# Patient Record
Sex: FEMALE | Race: BLACK OR AFRICAN AMERICAN
[De-identification: names, ages, dates, MRNs, and addresses within clinical notes are randomized per-mention and may not be internally consistent; named-entity substitution may affect disease eponyms.]

---

## 2020-03-10 ENCOUNTER — HOSPITAL ENCOUNTER (OUTPATIENT)
Dept: HOSPITAL 76 - LAB.WCP | Age: 80
Discharge: HOME | End: 2020-03-10
Attending: FAMILY MEDICINE
Payer: MEDICARE

## 2020-03-10 DIAGNOSIS — I25.10: ICD-10-CM

## 2020-03-10 DIAGNOSIS — R94.6: ICD-10-CM

## 2020-03-10 DIAGNOSIS — G89.4: ICD-10-CM

## 2020-03-10 DIAGNOSIS — H53.8: Primary | ICD-10-CM

## 2020-03-10 DIAGNOSIS — I73.9: ICD-10-CM

## 2020-03-10 LAB
ALBUMIN DIAFP-MCNC: 3.3 G/DL (ref 3.2–5.5)
ALBUMIN/GLOB SERPL: 0.6 {RATIO} (ref 1–2.2)
ALP SERPL-CCNC: 92 IU/L (ref 42–121)
ALT SERPL W P-5'-P-CCNC: 11 IU/L (ref 10–60)
ANION GAP SERPL CALCULATED.4IONS-SCNC: 5 MMOL/L (ref 6–13)
AST SERPL W P-5'-P-CCNC: 17 IU/L (ref 10–42)
BASOPHILS NFR BLD AUTO: 0 10^3/UL (ref 0–0.1)
BASOPHILS NFR BLD AUTO: 0.3 %
BILIRUB BLD-MCNC: 0.4 MG/DL (ref 0.2–1)
BUN SERPL-MCNC: 14 MG/DL (ref 6–20)
CALCIUM UR-MCNC: 9.2 MG/DL (ref 8.5–10.3)
CHLORIDE SERPL-SCNC: 109 MMOL/L (ref 101–111)
CHOLEST SERPL-MCNC: 138 MG/DL
CO2 SERPL-SCNC: 25 MMOL/L (ref 21–32)
CREAT SERPLBLD-SCNC: 0.8 MG/DL (ref 0.4–1)
EOSINOPHIL # BLD AUTO: 0 10^3/UL (ref 0–0.7)
EOSINOPHIL NFR BLD AUTO: 0.7 %
ERYTHROCYTE [DISTWIDTH] IN BLOOD BY AUTOMATED COUNT: 18.7 % (ref 12–15)
GFRSERPLBLD MDRD-ARVRAT: 83 ML/MIN/{1.73_M2} (ref 89–?)
GLOBULIN SER-MCNC: 5.2 G/DL (ref 2.1–4.2)
GLUCOSE SERPL-MCNC: 60 MG/DL (ref 70–100)
HDLC SERPL-MCNC: 32 MG/DL
HDLC SERPL: 4.3 {RATIO} (ref ?–4.4)
HGB UR QL STRIP: 11.9 G/DL (ref 12–16)
LDLC SERPL CALC-MCNC: 76 MG/DL
LDLC/HDLC SERPL: 2.4 {RATIO} (ref ?–4.4)
LYMPHOCYTES # SPEC AUTO: 3.2 10^3/UL (ref 1.5–3.5)
LYMPHOCYTES NFR BLD AUTO: 54.8 %
MCH RBC QN AUTO: 28.3 PG (ref 27–31)
MCHC RBC AUTO-ENTMCNC: 29.8 G/DL (ref 32–36)
MCV RBC AUTO: 94.8 FL (ref 81–99)
MONOCYTES # BLD AUTO: 0.6 10^3/UL (ref 0–1)
MONOCYTES NFR BLD AUTO: 10.1 %
NEUTROPHILS # BLD AUTO: 2 10^3/UL (ref 1.5–6.6)
NEUTROPHILS # SNV AUTO: 5.8 X10^3/UL (ref 4.8–10.8)
NEUTROPHILS NFR BLD AUTO: 33.9 %
PDW BLD AUTO: 12.6 FL (ref 7.9–10.8)
PLATELET # BLD: 475 10^3/UL (ref 130–450)
PROT SPEC-MCNC: 8.5 G/DL (ref 6.7–8.2)
RBC MAR: 4.21 10^6/UL (ref 4.2–5.4)
SODIUM SERPLBLD-SCNC: 139 MMOL/L (ref 135–145)
VLDLC SERPL-SCNC: 30 MG/DL

## 2020-03-10 PROCEDURE — 84443 ASSAY THYROID STIM HORMONE: CPT

## 2020-03-10 PROCEDURE — 83721 ASSAY OF BLOOD LIPOPROTEIN: CPT

## 2020-03-10 PROCEDURE — 36415 COLL VENOUS BLD VENIPUNCTURE: CPT

## 2020-03-10 PROCEDURE — 80061 LIPID PANEL: CPT

## 2020-03-10 PROCEDURE — 80053 COMPREHEN METABOLIC PANEL: CPT

## 2020-03-10 PROCEDURE — 85025 COMPLETE CBC W/AUTO DIFF WBC: CPT

## 2020-03-20 ENCOUNTER — HOSPITAL ENCOUNTER (OUTPATIENT)
Dept: HOSPITAL 76 - LAB.R | Age: 80
Discharge: HOME | End: 2020-03-20
Attending: FAMILY MEDICINE
Payer: MEDICARE

## 2020-03-20 DIAGNOSIS — N39.0: Primary | ICD-10-CM

## 2020-03-20 PROCEDURE — 87086 URINE CULTURE/COLONY COUNT: CPT

## 2020-03-20 PROCEDURE — 87181 SC STD AGAR DILUTION PER AGT: CPT

## 2020-03-20 PROCEDURE — 81002 URINALYSIS NONAUTO W/O SCOPE: CPT

## 2020-03-23 ENCOUNTER — HOSPITAL ENCOUNTER (OUTPATIENT)
Dept: HOSPITAL 76 - PC | Age: 80
Discharge: HOME | End: 2020-03-23
Attending: NURSE PRACTITIONER
Payer: MEDICARE

## 2020-03-23 DIAGNOSIS — F32.9: ICD-10-CM

## 2020-03-23 DIAGNOSIS — Z79.82: ICD-10-CM

## 2020-03-23 DIAGNOSIS — Z51.5: Primary | ICD-10-CM

## 2020-03-23 DIAGNOSIS — Z87.440: ICD-10-CM

## 2020-03-23 DIAGNOSIS — Z66: ICD-10-CM

## 2020-03-23 DIAGNOSIS — N39.0: ICD-10-CM

## 2020-03-23 DIAGNOSIS — F01.50: ICD-10-CM

## 2020-03-23 DIAGNOSIS — Z89.511: ICD-10-CM

## 2020-03-23 DIAGNOSIS — Z79.899: ICD-10-CM

## 2020-03-23 DIAGNOSIS — G89.4: ICD-10-CM

## 2020-03-23 DIAGNOSIS — G54.6: ICD-10-CM

## 2020-03-23 DIAGNOSIS — Z89.512: ICD-10-CM

## 2020-03-23 DIAGNOSIS — K59.00: ICD-10-CM

## 2020-03-23 DIAGNOSIS — Z87.891: ICD-10-CM

## 2020-03-23 DIAGNOSIS — I73.9: ICD-10-CM

## 2020-03-23 PROCEDURE — 99345 HOME/RES VST NEW HIGH MDM 75: CPT

## 2020-03-23 NOTE — CONSULTATION NOTE
Palliative Care Consultation





- Referral


Referring Provider: Dr. Gonzalez Ingram


Time of Visit: [id number]


Referral setting: Home


Referral Reason: Chronic Pain Syndrome/PVD/Vascular Dementia





- Information Sources


Records reviewed: Previous records reviewed


History/Review of Systems obtained from: Patient, Family (spouse/cargikaren/JULIANN Tellez)


Exam limitations: Clinical condition (short term memory impairment due to 

dementia)





- History of Present Illness


Brief History of Present Illness: 


This is an 80-year-old -American female who is seen in evaluation today 

in her home with her /D JIA Tellez presents at the bedside.  The patient 

has a significant peripheral vascular disease history and is status post 

bilateral below the knee amputations as well as vascular dementia and major 

depressive disorder. 





The patient was previously seen by this palliative care service while she was 

residing at [facility].  She was admitted to [facility] in 

2016 due to increased difficulty with function and overall cognitive 

decline.  It was her 's goal to have the patient return home and 

approximately 2 years ago he achieved that goal.  The patient's  reports 

that he did not feel that the patient received the care that she required within

the facility and she lost a lot of weight while there. He visited her on a daily

basis.  He reports that he reported his concerns to upper management.  The 

patient went down to 96 pounds and her usual weight for most of her adult life 

has ranged between 120 to 125 pounds.  The patient's last recorded weight on 

3/10/2020 was 121pounds. 





Presently, the patient has been doing well overall per her and her 's 

report since her return home.  She has regained her weight that she lost during 

her stay at [facility].  She is cared for by her  and 2 private paid 

caregivers.  Her appetite is stable and she consumes approximately 75% of her 

meals.





A request for palliative care services was initiated due to the difficulty of 

having the patient leave the home for medical appointments as well as concern 

for the 's vulnerability given that he is presently undergoing 

chemotherapy treatment.  Both he and his wife expressed concern regarding the 

current potential implications that the corona virus could have for the.





The patient has a longstanding history of phantom limb pain.  She was previously

on long-term opioid therapy but her primary care provider was able to taper her 

off of these medications much to the pleasure of both the patient and her 

spouse.  She had been on MS Contin, OxyContin, and fentanyl.  The patient's 

 reports that typically about once a month she will have "excruciating 

pain" in her bilateral stumps where the below the knee amputations were 

performed and her spouse will administer Tylenol and baclofen with resolution of

the pain.  She is also maintained presently on Lyrica 50 mg twice daily and this

appears to be effective.  If needed she also has lidocaine patches to apply to 

both stumps.





The patient is presently being treated for a urinary tract infection that was 

diagnosed on 3/20/2020 and is completing a course of Cipro.  She is feeling much

improved and no longer having dysuria.  Her  does have concerns as she 

appears to have been having more frequent urinary tract infections.  He had to 

obtain a kit from his primary care office in order to collect a specimen for 

evaluation and treatment.





Medical/Surgical History





- Past Medical History


Cardiovascular: reports: High cholesterol, Coronary artery disease, Peripheral 

Vascular Disease


Respiratory: reports: Asthma


Neuro: Dementia (Vascular Dementia)


Endocrine/Autoimmune: reports: None


GI: reports: GERD, Hiatal hernia


GYN: reports: Fibroids


: reports: None


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


Psych: reports: Depression, Anxiety


Musculoskeletal: reports: Osteoporosis


Derm: reports: None


MRSA Hx?: No


Other Past Medical History: Osteomyelitis right foot ; essential 

thrombocythemia TA 2 mutation 2015





- Past Surgical History


General: reports: Appendectomy, Splenectomy (2010)


Ortho: reports: Amputation (Left below knee amputation Dr. Turner ; right

below knee amputation by Dr. Turner .)


/GYN: reports: Hysterectomy


Cardiovascular: reports: Angioplasty (LLE angioplasty and stenting ; [place]on 

angioplasty right above knee popliteal artery 2009), Other (Right femral to 

dorsalis pedis artery bypass with spliced reversed vein graft and harvest of 

right cephalic and basilic veins 2003.)





- Substance History


Use: Uses substance without health or social issues: NONE (Former smoker until 

she was admitted to [facility] in 2016.)





Social History





- Living Situation


Living arrangement: At home


Living Situation: With spouse/s.o.


Support System: 


The patient grew up in [place], [place]. She was a [employer]. The 

patient's spouse went to culinary school and managed different operations within

the [employer].  Prior to relocating to [facility] they were in [place].  The 

patient and her spouse have been  for 59 years.  They have 2 children, 1 

daughter and 1 son.  They are estranged from their daughter who lives locally.  

Their son, Zac resides in [place].  The patient's healthcare power of 

 is her  followed by her son Zac.  The patient was a very 

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  She continues to offer suggestions

to her  regarding preparation of meals with flavor.  Her  also 

reports that she makes the best gumbo.





The patient has 2 private caregivers through rest care that comes 5 days a week.

 The caregivers are there 4 to 5 hours/day.  The patient's spouse is extremely 

appreciative of the experience caregiver who comes 3 days during the week.  

During the other times on weekends the patient sole caregiver is her .





Family History





- Family History


Family History: Mother: , Father: 


Family History Comment/Other: 





Obtained from medical records: Father  age 46 from alochol related 

complications and had a history of CAD. other  from old age. Daughter has a 

history of bipolar disorder. 





Medications/Allergies





- Medications


Home Medications: 


                                Ambulatory Orders











 Medication  Instructions  Recorded  Confirmed


 


ALPRAZolam [Alprazolam] 0.25 mg PO Q4HR PRN MDD every4-6h 16





 PRN  


 


Baclofen 10 mg PO TID PRN 10/12/19 03/23/20


 


Divalproex Sodium 125 mg PO BID 10/12/19 03/23/20


 


Esomeprazole Magnesium 40 mg PO QDAC 10/12/19 03/23/20


 


Lovastatin 80 mg PO QPM 10/12/19 03/23/20


 


Mirtazapine 7.5 mg PO QPM 10/12/19 03/23/20


 


Potassium Chloride 20 meq PO DAILY 10/12/19 03/23/20


 


ALPRAZolam [Alprazolam] 0.25 mg PO BID 10/13/19 03/23/20


 


Aspirin [Aspirin EC] 81 mg PO DAILY 10/13/19 03/23/20


 


Dorzolamide HCl/Timolol Maleat 1 drops EACHEYE BID 10/13/19 03/23/20





[Dorzolamide-Timolol Eye Drops]   


 


Latanoprost/Pf [Latanoprost 0.005% 1 drops EACHEYE QPM 10/13/19 03/23/20





Eye Drop]   


 


Pregabalin 50 mg PO BID 10/13/19 03/23/20


 


Brimonidine 0.2% Ophth Drops 1 drops RIGHTEYE BID 20





[Alphagan P 0.2% Ophth Drops]   


 


Cholecalciferol (Vitamin D3) 1,000 unit PO DAILY 20





[Vitamin D3]   


 


Famotidine [Pepcid] 20 mg PO DAILY 20


 


Ferrous Sulfate 325 mg PO DAILY 20


 


Lidocaine [Aspercreme Lidocaine] 2 patch TP DAILY PRN MDD to b/l BKA 20


 


Senna [Senokot] 8.6 mg PO QPM MDD Hold loose stools 20


 


polyethylene glycoL 3350 [Miralax] 17 gm PO DAILY PRN 20














- Allergies


Allergies/Adverse Reactions: 


                                    Allergies











Allergy/AdvReac Type Severity Reaction Status Date / Time


 


gentamicin [Gentamicin] Allergy  Unknown Verified 19 12:48


 


lisinopril Allergy  Unknown Verified 19 12:48


 


paroxetine [From Paxil] Allergy  Unknown Verified 19 07:33














Review of Systems





- Constitutional


Constitutional: reports: Fatigue, Weight stable (weight 121lb 3/10/2020).  

denies: Fever, Chills





- Eyes


Eyes: reports: Vision loss (left eye blindness)





- Ears, Nose & Throat


Ears, Nose & Throat: denies: Hearing loss, Dry mouth





- Cardiovascular


Cardiovascular: denies: Palpitations, Chest pain, Edema





- Respiratory


Respiratory: denies: Cough, Wheezing





- Gastrointestinal


Gastrointestinal: reports: Constipation (intermittent with recent reports of 

discomfort when defecating), Reflux/heartburn (controlled), Good appetite.  

denies: Abdominal pain, Diarrhea, Vomiting





- Genitourinary


Genitourinary: reports: Incontinence (occasional incontience).  denies: Dysuria,

Urgency, Hematuria





- Musculoskeletal


Musculoskeletal: reports: Muscle weakness, Assistive devices (rollator with 

bilateral lower leg prosthesis)





- Integumentary


Integumentary: denies: Rash





- Neurological


Neurological: reports: General weakness, Memory problems





- Psychiatric


Psychiatric: reports: Depression, Anxiety





- Endocrine


Endocrine: denies: Diabetes type 2, Hypothyroidism





- Hematologic/Lymphatic


Hematologic/Lymphatic: reports: Recurrent infections (UTI 2020 and 

2019), Other (Essential thrombocytosis with JAK2 mutation)





- All Other Systems


All Other Systems: reports: Reviewed and negative





Physical Exam





- Vital Signs


Temperature: 36.5 C


Pulse Rate: 88


O2 Saturation: 94 (on RA at rest)


Blood Pressure: 112/63 (left wrist cuff)





- Physical Exam


General Appearance: positive: No acute distress, Alert, Other (resting in bed, 

well groomed with nightgown on)


Eyes Bilateral: positive: Normal inspection


ENT: positive: No signs of dehydration


Neck: positive: No JVD, Trachea midline


Cardiovascular: positive: Regular rate & rhythm, No murmur


Respiratory: positive: No respiratory distress, Breath sounds nml.  negative: 

Wheezes, Rales


Abdomen: positive: Non-tender, Soft, Nml bowel sounds, Other (Bladder 

nondistended).  negative: Distended


Skin: positive: No symptoms, Other (Skin to sacrum intact)


Extremities: positive: Other (Bilateral below the knee amputations with skin to 

stumps intact without evidence of breakdown)


Neurologic/Psychiatric: positive: Mood/affect nml, Disoriented to time, Other 

(Able to recall life events but evidence of short term memory impairment. She 

was calm and relaxed during the entire visit and would brighten on certain 

memories. On certain specific questions such as consumption of meals or c/o 

dysuria she would differ the answer to her  stating "I don't know.")





Palliative Care





- POLST


Patient has POLST: Yes


POLST Status: DNR, Selective Treatment


Pain: Pain unchanged (located to bilateral stumps 2/2 below the knee amputation 

due to phantom pain syndrome that comes and goes. Is managed when acute pain 

occurs with baclofen and tylenol. Is on lyrica routinely.)


Drowsiness/Sedation: Mild (1-3)


Nausea: None


Anorexia: None


Dyspnea: None


Depression: Mild (1-3) (history of depression, controlled)


Anxiety: Mild (1-3) (history of anxiety, controlled with alprazolam)


Feelings of wellbeing/Perceived Quality of Life: Fair


Sleep: Sleeps well


Constipation: Managed, Intermittent constipation (Spouse will administered 

miralax or senna as needed)


Performance Status: 


Patient is essentially homebound.  She has only gone out twice out of her home 

in recent months.  She is dependent on her  and private caregivers for 

her care.Patient has a Rollator, wheelchair, transport wheelchair and bedside 

commode available for her use.  Her  assist her with getting her 

bilateral lower leg prosthetics on.  She requires assistance from transferring 

from the bed to the bedside commode.  Her caregivers provide hygiene care.  

Overall she is dependent on her ADLs.  No recent history of falls.





PPS 40%





- Palliative Care


Discussion: 


The patient herself has remained quite stable since her discharge from [facility] and return home with her spouse is her primary caregiver.  She was 

quite pleased to be able to leave [facility] and return home as that was

both her and her spouse's wish.  Her  considers her to be a strong 

fighter.  And despite her stability she does remain quite fragile.  Her  

gains his strength from the patient.  The patient herself when asked what her 

underlying fear is answered losing her  in light of his present diagnosis

of cancer and chemotherapy treatments.  When exploring this fear further, her 

 reports that if something were to happen to him the there is a plan in 

place that the couples son, Zac, who resides in [place] would assume care of

the patient.  When discussing this topic of loss both became subdued.  The 

spouse reports that he does not want the patient to suffer or "be a vegetable." 

Explored the POLST that was completed in  with no changes to the previously 

selected selections.  Patient to remain DN AR with limited interventions, 

antibiotic therapy with comfort as goal and no artifical nutrition by tube. 





Impression and Recommendations





- Palliative Care


Impression: 


This is an 80-year-old -American female who though is presently stable 

remains quite fragile with a past medical history significant for peripheral 

vascular disease status post bilateral below the knee amputations, vascular 

dementia, chronic pain and major depressive disorder.  She presently is 

experiencing some symptoms of constipation and her  is administering as 

needed laxatives.  Palliative care to continue to provide support with symptom 

management, anticipatory guidance, and exploration of goals of care.


Recommendations/Counseling Done: 


1. Chronic pain. Multifactorial in origin with a history of long-term opioid 

therapy That is no longer in use.  History of phantom pain due to bilateral 

below the knee amputation secondary to peripheral vascular disease.  Symptoms of

chronic pain and phantom pain are controlled with Lyrica as prescribed.  In an 

acute episode symptoms are controlled with Tylenol and as needed baclofen.





2. constipation.  Intermittent and multifactorial secondary to decreased 

mobility.  Recommended initiation of senna 8.6 mg to begin as started in the 

evening for stimulation and to hold if loose stools.  May continue to use 

MiraLAX 17 g daily as needed.





3.  Urinary tract infection.  No longer with complaints of dysuria and remains 

afebrile.  Complete Cipro antibiotic course as prescribed by PCP.  Due to 

history of recent urinary tract infections provided patient's spouse with 

sterile specimen cup and cleansing wipe with instructions to notify provider if 

develop signs and symptoms of urinary tract infections and may collect urine 

specimen within the home for evaluation at the lab.  Spouse verbalized 

understanding to contact ARNP prior to collection of sample.





4. major depressive disorder.  Presently controlled.  Continue Remeron 7.5 mg as

needed in the evening.  Weight remains stable.  If needed may consider up 

titration if patient displays breakthrough depressive symptoms.





5. Vascular dementia with a history of peripheral vascular disease.  Chronic.  

Progressive.  Fall precautions.  


Continue secondary preventative measures with statin and ASA therapy as 

prescribed.  Continue supportive care.  Continue Depakote as ordered due to 

history of agitation and may consider trial dose reduction in the future.





6. Advance care planning.  POLST from  reviewed with patient and spouse.  At

this time, spouse and patient declined any changes.  To continue DNA R status 

with limited interventions.  Spouse wishes for patient to remain comfortable.  

ARNP will continue to build rapport and explore goals of care moving forward. 


Time Spent: 





Total time spent 85 minutes with greater than 50% of this spent in counseling 

and coordination of care with patient and spouse/JULIANN Tellez; examination of 

patient; review of palliative philosophy;  review of pain and symptom management

and anticipatory guidance.





Disclaimer: The chart note was formulated using voice recognition technology and

unfortunately sound alike errors may occur.

## 2020-05-11 ENCOUNTER — HOSPITAL ENCOUNTER (OUTPATIENT)
Dept: HOSPITAL 76 - EMS | Age: 80
Discharge: ANOTHER HOSPITAL | End: 2020-05-11
Attending: SURGERY
Payer: MEDICARE

## 2020-05-11 ENCOUNTER — HOSPITAL ENCOUNTER (INPATIENT)
Dept: HOSPITAL 76 - ED | Age: 80
LOS: 3 days | Discharge: HOSPICE | DRG: 884 | End: 2020-05-14
Attending: NURSE PRACTITIONER | Admitting: INTERNAL MEDICINE
Payer: MEDICARE

## 2020-05-11 DIAGNOSIS — I73.9: ICD-10-CM

## 2020-05-11 DIAGNOSIS — R41.82: Primary | ICD-10-CM

## 2020-05-11 DIAGNOSIS — R45.1: ICD-10-CM

## 2020-05-11 DIAGNOSIS — F17.210: ICD-10-CM

## 2020-05-11 DIAGNOSIS — Z53.20: ICD-10-CM

## 2020-05-11 DIAGNOSIS — F32.9: ICD-10-CM

## 2020-05-11 DIAGNOSIS — Z79.82: ICD-10-CM

## 2020-05-11 DIAGNOSIS — F41.9: ICD-10-CM

## 2020-05-11 DIAGNOSIS — Z51.5: ICD-10-CM

## 2020-05-11 DIAGNOSIS — F01.50: ICD-10-CM

## 2020-05-11 DIAGNOSIS — Z87.09: ICD-10-CM

## 2020-05-11 DIAGNOSIS — I25.10: ICD-10-CM

## 2020-05-11 DIAGNOSIS — R63.0: ICD-10-CM

## 2020-05-11 DIAGNOSIS — Z89.512: ICD-10-CM

## 2020-05-11 DIAGNOSIS — Z89.511: ICD-10-CM

## 2020-05-11 DIAGNOSIS — E78.5: ICD-10-CM

## 2020-05-11 DIAGNOSIS — Z79.899: ICD-10-CM

## 2020-05-11 DIAGNOSIS — D50.9: ICD-10-CM

## 2020-05-11 DIAGNOSIS — K21.9: ICD-10-CM

## 2020-05-11 DIAGNOSIS — Z66: ICD-10-CM

## 2020-05-11 DIAGNOSIS — F01.51: ICD-10-CM

## 2020-05-11 DIAGNOSIS — H40.9: ICD-10-CM

## 2020-05-11 DIAGNOSIS — Z11.59: ICD-10-CM

## 2020-05-11 DIAGNOSIS — J45.909: ICD-10-CM

## 2020-05-11 DIAGNOSIS — M81.0: ICD-10-CM

## 2020-05-11 DIAGNOSIS — K44.9: ICD-10-CM

## 2020-05-11 LAB
ALBUMIN DIAFP-MCNC: 3.3 G/DL (ref 3.2–5.5)
ALBUMIN/GLOB SERPL: 0.6 {RATIO} (ref 1–2.2)
ALP SERPL-CCNC: 90 IU/L (ref 42–121)
ALT SERPL W P-5'-P-CCNC: < 10 IU/L (ref 10–60)
ANION GAP SERPL CALCULATED.4IONS-SCNC: 8 MMOL/L (ref 6–13)
APTT PPP: 32.8 SECS (ref 24.9–33.3)
AST SERPL W P-5'-P-CCNC: 16 IU/L (ref 10–42)
BASOPHILS # BLD MANUAL: 0 10^3/UL (ref 0–0.1)
BASOPHILS NFR BLD AUTO: 0.2 %
BILIRUB BLD-MCNC: 0.5 MG/DL (ref 0.2–1)
BUN SERPL-MCNC: 10 MG/DL (ref 6–20)
CALCIUM UR-MCNC: 9.3 MG/DL (ref 8.5–10.3)
CHLORIDE SERPL-SCNC: 109 MMOL/L (ref 101–111)
CK SERPL-CCNC: 25 IU/L (ref 22–269)
CLARITY UR REFRACT.AUTO: CLEAR
CO2 SERPL-SCNC: 22 MMOL/L (ref 21–32)
CREAT SERPLBLD-SCNC: 0.9 MG/DL (ref 0.4–1)
EOSINOPHIL # BLD MANUAL: 0 10^3/UL (ref 0–0.7)
EOSINOPHIL NFR BLD AUTO: 0.4 %
ERYTHROCYTE [DISTWIDTH] IN BLOOD BY AUTOMATED COUNT: 18.2 % (ref 12–15)
GLOBULIN SER-MCNC: 5.1 G/DL (ref 2.1–4.2)
GLUCOSE SERPL-MCNC: 83 MG/DL (ref 70–100)
GLUCOSE UR QL STRIP.AUTO: NEGATIVE MG/DL
HGB UR QL STRIP: 11.3 G/DL (ref 12–16)
INR PPP: 1.2 (ref 0.8–1.2)
KETONES UR QL STRIP.AUTO: NEGATIVE MG/DL
LIPASE SERPL-CCNC: 31 U/L (ref 22–51)
LYMPH ABN NFR BLD MANUAL: 0 %
LYMPHOBLASTS # BLD: 40 %
LYMPHOCYTES # BLD MANUAL: 3.3 10^3/UL (ref 1.5–3.5)
LYMPHOCYTES NFR BLD AUTO: 43.1 %
MANUAL DIF COMMENT BLD-IMP: (no result)
MCH RBC QN AUTO: 27.8 PG (ref 27–31)
MCHC RBC AUTO-ENTMCNC: 30.1 G/DL (ref 32–36)
MCV RBC AUTO: 92.4 FL (ref 81–99)
MONOCYTES # BLD MANUAL: 0.7 10^3/UL (ref 0–1)
MONOCYTES NFR BLD AUTO: 9.7 %
NEUTROPHILS # SNV AUTO: 8.2 X10^3/UL (ref 4.8–10.8)
NEUTROPHILS NFR BLD AUTO: 46.2 %
NEUTS BAND NFR BLD: 0 %
NITRITE UR QL STRIP.AUTO: NEGATIVE
PDW BLD AUTO: 11.7 FL (ref 7.9–10.8)
PH UR STRIP.AUTO: 7 PH (ref 5–7.5)
PLAT MORPH BLD: (no result)
PLATELET # BLD: 556 10^3/UL (ref 130–450)
PLATELET BLD QL SMEAR: (no result)
PROT SPEC-MCNC: 8.4 G/DL (ref 6.7–8.2)
PROT UR STRIP.AUTO-MCNC: NEGATIVE MG/DL
PROTHROM ACT/NOR PPP: 13.2 SECS (ref 9.9–12.6)
RBC # UR STRIP.AUTO: NEGATIVE /UL
RBC MAR: 4.06 10^6/UL (ref 4.2–5.4)
RBC MORPH BLD: (no result)
SODIUM SERPLBLD-SCNC: 139 MMOL/L (ref 135–145)
SP GR UR STRIP.AUTO: 1.01 (ref 1–1.03)
UROBILINOGEN UR QL STRIP.AUTO: (no result) E.U./DL
UROBILINOGEN UR STRIP.AUTO-MCNC: NEGATIVE MG/DL
VOLATILE DRUGS POS SERPL SCN: (no result)

## 2020-05-11 PROCEDURE — 82140 ASSAY OF AMMONIA: CPT

## 2020-05-11 PROCEDURE — 99233 SBSQ HOSP IP/OBS HIGH 50: CPT

## 2020-05-11 PROCEDURE — 84484 ASSAY OF TROPONIN QUANT: CPT

## 2020-05-11 PROCEDURE — 85730 THROMBOPLASTIN TIME PARTIAL: CPT

## 2020-05-11 PROCEDURE — 80164 ASSAY DIPROPYLACETIC ACD TOT: CPT

## 2020-05-11 PROCEDURE — 96372 THER/PROPH/DIAG INJ SC/IM: CPT

## 2020-05-11 PROCEDURE — 80320 DRUG SCREEN QUANTALCOHOLS: CPT

## 2020-05-11 PROCEDURE — 87086 URINE CULTURE/COLONY COUNT: CPT

## 2020-05-11 PROCEDURE — 83690 ASSAY OF LIPASE: CPT

## 2020-05-11 PROCEDURE — 85025 COMPLETE CBC W/AUTO DIFF WBC: CPT

## 2020-05-11 PROCEDURE — 96367 TX/PROPH/DG ADDL SEQ IV INF: CPT

## 2020-05-11 PROCEDURE — 80053 COMPREHEN METABOLIC PANEL: CPT

## 2020-05-11 PROCEDURE — 96365 THER/PROPH/DIAG IV INF INIT: CPT

## 2020-05-11 PROCEDURE — 84100 ASSAY OF PHOSPHORUS: CPT

## 2020-05-11 PROCEDURE — 87275 INFLUENZA B AG IF: CPT

## 2020-05-11 PROCEDURE — 82550 ASSAY OF CK (CPK): CPT

## 2020-05-11 PROCEDURE — 83605 ASSAY OF LACTIC ACID: CPT

## 2020-05-11 PROCEDURE — 83880 ASSAY OF NATRIURETIC PEPTIDE: CPT

## 2020-05-11 PROCEDURE — 36415 COLL VENOUS BLD VENIPUNCTURE: CPT

## 2020-05-11 PROCEDURE — 70450 CT HEAD/BRAIN W/O DYE: CPT

## 2020-05-11 PROCEDURE — 80048 BASIC METABOLIC PNL TOTAL CA: CPT

## 2020-05-11 PROCEDURE — 80306 DRUG TEST PRSMV INSTRMNT: CPT

## 2020-05-11 PROCEDURE — 96375 TX/PRO/DX INJ NEW DRUG ADDON: CPT

## 2020-05-11 PROCEDURE — 71045 X-RAY EXAM CHEST 1 VIEW: CPT

## 2020-05-11 PROCEDURE — 83735 ASSAY OF MAGNESIUM: CPT

## 2020-05-11 PROCEDURE — 82607 VITAMIN B-12: CPT

## 2020-05-11 PROCEDURE — 81003 URINALYSIS AUTO W/O SCOPE: CPT

## 2020-05-11 PROCEDURE — 87040 BLOOD CULTURE FOR BACTERIA: CPT

## 2020-05-11 PROCEDURE — 84443 ASSAY THYROID STIM HORMONE: CPT

## 2020-05-11 PROCEDURE — 87276 INFLUENZA A AG IF: CPT

## 2020-05-11 PROCEDURE — 96361 HYDRATE IV INFUSION ADD-ON: CPT

## 2020-05-11 PROCEDURE — 81599 UNLISTED MAAA: CPT

## 2020-05-11 PROCEDURE — 96366 THER/PROPH/DIAG IV INF ADDON: CPT

## 2020-05-11 PROCEDURE — 93005 ELECTROCARDIOGRAM TRACING: CPT

## 2020-05-11 PROCEDURE — 99285 EMERGENCY DEPT VISIT HI MDM: CPT

## 2020-05-11 PROCEDURE — 81001 URINALYSIS AUTO W/SCOPE: CPT

## 2020-05-11 PROCEDURE — 85610 PROTHROMBIN TIME: CPT

## 2020-05-11 NOTE — CT REPORT
Reason:  ams

Procedure Date:  05/11/2020   

Accession Number:  [id number] / [id number]                    

Procedure:  CT  - HEAD WO CPT Code:  

 

***Final Report***

 

 

FULL RESULT:

 

 

EXAM:

CT HEAD

 

EXAM DATE: 5/11/2020 10:46 PM.

 

CLINICAL HISTORY: Decreased mental status.

 

COMPARISON: BRAIN 09/01/2015 7:04 PM.

 

TECHNIQUE: Multiaxial CT images were obtained from the foramen magnum to 

the vertex. Reformats: Sagittal and coronal. IV contrast: None.

 

In accordance with CT protocol optimization, one or more of the following 

dose reduction techniques were utilized for this exam: automated exposure 

control, adjustment of mA and/or KV based on patient size, or use of 

iterative reconstructive technique.

 

FINDINGS:

Parenchyma: No intraparenchymal hemorrhage. No evidence of mass, midline 

shift, or CT findings of acute infarction. Gray-white differentiation is 

distinct. Diffuse chronic microangiopathic white matter changes are 

evident.

 

Extraaxial Spaces: Normal for age. No subdural or epidural collections 

identified.

 

Ventricles: The ventricles and cortical sulci are enlarged, consistent 

with age-related tissue loss.

 

Sinuses and orbits: Imaged paranasal sinuses, orbits, and mastoids show 

no significant abnormality.

 

Bones: No evidence of fracture or calvarial defect.

 

Other: None.

IMPRESSION: Generalized age-related cortical atrophic changes without 

evidence of acute intracranial abnormality.

 

RADIA

## 2020-05-11 NOTE — ED PHYSICIAN DOCUMENTATION
History of Present Illness





- Stated complaint


Stated Complaint: LETHARGIC, LOC





- History obtained from


History obtained from: EMS (EMS reports that this 80-year-old female who 

presents via ambulance was brought in from home after has been reports she is 

lethargic he reports that she is typically awake, alert and oriented but today 

all she is doing as answering yes or now said the symptoms started about 1 day 

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 

vasculopath and she is a bilateral below the knee amputee patient.The remainder 

the history is unknown as the patient is minimally responsive.)





Review of Systems


Unable to obtain: AMS





PD PAST MEDICAL HISTORY





- Past Medical History


Cardiovascular: High cholesterol, Coronary artery disease, Peripheral Vascular 

Disease


Respiratory: Asthma


Neuro: Dementia (Vascular Dementia)


Endocrine/Autoimmune: None


GI: GERD, Hiatal hernia


GYN: Fibroids


: None


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


Psych: Depression, Anxiety


Musculoskeletal: Osteoporosis


Derm: None





- Past Surgical History


Past Surgical History: Yes


General: Appendectomy, Splenectomy (4/2010)


Ortho: Amputation (Left below knee amputation Dr. Turner 2001; right below 

knee amputation by Dr. Turner 2010.)


/GYN: Hysterectomy


Cardiovascular: Angioplasty (LLE angioplasty and stenting 1998; [place] 

angioplasty right above knee popliteal artery 2/2009), Other (Right femral to 

dorsalis pedis artery bypass with spliced reversed vein graft and harvest of 

right cephalic and basilic veins 04/2003.)





- Present Medications


Home Medications: 


                                Ambulatory Orders











 Medication  Instructions  Recorded  Confirmed


 


ALPRAZolam [Alprazolam] 0.25 mg PO Q4HR PRN MDD every4-6h 05/17/16 03/23/20





 PRN  


 


Baclofen 10 mg PO TID PRN 10/12/19 03/23/20


 


Divalproex Sodium 125 mg PO BID 10/12/19 03/23/20


 


Esomeprazole Magnesium 40 mg PO QDAC 10/12/19 03/23/20


 


Lovastatin 80 mg PO QPM 10/12/19 03/23/20


 


Mirtazapine 7.5 mg PO QPM 10/12/19 03/23/20


 


Potassium Chloride 20 meq PO DAILY 10/12/19 03/23/20


 


ALPRAZolam [Alprazolam] 0.25 mg PO BID 10/13/19 03/23/20


 


Aspirin [Aspirin EC] 81 mg PO DAILY 10/13/19 03/23/20


 


Dorzolamide HCl/Timolol Maleat 1 drops EACHEYE BID 10/13/19 03/23/20





[Dorzolamide-Timolol Eye Drops]   


 


Latanoprost/Pf [Latanoprost 0.005% 1 drops EACHEYE QPM 10/13/19 03/23/20





Eye Drop]   


 


Pregabalin 50 mg PO BID 10/13/19 03/23/20


 


Brimonidine 0.2% Ophth Drops 1 drops RIGHTEYE BID 03/23/20 03/23/20





[Alphagan P 0.2% Ophth Drops]   


 


Cholecalciferol (Vitamin D3) 1,000 unit PO DAILY 03/23/20 03/23/20





[Vitamin D3]   


 


Famotidine [Pepcid] 20 mg PO DAILY 03/23/20 03/23/20


 


Ferrous Sulfate 325 mg PO DAILY 03/23/20 03/23/20


 


Lidocaine [Aspercreme Lidocaine] 2 patch TP DAILY PRN MDD to b/l BKA 03/23/20 03/23/20


 


Senna [Senokot] 8.6 mg PO QPM MDD Hold loose stools 03/23/20 03/23/20


 


polyethylene glycoL 3350 [Miralax] 17 gm PO DAILY PRN 03/23/20 03/23/20














- Allergies


Allergies/Adverse Reactions: 


                                    Allergies











Allergy/AdvReac Type Severity Reaction Status Date / Time


 


gentamicin [Gentamicin] Allergy  Unknown Verified 12/13/19 12:48


 


lisinopril Allergy  Unknown Verified 12/13/19 12:48


 


paroxetine [From Paxil] Allergy  Unknown Verified 12/17/19 07:33














- Social History


Does the pt smoke?: No


Smoking Status: Never smoker


Does the pt drink ETOH?: No


Does the pt have substance abuse?: No





- Immunizations


Immunizations are current?: Yes





- POLST


Patient has POLST: Yes





PD ED PE NORMAL





- Vitals


Vital signs reviewed: Yes





- General


General: No acute distress, Other (Lethargic, obvious bilateral below the knee 

amputations)





- HEENT


HEENT: PERRL, Moist mucous membranes





- Neck


Neck: Supple, no meningeal sign, No adenopathy





- Cardiac


Cardiac: RRR, No murmur, Strong equal pulses





- Respiratory


Respiratory: No respiratory distress, Clear bilaterally





- Abdomen


Abdomen: Normal bowel sounds, Soft, Non tender, Non distended





- Derm


Derm: Warm and dry





- Extremities


Extremities: Other (bilateral below the knee amputations. stumps with no signs 

of infection. )





- Neuro


Neuro: Other (patient will answer yes and no and moan otherwise she does not 

follow any commands. she deos respond to pain but does not follow commands. )





Results





- Vitals


Vitals: 


                               Vital Signs - 24 hr











  05/11/20 05/11/20 05/12/20





  22:10 23:48 00:00


 


Temperature 37.7 C H 37.1 C 


 


Heart Rate 74 82 84


 


Respiratory 16 16 14





Rate   


 


Blood Pressure 154/91 H 164/82 H 122/74


 


O2 Saturation 93 95 93








                                     Oxygen











O2 Source [Without Activity]   Room air


 


O2 Source                      Room air

















- EKG (time done)


  ** 22:16


Rate: Other (No STEMI)





- Labs


Labs: 


                                Laboratory Tests











  05/11/20 05/11/20 05/11/20





  22:00 22:00 22:26


 


WBC    8.2


 


RBC    4.06 L


 


Hgb    11.3 L


 


Hct    37.5


 


MCV    92.4


 


MCH    27.8


 


MCHC    30.1 L


 


RDW    18.2 H


 


Plt Count    556 H


 


MPV    11.7 H


 


Neut # (Auto)    Not Reportable


 


Lymph # (Auto)    Not Reportable


 


Mono # (Auto)    Not Reportable


 


Eos # (Auto)    Not Reportable


 


Baso # (Auto)    Not Reportable


 


Absolute Nucleated RBC    Not Reportable


 


Total Counted    100


 


Band Neuts % (Manual)    0


 


Abnorm Lymph % (Manual)    0


 


Nucleated RBC %    Not Reportable


 


Neutrophils # (Manual)    4.3


 


Lymphocytes # (Manual)    3.3


 


Monocytes # (Manual)    0.7


 


Eosinophils # (Manual)    0.0


 


Basophils # (Manual)    0.0


 


Differential Comment    MANUAL DIFFERENTIAL


 


WBC Morphology    NORMAL APPEARANCE


 


Platelet Estimate    INCREASED (>450,000)


 


Platelet Morphology    2+ LARGE PLATELETS


 


RBC Morph Micro Appear    1+ POLYCHROMASIA


 


PT   


 


INR   


 


APTT   


 


Sodium   


 


Potassium   


 


Chloride   


 


Carbon Dioxide   


 


Anion Gap   


 


BUN   


 


Creatinine   


 


Estimated GFR (MDRD)   


 


Glucose   


 


Lactic Acid   


 


Calcium   


 


Total Bilirubin   


 


AST   


 


ALT   


 


Alkaline Phosphatase   


 


Total Creatine Kinase   


 


Troponin I High Sens   


 


B-Natriuretic Peptide   


 


Total Protein   


 


Albumin   


 


Globulin   


 


Albumin/Globulin Ratio   


 


Lipase   


 


Urine Color  YELLOW  


 


Urine Clarity  CLEAR  


 


Urine pH  7.0  


 


Ur Specific Gravity  1.015  


 


Urine Protein  NEGATIVE  


 


Urine Glucose (UA)  NEGATIVE  


 


Urine Ketones  NEGATIVE  


 


Urine Occult Blood  NEGATIVE  


 


Urine Nitrite  NEGATIVE  


 


Urine Bilirubin  NEGATIVE  


 


Urine Urobilinogen  0.2 (NORMAL)  


 


Ur Leukocyte Esterase  NEGATIVE  


 


Ur Microscopic Review  NOT INDICATED  


 


Urine Culture Comments  NOT INDICATED  


 


Urine Opiates Screen   NEGATIVE 


 


Ur Oxycodone Screen   NEGATIVE 


 


Urine Methadone Screen   NEGATIVE 


 


Ur Propoxyphene Screen   NEGATIVE 


 


Ur Barbiturates Screen   NEGATIVE 


 


Ur Tricyclics Screen   NEGATIVE 


 


Ur Phencyclidine Scrn   NEGATIVE 


 


Ur Amphetamine Screen   NEGATIVE 


 


U Methamphetamines Scrn   NEGATIVE 


 


U Benzodiazepines Scrn   POSITIVE H 


 


Urine Cocaine Screen   NEGATIVE 


 


U Cannabinoids Screen   NEGATIVE 


 


Ethyl Alcohol   


 


Influenza A (Rapid)   


 


Influenza B (Rapid)   














  05/11/20 05/11/20 05/11/20





  22:26 22:26 22:26


 


WBC   


 


RBC   


 


Hgb   


 


Hct   


 


MCV   


 


MCH   


 


MCHC   


 


RDW   


 


Plt Count   


 


MPV   


 


Neut # (Auto)   


 


Lymph # (Auto)   


 


Mono # (Auto)   


 


Eos # (Auto)   


 


Baso # (Auto)   


 


Absolute Nucleated RBC   


 


Total Counted   


 


Band Neuts % (Manual)   


 


Abnorm Lymph % (Manual)   


 


Nucleated RBC %   


 


Neutrophils # (Manual)   


 


Lymphocytes # (Manual)   


 


Monocytes # (Manual)   


 


Eosinophils # (Manual)   


 


Basophils # (Manual)   


 


Differential Comment   


 


WBC Morphology   


 


Platelet Estimate   


 


Platelet Morphology   


 


RBC Morph Micro Appear   


 


PT  13.2 H  


 


INR  1.2  


 


APTT  32.8  


 


Sodium   139 


 


Potassium   4.2 


 


Chloride   109 


 


Carbon Dioxide   22 


 


Anion Gap   8.0 


 


BUN   10 


 


Creatinine   0.9 


 


Estimated GFR (MDRD)   73 L 


 


Glucose   83 


 


Lactic Acid   


 


Calcium   9.3 


 


Total Bilirubin   0.5 


 


AST   16 


 


ALT   < 10 L 


 


Alkaline Phosphatase   90 


 


Total Creatine Kinase   25 


 


Troponin I High Sens   


 


B-Natriuretic Peptide    48


 


Total Protein   8.4 H 


 


Albumin   3.3 


 


Globulin   5.1 H 


 


Albumin/Globulin Ratio   0.6 L 


 


Lipase   31 


 


Urine Color   


 


Urine Clarity   


 


Urine pH   


 


Ur Specific Gravity   


 


Urine Protein   


 


Urine Glucose (UA)   


 


Urine Ketones   


 


Urine Occult Blood   


 


Urine Nitrite   


 


Urine Bilirubin   


 


Urine Urobilinogen   


 


Ur Leukocyte Esterase   


 


Ur Microscopic Review   


 


Urine Culture Comments   


 


Urine Opiates Screen   


 


Ur Oxycodone Screen   


 


Urine Methadone Screen   


 


Ur Propoxyphene Screen   


 


Ur Barbiturates Screen   


 


Ur Tricyclics Screen   


 


Ur Phencyclidine Scrn   


 


Ur Amphetamine Screen   


 


U Methamphetamines Scrn   


 


U Benzodiazepines Scrn   


 


Urine Cocaine Screen   


 


U Cannabinoids Screen   


 


Ethyl Alcohol   < 5.0 


 


Influenza A (Rapid)   


 


Influenza B (Rapid)   














  05/11/20 05/11/20 05/11/20





  22:26 22:26 23:09


 


WBC   


 


RBC   


 


Hgb   


 


Hct   


 


MCV   


 


MCH   


 


MCHC   


 


RDW   


 


Plt Count   


 


MPV   


 


Neut # (Auto)   


 


Lymph # (Auto)   


 


Mono # (Auto)   


 


Eos # (Auto)   


 


Baso # (Auto)   


 


Absolute Nucleated RBC   


 


Total Counted   


 


Band Neuts % (Manual)   


 


Abnorm Lymph % (Manual)   


 


Nucleated RBC %   


 


Neutrophils # (Manual)   


 


Lymphocytes # (Manual)   


 


Monocytes # (Manual)   


 


Eosinophils # (Manual)   


 


Basophils # (Manual)   


 


Differential Comment   


 


WBC Morphology   


 


Platelet Estimate   


 


Platelet Morphology   


 


RBC Morph Micro Appear   


 


PT   


 


INR   


 


APTT   


 


Sodium   


 


Potassium   


 


Chloride   


 


Carbon Dioxide   


 


Anion Gap   


 


BUN   


 


Creatinine   


 


Estimated GFR (MDRD)   


 


Glucose   


 


Lactic Acid  1.1  


 


Calcium   


 


Total Bilirubin   


 


AST   


 


ALT   


 


Alkaline Phosphatase   


 


Total Creatine Kinase   


 


Troponin I High Sens   4.1 


 


B-Natriuretic Peptide   


 


Total Protein   


 


Albumin   


 


Globulin   


 


Albumin/Globulin Ratio   


 


Lipase   


 


Urine Color   


 


Urine Clarity   


 


Urine pH   


 


Ur Specific Gravity   


 


Urine Protein   


 


Urine Glucose (UA)   


 


Urine Ketones   


 


Urine Occult Blood   


 


Urine Nitrite   


 


Urine Bilirubin   


 


Urine Urobilinogen   


 


Ur Leukocyte Esterase   


 


Ur Microscopic Review   


 


Urine Culture Comments   


 


Urine Opiates Screen   


 


Ur Oxycodone Screen   


 


Urine Methadone Screen   


 


Ur Propoxyphene Screen   


 


Ur Barbiturates Screen   


 


Ur Tricyclics Screen   


 


Ur Phencyclidine Scrn   


 


Ur Amphetamine Screen   


 


U Methamphetamines Scrn   


 


U Benzodiazepines Scrn   


 


Urine Cocaine Screen   


 


U Cannabinoids Screen   


 


Ethyl Alcohol   


 


Influenza A (Rapid)    Negative


 


Influenza B (Rapid)    Negative














PD MEDICAL DECISION MAKING





- ED course


Complexity details: reviewed old records, reviewed results, re-evaluated 

patient, considered differential (Patient presented initially with a blood sugar

of 70 she was given an amp of D50 her symptoms have not improved she also was 

given 1 mg of Narcan her symptoms have not improved she continues to answer yes 

or no and moans in response to pain but does not follow any commands there is no

obvious unilateral weakness or facial droop her symptoms have been going on for 

approximately 36 hours according to EMS.  The patient is unable to provide her 

history.  CT scan of the head is unremarkable EKG is unremarkable urinalysis 

shows no signs of infection there is no leukocytosis urine drug screens negative

alcohol is negative CT of the head is negative chest x-ray shows no obvious 

infiltrate.  I am unsure of the etiology of this patient's sudden decline in 

mental status at this point the patient will be admitted for observation and fur

ther monitoring.Given the fact that the patient had recently been diagnosed with

a urinary tract infection I initially thought that she would have a urinary 

tract infection she was empirically given 1 g of IV Rocephin.  She also was 

empirically given Narcan as well as glucose.However given the patient continues 

to have worsening mental status and I am Unsure of the etiology of this patient 

will benefit from being hospitalized and monitoring for further evaluation COVID

testing was ordered as well and is pending.), d/w consultant





- Consults


Consults: Discussed case with (00:16 dr. hair. will admit for observation.)





Departure





- Departure


Disposition: ED Place in Observation


Clinical Impression: 


Altered mental status


Qualifiers:


 Altered mental status type: unspecified Qualified Code(s): R41.82 - Altered 

mental status, unspecified





Condition: Stable

## 2020-05-11 NOTE — XRAY REPORT
Reason:  ams

Procedure Date:  05/11/2020   

Accession Number:  [id number] / [id number]                    

Procedure:  XR  - Chest 1 View X-Ray CPT Code:  21679

 

***Final Report***

 

 

FULL RESULT:

 

 

EXAM:

CHEST RADIOGRAPHY

 

EXAM DATE: 5/11/2020 10:45 PM.

 

CLINICAL HISTORY: Decreased mental status.

 

COMPARISON: CHEST 1 VIEW 10/13/2019 10:06 AM.

 

TECHNIQUE: 1 view.

 

FINDINGS:

Lungs/Pleura: No alveolar consolidation or pleural effusion seen. No 

pneumothorax.

 

Mediastinum: Within exam limitations, heart size appears normal. Ectatic 

aorta, unchanged.

 

Other: Surgical clips in the left upper quadrant.

IMPRESSION:

1. No acute abnormality seen in the chest.

 

RADIA

## 2020-05-12 LAB
AMPHET UR QL SCN: NEGATIVE
ANION GAP SERPL CALCULATED.4IONS-SCNC: 10 MMOL/L (ref 6–13)
BASOPHILS NFR BLD AUTO: 0 10^3/UL (ref 0–0.1)
BASOPHILS NFR BLD AUTO: 0.3 %
BENZODIAZ UR QL SCN: POSITIVE
BUN SERPL-MCNC: 10 MG/DL (ref 6–20)
CALCIUM UR-MCNC: 9.1 MG/DL (ref 8.5–10.3)
CHLORIDE SERPL-SCNC: 108 MMOL/L (ref 101–111)
CO2 SERPL-SCNC: 23 MMOL/L (ref 21–32)
COCAINE UR-SCNC: NEGATIVE UMOL/L
CREAT SERPLBLD-SCNC: 1 MG/DL (ref 0.4–1)
EOSINOPHIL # BLD AUTO: 0 10^3/UL (ref 0–0.7)
EOSINOPHIL NFR BLD AUTO: 0.4 %
ERYTHROCYTE [DISTWIDTH] IN BLOOD BY AUTOMATED COUNT: 18.5 % (ref 12–15)
GLUCOSE SERPL-MCNC: 83 MG/DL (ref 70–100)
HGB UR QL STRIP: 11.5 G/DL (ref 12–16)
LYMPHOCYTES # SPEC AUTO: 2.7 10^3/UL (ref 1.5–3.5)
LYMPHOCYTES NFR BLD AUTO: 36.3 %
MCH RBC QN AUTO: 28.5 PG (ref 27–31)
MCHC RBC AUTO-ENTMCNC: 29.9 G/DL (ref 32–36)
MCV RBC AUTO: 95.3 FL (ref 81–99)
METHADONE UR QL SCN: NEGATIVE
METHAMPHET UR QL SCN: NEGATIVE
MONOCYTES # BLD AUTO: 0.8 10^3/UL (ref 0–1)
MONOCYTES NFR BLD AUTO: 10.9 %
NEUTROPHILS # BLD AUTO: 3.8 10^3/UL (ref 1.5–6.6)
NEUTROPHILS # SNV AUTO: 7.4 X10^3/UL (ref 4.8–10.8)
NEUTROPHILS NFR BLD AUTO: 51.4 %
OPIATES UR QL SCN: NEGATIVE
PDW BLD AUTO: 11.8 FL (ref 7.9–10.8)
PLAT MORPH BLD: (no result)
PLATELET # BLD: 499 10^3/UL (ref 130–450)
RBC MAR: 4.04 10^6/UL (ref 4.2–5.4)
SODIUM SERPLBLD-SCNC: 141 MMOL/L (ref 135–145)

## 2020-05-12 RX ADMIN — HEPARIN SODIUM SCH: 5000 INJECTION, SOLUTION INTRAVENOUS; SUBCUTANEOUS at 20:38

## 2020-05-12 RX ADMIN — DIVALPROEX SODIUM SCH: 125 TABLET, DELAYED RELEASE ORAL at 20:38

## 2020-05-12 RX ADMIN — SODIUM CHLORIDE, PRESERVATIVE FREE SCH ML: 5 INJECTION INTRAVENOUS at 02:06

## 2020-05-12 RX ADMIN — SODIUM CHLORIDE, PRESERVATIVE FREE SCH: 5 INJECTION INTRAVENOUS at 10:44

## 2020-05-12 RX ADMIN — DORZOLAMIDE HYDROCHLORIDE AND TIMOLOL MALEATE SCH: 20; 5 SOLUTION OPHTHALMIC at 20:38

## 2020-05-12 RX ADMIN — SODIUM CHLORIDE SCH MLS/HR: 9 INJECTION, SOLUTION INTRAVENOUS at 11:36

## 2020-05-12 RX ADMIN — HEPARIN SODIUM SCH UNIT: 5000 INJECTION, SOLUTION INTRAVENOUS; SUBCUTANEOUS at 09:49

## 2020-05-12 RX ADMIN — SODIUM CHLORIDE, PRESERVATIVE FREE SCH: 5 INJECTION INTRAVENOUS at 17:29

## 2020-05-12 RX ADMIN — SODIUM CHLORIDE SCH MLS/HR: 9 INJECTION, SOLUTION INTRAVENOUS at 02:06

## 2020-05-12 RX ADMIN — POTASSIUM CHLORIDE, DEXTROSE MONOHYDRATE AND SODIUM CHLORIDE SCH MLS/HR: 150; 5; 450 INJECTION, SOLUTION INTRAVENOUS at 17:29

## 2020-05-12 NOTE — HISTORY & PHYSICAL EXAMINATION
Chief Complaint





- Chief Complaint


Chief Complaint: Altered mental status





History of Present Illness





- Admitted From


Admitted From:: [facility]





- History Obtained From


Records Reviewed: yes


History obtained from: 


Exam Limitations: altered mental status





- History of Present Illness


HPI Comment/Other: 


Patient is an 80-year-old female who has had bilateral amputee due to peripheral

vascular disease.  Her other medical history to include hyperlipidemia, asthma, 

mild dementia, GERD, anxiety, glaucoma, iron deficiency anemia.She presented to 

the ED via EMS with altered mental status.  


As a result of her presentation this history is limited.


The history was provided by her  over the phone who explains that around 

midday the patient appeared to be drifting away and was incoherent.  She went to

the bathroom but then seemed to have forgotten what to do once she was done 

using the restroom.  Prior to this she was functioning at her baseline.  

Normally she is able to feed herself.  Yesterday she was conversing on the phone

with the children in [place] and in [place] who had called to wish her 

happy Mother's Day.  She has caregivers who come to the house on  through

 for 4 hours daily from 8 AM to noon.  They help her with showering.  

This was done today morning.  After that the patient had toast and coffee.


Work-up in the ED has been unremarkable.  This included a CT of the brain 

without contrast, CBC, BMP and UA. The patient's toxicology wasPositive for 

benzodiazepine.  The patient is on alprazolam, baclofen,Mirtazapine, Lyrica and 

Divalproex.She only takes the baclofen a few times a month.  She took it 

yesterday.


Currently the patient only cries out to a painful stimuli.  She does not follow 

any commands.  However her vitals are stable.


As a result of her presentation she is being admitted for observation overnight.











History





- Past Medical History


Cardiovascular: reports: High cholesterol, Coronary artery disease, Peripheral 

Vascular Disease


Respiratory: reports: Asthma


Neuro: reports: Dementia (Vascular Dementia)


Endocrine/Autoimmune: reports: None


GI: reports: GERD, Hiatal hernia


GYN: reports: Fibroids


: reports: None


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


Psych: reports: Depression, Anxiety


Musculoskeletal: reports: Osteoporosis


Derm: reports: None


MRSA Hx?: No





- Past Surgical History


General: reports: Appendectomy, Splenectomy (2010)


Ortho: reports: Amputation (Left below knee amputation Dr. Turner ; right

below knee amputation by Dr. Turner .)


/GYN: reports: Hysterectomy


Cardiovascular: reports: Angioplasty (LLE angioplasty and stenting ; [place] 

angioplasty right above knee popliteal artery 2009), Other (Right femral to 

dorsalis pedis artery bypass with spliced reversed vein graft and harvest of 

right cephalic and basilic veins 2003.)





- Family & Social History


Family History: Mother: , Father: , CAD


Social History Notes: She lives with her  in [place]. They have been 

 for 58 years. She had a 1ppd X 15 yrs now down to 1 or 2 cigarettes 

daily. The patient denies any alcohol or illicit drug use





- Substance History


Use: Uses substance without health or social issues: NONE (Former smoker until 

she was admitted to [facility] in .)





- POLST


Patient has POLST: Yes


POLST Status: DNR





Meds/Allgy





- Home Medications


Home Medications: 


                                Ambulatory Orders











 Medication  Instructions  Recorded  Confirmed


 


ALPRAZolam [Alprazolam] 0.25 mg PO Q4HR PRN MDD every4-6h 16





 PRN  


 


Baclofen 10 mg PO TID PRN 10/12/19 03/23/20


 


Divalproex Sodium 125 mg PO BID 10/12/19 03/23/20


 


Esomeprazole Magnesium 40 mg PO QDAC 10/12/19 03/23/20


 


Lovastatin 80 mg PO QPM 10/12/19 03/23/20


 


Mirtazapine 7.5 mg PO QPM 10/12/19 03/23/20


 


Potassium Chloride 20 meq PO DAILY 10/12/19 03/23/20


 


ALPRAZolam [Alprazolam] 0.25 mg PO BID 10/13/19 03/23/20


 


Aspirin [Aspirin EC] 81 mg PO DAILY 10/13/19 03/23/20


 


Dorzolamide HCl/Timolol Maleat 1 drops EACHEYE BID 10/13/19 03/23/20





[Dorzolamide-Timolol Eye Drops]   


 


Latanoprost/Pf [Latanoprost 0.005% 1 drops EACHEYE QPM 10/13/19 03/23/20





Eye Drop]   


 


Pregabalin 50 mg PO BID 10/13/19 03/23/20


 


Brimonidine 0.2% Ophth Drops 1 drops RIGHTEYE BID 20





[Alphagan P 0.2% Ophth Drops]   


 


Cholecalciferol (Vitamin D3) 1,000 unit PO DAILY 20





[Vitamin D3]   


 


Famotidine [Pepcid] 20 mg PO DAILY 20


 


Ferrous Sulfate 325 mg PO DAILY 20


 


Lidocaine [Aspercreme Lidocaine] 2 patch TP DAILY PRN MDD to b/l BKA 20


 


Senna [Senokot] 8.6 mg PO QPM MDD Hold loose stools 20


 


polyethylene glycoL 3350 [Miralax] 17 gm PO DAILY PRN 20














- Allergies


Allergies/Adverse Reactions: 


                                    Allergies











Allergy/AdvReac Type Severity Reaction Status Date / Time


 


gentamicin [Gentamicin] Allergy  Unknown Verified 19 12:48


 


lisinopril Allergy  Unknown Verified 19 12:48


 


paroxetine [From Paxil] Allergy  Unknown Verified 19 07:33














Review of Systems





- Other Findings


Other Findings: 


Review of system is limited due to patient's altered mental status





Prior Level of Functionality: 


Patient is bilateral below the knee amputee due to peripheral vascular disease. 

She has prosthesis which she is usually able to put on during the day with some 

assistance and get around.  She has home health care aides who come and  

through  for 4 hours daily to assist with personal cares.


Patient lives at home with her 








Exam





- Vital Signs


Vital Signs: 





                                Vital Signs x48h











  Temp Pulse Resp BP Pulse Ox


 


 20 00:00   84  14  122/74  93


 


 20 23:48  37.1 C  82  16  164/82 H  95


 


 20 22:10  37.7 C H  74  16  154/91 H  93














- Physical Exam


General Appearance: positive: No acute distress, Other (Patient not following 

commands)


Eyes Bilateral: positive: Other (left pupil reactive to light appropriately 

Right pupil appears slightly cloudy and not very reactive)


ENT: positive: ENT inspection nml


Neck: positive: No JVD, Trachea midline


Respiratory: positive: Chest non-tender, No respiratory distress, Breath sounds 

nml


Cardiovascular: positive: Regular rate & rhythm


Abdomen: positive: Non-tender, No organomegaly, Nml bowel sounds, No distention.

 negative: Guarding, Rebound


Skin: positive: Color nml, No rash, Warm, Dry


Extremities: positive: Other (bilateral BKA)


Neurologic/Psychiatric: positive: Disoriented to person, Disoriented to place, 

Disoriented to time





Conclusion/Plan





- Problem List


(1) Altered mental status


Conclusion/Plan: 


Etiology undetermined.


However patient has mild dementia


Patient's toxicology was positive for benzodiazepine.


Patient is also on Lyrica, baclofen, alprazolam, mirtazapine and divalproex.


We will hold patient's medications and monitor the patient through the night 

anticipating a change in her mental status


Work-up at this point had included a CT of the brain without contrast, BMP, CBC,

UA and ABG which have been all unremarkable


Qualifiers: 


   Altered mental status type: unspecified   Qualified Code(s): R41.82 - Altered

mental status, unspecified   





(2) Hyperlipidemia


Conclusion/Plan: 


On lovastatin








(3) Glaucoma


Conclusion/Plan: 


On latanoprost and dorzolamide/timolol








(4) GERD (gastroesophageal reflux disease)


Conclusion/Plan: 


Will order Protonix








(5) Depression


Conclusion/Plan: 


Patient is normally on mirtazapine.


Currently held.  Will resume when patient's mental status is improved








- Lab Results


Fish Bones: 


                                 20 22:26





                                 20 22:26





Core Measures





- Anticipated LOS


I expect patient to be DC'd or transferred within 96 hours.: Yes





- DVT/VTE - Prophylaxis


VTE/DVT Device ordered at admit?: No


VTE/DVT Prophylaxis med ordered at admit?: Yes

## 2020-05-12 NOTE — PHARMACY PROGRESS NOTE
- Best Possible Medication History


Admit Date and Time: 05/12/20 0049


Processed by: Pharmacy


Medication History completed: Yes


Patient Interview: Pt unable to participate


Secondary Source(s): Physician records, Pharmacy records, Insurance records





As the person ultimately responsible for medication therapy, providers are able 

to order a medication from an existing home medication list in [facility] via the 

"Reconcile Routine" prior to Confirmation of that medication by support staff. 

Such practice is discouraged except when the physician, in their clinical 

judgment, deems that a medical need exists for a medication without regard to 

previous use.

## 2020-05-13 LAB
ALBUMIN DIAFP-MCNC: 3.4 G/DL (ref 3.2–5.5)
ALBUMIN/GLOB SERPL: 0.7 {RATIO} (ref 1–2.2)
ALP SERPL-CCNC: 95 IU/L (ref 42–121)
ALT SERPL W P-5'-P-CCNC: < 10 IU/L (ref 10–60)
ANION GAP SERPL CALCULATED.4IONS-SCNC: 10 MMOL/L (ref 6–13)
AST SERPL W P-5'-P-CCNC: 18 IU/L (ref 10–42)
BASOPHILS NFR BLD AUTO: 0 10^3/UL (ref 0–0.1)
BASOPHILS NFR BLD AUTO: 0.5 %
BILIRUB BLD-MCNC: 0.8 MG/DL (ref 0.2–1)
BUN SERPL-MCNC: 7 MG/DL (ref 6–20)
CALCIUM UR-MCNC: 9.5 MG/DL (ref 8.5–10.3)
CHLORIDE SERPL-SCNC: 111 MMOL/L (ref 101–111)
CO2 SERPL-SCNC: 21 MMOL/L (ref 21–32)
CREAT SERPLBLD-SCNC: 0.9 MG/DL (ref 0.4–1)
EOSINOPHIL # BLD AUTO: 0 10^3/UL (ref 0–0.7)
EOSINOPHIL NFR BLD AUTO: 0.5 %
ERYTHROCYTE [DISTWIDTH] IN BLOOD BY AUTOMATED COUNT: 18.2 % (ref 12–15)
GLOBULIN SER-MCNC: 5.1 G/DL (ref 2.1–4.2)
GLUCOSE SERPL-MCNC: 91 MG/DL (ref 70–100)
HGB UR QL STRIP: 11.2 G/DL (ref 12–16)
LYMPHOCYTES # SPEC AUTO: 3.1 10^3/UL (ref 1.5–3.5)
LYMPHOCYTES NFR BLD AUTO: 46.5 %
MAGNESIUM SERPL-MCNC: 1.7 MG/DL (ref 1.7–2.8)
MCH RBC QN AUTO: 26.2 PG (ref 27–31)
MCHC RBC AUTO-ENTMCNC: 28.9 G/DL (ref 32–36)
MCV RBC AUTO: 90.9 FL (ref 81–99)
MONOCYTES # BLD AUTO: 1 10^3/UL (ref 0–1)
MONOCYTES NFR BLD AUTO: 14.3 %
NEUTROPHILS # BLD AUTO: 2.5 10^3/UL (ref 1.5–6.6)
NEUTROPHILS # SNV AUTO: 6.7 X10^3/UL (ref 4.8–10.8)
NEUTROPHILS NFR BLD AUTO: 37.9 %
PDW BLD AUTO: 12.1 FL (ref 7.9–10.8)
PHOSPHATE BLD-MCNC: 2.8 MG/DL (ref 2.5–4.6)
PLATELET # BLD: 577 10^3/UL (ref 130–450)
PROT SPEC-MCNC: 8.5 G/DL (ref 6.7–8.2)
RBC MAR: 4.27 10^6/UL (ref 4.2–5.4)
SODIUM SERPLBLD-SCNC: 142 MMOL/L (ref 135–145)
TSH SERPL-ACNC: 2.35 UIU/ML (ref 0.34–5.6)
VALPROATE SERPL-SCNC: 13.9 UG/ML
VIT B12 SERPL-MCNC: 779 PG/ML (ref 180–914)

## 2020-05-13 RX ADMIN — FERROUS SULFATE TAB 325 MG (65 MG ELEMENTAL FE) SCH: 325 (65 FE) TAB at 11:39

## 2020-05-13 RX ADMIN — DIVALPROEX SODIUM SCH MG: 125 TABLET, DELAYED RELEASE ORAL at 21:05

## 2020-05-13 RX ADMIN — ASPIRIN SCH: 81 TABLET, COATED ORAL at 11:39

## 2020-05-13 RX ADMIN — DIVALPROEX SODIUM SCH: 125 TABLET, DELAYED RELEASE ORAL at 21:39

## 2020-05-13 RX ADMIN — MIRTAZAPINE SCH MG: 15 TABLET, FILM COATED ORAL at 21:05

## 2020-05-13 RX ADMIN — PREGABALIN SCH: 25 CAPSULE ORAL at 21:39

## 2020-05-13 RX ADMIN — DORZOLAMIDE HYDROCHLORIDE AND TIMOLOL MALEATE SCH: 20; 5 SOLUTION OPHTHALMIC at 11:39

## 2020-05-13 RX ADMIN — PANTOPRAZOLE SODIUM SCH MG: 40 TABLET, DELAYED RELEASE ORAL at 21:20

## 2020-05-13 RX ADMIN — ATORVASTATIN CALCIUM SCH: 40 TABLET, FILM COATED ORAL at 21:44

## 2020-05-13 RX ADMIN — HEPARIN SODIUM SCH: 5000 INJECTION, SOLUTION INTRAVENOUS; SUBCUTANEOUS at 22:02

## 2020-05-13 RX ADMIN — DORZOLAMIDE HYDROCHLORIDE AND TIMOLOL MALEATE SCH: 20; 5 SOLUTION OPHTHALMIC at 21:39

## 2020-05-13 RX ADMIN — MIRTAZAPINE SCH: 15 TABLET, FILM COATED ORAL at 21:39

## 2020-05-13 RX ADMIN — POTASSIUM CHLORIDE, DEXTROSE MONOHYDRATE AND SODIUM CHLORIDE SCH MLS/HR: 150; 5; 450 INJECTION, SOLUTION INTRAVENOUS at 05:06

## 2020-05-13 RX ADMIN — PANTOPRAZOLE SODIUM SCH MG: 40 TABLET, DELAYED RELEASE ORAL at 17:53

## 2020-05-13 RX ADMIN — ATORVASTATIN CALCIUM SCH MG: 40 TABLET, FILM COATED ORAL at 21:05

## 2020-05-13 RX ADMIN — HEPARIN SODIUM SCH: 5000 INJECTION, SOLUTION INTRAVENOUS; SUBCUTANEOUS at 11:39

## 2020-05-13 RX ADMIN — SODIUM CHLORIDE, PRESERVATIVE FREE SCH: 5 INJECTION INTRAVENOUS at 16:43

## 2020-05-13 RX ADMIN — PREGABALIN SCH MG: 25 CAPSULE ORAL at 21:05

## 2020-05-13 RX ADMIN — SODIUM CHLORIDE, PRESERVATIVE FREE SCH: 5 INJECTION INTRAVENOUS at 01:06

## 2020-05-13 RX ADMIN — SODIUM CHLORIDE, PRESERVATIVE FREE SCH: 5 INJECTION INTRAVENOUS at 11:39

## 2020-05-13 RX ADMIN — DIVALPROEX SODIUM SCH: 125 TABLET, DELAYED RELEASE ORAL at 11:39

## 2020-05-13 NOTE — CONSULTATION NOTE
Palliative Care Follow Up





- Referral


Referring Provider: Rasta CHAWLA


Time of Visit: 11:30-12:05:12:45-13:00; [id number]


Referral setting: Hospitalized patient


Referral Reason: AMS/Peripheral Vascular Disease/Vascular dementia





- Information Sources


Records reviewed: RN notes reviewed, Previous records reviewed


History/Review of Systems obtained from: Family ( Geoff), Nursing


Exam limitations: Clinical condition (patient not engaging in conversation other

than strident "no's")





- History of Present Illness Update


Brief HPI Update: 


This is a 80-year-old -American woman who is Creole background, who has a

history of coronary artery disease, peripheral vascular disease status post 

bilateral below-knee amputation, vascular dementia, major cognitive disorder as 

well as essential thrombocytopenia. Patient was previously seen by palliative 

care service when she was a resident at [facility], eventually was able to return 

home, and has been cared for by her  for the last two years with caregiv

ing assistance. 





They have been doing actually fairly well.  She had regained some weight, her 

symptoms are well managed, her care needs were being met.  She has had recurrent

UTIs, and patient was admitted acutely to [facility] for altered mental 

status, and significant concern for change from her baseline.  Patient on 

Mother's Day, had actually had a good day, had called family, they went for a 

ride, she did receive a dose of baclofen that night for her phantom pain.  He 

reports this is not unusual for several times a month.  Monday morning, she was 

more difficult to rise, and continued to deteriorate through the day.  He did 

call 911.  She has been worked up with a CT of the brain without any acute 

findings, her labs were normal, and your UA was negative.





Patient currently not cooperative with care.  Laying in the bed, yelling no to 

any kind of suggestions.  Will not let staff care for her.  Refusing to eat and 

medications.  Unfortunately she is on medications of concern for withdrawal, 

including her Lyrica and mirtazapine, and currently has not been taking her 

divalproex.





On exam, patient is quite awake and alert, is looking at me quite suspiciously. 

When I asked if there was anything I could do to make things better, she said "I

do not want to make things better".  She denies any pain, trouble with 

breathing, will not let me physically examine or approach her.  She does not 

present with any respiratory distress, she has difficulty focusing, but 

currently is moving all extremities.





Palliative care is been asked to consult regarding goals of care, such received 

her  is quite distressed, original conversations had focused on possible 

transition to SNF, patient to be evaluated for transition to hospice.





Week past medical history includes high cholesterol, coronary artery disease, 

peripheral artery disease, asthma, vascular dementia, GERD, hiatal hernia, 

chronic vision loss left eye, glaucoma, osteoporosis, history of osteomyelitis 

right foot, history of angioplasty in 89,








Social History





- Living Situation


Living arrangement: At home


Living Situation: With spouse/s.o.


Support System: 


Patient and  have been  for 59 years, he reports that they are 

very close, he is quite happy to have her home currently.  He is quite 

frightened that if she were to continue to decline, that she not be in the 

hospital or in a nursing home.  He continues to hope for the best, but 

recognizes and accepts if this is patient's time, he would want it to be part of

this.  He reports he is going to need more help, has call out to his [employer] 

worker Kat Kate, he already has caregivers in place it, 5 days a week, he is 

hoping for more hours.  Most likely would qualify if transition to hospice.  

They do have 2 children, 1 daughter and 1 son, estranged from the daughter who 

lives locally.  He does feel he has other resources he can and is trying to put 

together a care plan.  He himself is the middle of prostate cancer treatment, f

eels like they have been managing fine.








Medications/Allergies





- Medications


Active Medication List: 





Active Medications





Aspirin (Ecotrin)  81 mg PO DAILY Formerly [facility]


   Last Admin: 05/13/20 11:39 Dose:  Not Given


Divalproex Sodium (Depakote Dr)  125 mg PO BID Formerly [facility]


   Last Admin: 05/13/20 11:39 Dose:  Not Given


Dorzolamide/Timolol (Cosopt)  1 drops EACHEYE BID Formerly [facility]


   Last Admin: 05/13/20 11:39 Dose:  Not Given


Ferrous Sulfate (Feosol)  325 mg PO DAILY Formerly [facility]


   Last Admin: 05/13/20 11:39 Dose:  Not Given


Haloperidol (Haldol Oral Soln)  5 mg PO Q4HR PRN


   PRN Reason: Agitation


Heparin Sodium (Porcine) ()  5,000 unit SUBQ BID Formerly [facility]


   Last Admin: 05/13/20 11:39 Dose:  Not Given


Ondansetron HCl (Zofran Inj)  4 mg IVP Q6HR PRN


   PRN Reason: Nausea / Vomiting


Sodium Chloride (Normal Saline Flush 0.9%)  10 ml IVP PRN PRN


   PRN Reason: AS NEEDED PER PROVIDER ORDERS


Sodium Chloride (Normal Saline Flush 0.9%)  10 ml IVP 0100,0900,1700 Formerly [facility]


   Last Admin: 05/13/20 11:39 Dose:  Not Given





                                        





ALPRAZolam [Alprazolam] 0.25 mg PO Q4HR PRN MDD 4 DOSES OF 0.25MG 05/17/16 


Divalproex Sodium 125 mg PO BID 10/12/19 


Esomeprazole Magnesium 40 mg PO QDAC 10/12/19 


Lovastatin 80 mg PO QPM 10/12/19 


Mirtazapine 7.5 mg PO QPM 10/12/19 


Aspirin [Aspirin EC] 81 mg PO DAILY 10/13/19 


Pregabalin 50 mg PO BID 10/13/19 


Cholecalciferol (Vitamin D3) [Vitamin D3] 1,000 unit PO DAILY 03/23/20 


Ferrous Sulfate 325 mg PO DAILY 03/23/20 


Dorzolamide HCl/Timolol Maleat [Dorzolamide-Timolol Eye Drops] 1 drops EACHEYE 

BID 05/12/20 











- Allergies


Allergies/Adverse Reactions: 


                                    Allergies











Allergy/AdvReac Type Severity Reaction Status Date / Time


 


gentamicin [Gentamicin] Allergy  Unknown Verified 12/13/19 12:48


 


lisinopril Allergy  Unknown Verified 12/13/19 12:48


 


paroxetine [From Paxil] Allergy  Unknown Verified 12/17/19 07:33














Review of Systems





- Constitutional


Constitutional: reports: Fatigue, Other (refuring to eat;).  denies: Fever, 

Chills





- Eyes


Eyes: reports: Vision loss





- Ears, Nose & Throat


Ears, Nose & Throat: reports: Dry mouth





- Respiratory


Respiratory: denies: SOB at rest





- Gastrointestinal


Gastrointestinal: reports: Other (refusing to eat)





- Genitourinary


Genitourinary: reports: Incontinence (usually continent)





- Musculoskeletal


Musculoskeletal: reports: Other (currently bedbound; ambulatory at home with 

legs/walker;)





- Integumentary


Integumentary: reports: Dryness





- Neurological


Neurological: reports: Memory problems





- Psychiatric


Psychiatric: reports: Aggitation





- Hematologic/Lymphatic


Hematologic/Lymphatic: reports: Recurrent infections (UTIs)





- All Other Systems


All Other Systems: reports: Other (limited ROS)





Physical Exam





- Vital Signs


Vital Signs: 





                                Vital Signs x48h











  Temp Pulse Resp BP Pulse Ox


 


 05/13/20 16:06  37.5 C  89  18  151/59 H  95


 


 05/13/20 14:51  37.6 C H  89  16  172/93 H  99














- Physical Exam


General Appearance: positive: Severe distress, Anxious


Eyes Bilateral: positive: No scleral icterus


ENT: positive: Dry mucous membranes


Neck: positive: Trachea midline


Respiratory: positive: No respiratory distress


Abdomen: positive: Other (refused exam)


Skin: positive: Dryness


Neurologic/Psychiatric: positive: Disoriented to person, Disoriented to place, 

Disoriented to time, Weakness, Depressed mood/affect, Flat affect, Other 

(agitated)





Palliative Care





- POLST


Patient has POLST: Yes


POLST Status: DNR, Selective Treatment


Pain: No pain (patient denies; off lyrica; will need to restart as soon as able)


Performance Status: 


patient currently in bed; not allowing care. previous level of functioning able 

to get up/ambulate with walker / legs with standby and contact assist








- Palliative Care


Discussion: 


 1345 Discussed with  unable to identify any underlying etiology for her 

altered mental status.  Had ruled out all obvious things that were correctable, 

UTI, no change in her usual routine. But now presents as combative, unable to 

participate in care, only saying no no no, and laying in bed with eyes open 

staring at ceiling.  Denies any pain or distress.  Has been very much wants to 

bring her home, is excepting this may be her decline, he does understand he 

would need some more help, is working on trying to find some increased support. 

He worries about her, he is quite emotional and thinking about losing her is 

"more than I can take".  But he does absolutely want to bring her home at this 

point in time.





We discussed his goals for her, he is hoping for the best that if she comes home

will stabilize out and participate in eating and taking medications.  If she 

were to continue decline, he would want to be with her, he would not be able to 

be with her in a SNF or in the hospital for an end-of-life event.  We discussed 

the role of hospice and providing support and direction, he would be accepting 

of this.  He does understand she is quite fragile, and may continue to decline. 

We did agree to help with their communication, I would bring the phone to her.  

Did recommend he consider coming in, he is quite anxious to do this, given his 

own health, and not wanting to expose himself for her to COVID-19.  My 

understanding that this point time she is COVID negative.





1430 Did facilitate communication with patient and .  Light up, did 

recognize patient, was able to have conversation, they did talk back-and-forth 

about living each other, he begged her to eat, and to go along with medications.

 She was telling him yes, but when hung up said no.  He very much wants to bring

her home, and gave her this messaging.





Did speak with him regarding continue to follow-up and try and get some 

increased help, hospice does not have an opening until Friday afternoon, but 

would recommend discharge tomorrow.














Results





- Lab Results


Lab results reviewed: Yes


Fish Bones: 


                                 05/13/20 05:15





                                 05/13/20 05:15


Lab and Imaging Results: 





                               Lab Results x24hrs











  05/13/20 05/13/20 05/13/20 Range/Units





  05:15 05:15 05:15 


 


WBC     (4.8-10.8)  x10^3/uL


 


RBC     (4.20-5.40)  10^6/uL


 


Hgb     (12.0-16.0)  g/dL


 


Hct     (37.0-47.0)  %


 


MCV     (81.0-99.0)  fL


 


MCH     (27.0-31.0)  pg


 


MCHC     (32.0-36.0)  g/dL


 


RDW     (12.0-15.0)  %


 


Plt Count     (130-450)  10^3/uL


 


MPV     (7.9-10.8)  fL


 


Neut # (Auto)     (1.5-6.6)  10^3/uL


 


Lymph # (Auto)     (1.5-3.5)  10^3/uL


 


Mono # (Auto)     (0.0-1.0)  10^3/uL


 


Eos # (Auto)     (0.0-0.7)  10^3/uL


 


Baso # (Auto)     (0.0-0.1)  10^3/uL


 


Absolute Nucleated RBC     x10^3/uL


 


Nucleated RBC %     /100WBC


 


Sodium  142    (135-145)  mmol/L


 


Potassium  3.7    (3.5-5.0)  mmol/L


 


Chloride  111    (101-111)  mmol/L


 


Carbon Dioxide  21    (21-32)  mmol/L


 


Anion Gap  10.0    (6-13)  


 


BUN  7    (6-20)  mg/dL


 


Creatinine  0.9    (0.4-1.0)  mg/dL


 


Estimated GFR (MDRD)  73 L    (>89)  


 


Glucose  91    ()  mg/dL


 


Calcium  9.5    (8.5-10.3)  mg/dL


 


Phosphorus  2.8    (2.5-4.6)  mg/dL


 


Magnesium  1.7    (1.7-2.8)  mg/dL


 


Total Bilirubin  0.8    (0.2-1.0)  mg/dL


 


AST  18    (10-42)  IU/L


 


ALT  < 10 L    (10-60)  IU/L


 


Alkaline Phosphatase  95    ()  IU/L


 


Ammonia     (7-35)  umol/L


 


Total Protein  8.5 H    (6.7-8.2)  g/dL


 


Albumin  3.4    (3.2-5.5)  g/dL


 


Globulin  5.1 H    (2.1-4.2)  g/dL


 


Albumin/Globulin Ratio  0.7 L    (1.0-2.2)  


 


Vitamin B12    779  (180-914)  pg/mL


 


TSH    2.35  (0.34-5.60)  uIU/mL


 


Last Dose Date   UNK   


 


Last Dose Time   UNK   


 


Valproic Acid   13.9   ug/mL


 


Coronavirus (PCR)     














  05/13/20 05/13/20 05/11/20 Range/Units





  05:15 05:15 23:09 


 


WBC   6.7   (4.8-10.8)  x10^3/uL


 


RBC   4.27   (4.20-5.40)  10^6/uL


 


Hgb   11.2 L   (12.0-16.0)  g/dL


 


Hct   38.8   (37.0-47.0)  %


 


MCV   90.9   (81.0-99.0)  fL


 


MCH   26.2 L   (27.0-31.0)  pg


 


MCHC   28.9 L   (32.0-36.0)  g/dL


 


RDW   18.2 H   (12.0-15.0)  %


 


Plt Count   577 H   (130-450)  10^3/uL


 


MPV   12.1 H   (7.9-10.8)  fL


 


Neut # (Auto)   2.5   (1.5-6.6)  10^3/uL


 


Lymph # (Auto)   3.1   (1.5-3.5)  10^3/uL


 


Mono # (Auto)   1.0   (0.0-1.0)  10^3/uL


 


Eos # (Auto)   0.0   (0.0-0.7)  10^3/uL


 


Baso # (Auto)   0.0   (0.0-0.1)  10^3/uL


 


Absolute Nucleated RBC   0.05   x10^3/uL


 


Nucleated RBC %   0.8   /100WBC


 


Sodium     (135-145)  mmol/L


 


Potassium     (3.5-5.0)  mmol/L


 


Chloride     (101-111)  mmol/L


 


Carbon Dioxide     (21-32)  mmol/L


 


Anion Gap     (6-13)  


 


BUN     (6-20)  mg/dL


 


Creatinine     (0.4-1.0)  mg/dL


 


Estimated GFR (MDRD)     (>89)  


 


Glucose     ()  mg/dL


 


Calcium     (8.5-10.3)  mg/dL


 


Phosphorus     (2.5-4.6)  mg/dL


 


Magnesium     (1.7-2.8)  mg/dL


 


Total Bilirubin     (0.2-1.0)  mg/dL


 


AST     (10-42)  IU/L


 


ALT     (10-60)  IU/L


 


Alkaline Phosphatase     ()  IU/L


 


Ammonia  21.8    (7-35)  umol/L


 


Total Protein     (6.7-8.2)  g/dL


 


Albumin     (3.2-5.5)  g/dL


 


Globulin     (2.1-4.2)  g/dL


 


Albumin/Globulin Ratio     (1.0-2.2)  


 


Vitamin B12     (180-914)  pg/mL


 


TSH     (0.34-5.60)  uIU/mL


 


Last Dose Date     


 


Last Dose Time     


 


Valproic Acid     ug/mL


 


Coronavirus (PCR)    NEGATIVE  














Impression and Recommendations





- Palliative Care


Impression: 


This is an 80-year-old -American female, who has presented acutely to 

[facility] with sudden change in status, now presenting with 

neuropsychiatric behaviors of agitation, anxiety, and not cooperating with care.

 She is at high risk for further decline, and refusing any intake, as well as wi

thdrawal from her baseline medications.  Family conference with  

regarding goals of care, would like to bring her home, recognizes her risk for 

imminent decline, will transition home with hospice.





Recommendations/Counseling Done: 


1. Dementia with behavioral disturbances.  Patient is currently not receiving 

her Depakote, no her baseline Remeron, Lyrica, concern regarding patient 

experiencing eventual withdrawal symptoms.  Recommend to at least initiate 

Haldol 5 mg every 2 until effect, Patient will need to be able to be transported

home, may require BLS.





2.  Altered mental status.  Patient is significantly different from baseline, no

identifiable underlying etiology.  Patient most likely would benefit from being 

at home in her own routine, with familiar caregivers, and  support.  

Recommending discharge as soon as patient able. Patient does respond to ,

report with nursing staff to consider calling has been and put on speaker phone 

to help her cooperate with caregiving tasks, medications, and eating.





3.  Advanced care planning.  Patient does have POLST, with [facility].  Reviewed with

 concern regarding patient's decline, he is hoping for the best that she 

will recover some, but would like to be prepared for the worst with patient at 

home for her end-of-life event.  Discussed hospice support, he will be 

contacting for further increase caregiving support, is quite clear no SNF 

placement at this time given COVID-19 restrictions.  Staffed with Dr. Murdock 

medical hospice director, will accept patient.  No opening though until Friday, 

more important patient gets home and in familiar environment.





Time Spent: 


80 minutes with greater than 50% of this done in counseling, family conferencing

regarding goals of care/anticipatory guidance, coordination of care with hospice

and hospital team.

## 2020-05-13 NOTE — PROVIDER PROGRESS NOTE
Subjective





- Prog Note Date


Prog Note Date: 05/13/20





- Subjective


Pt reports feeling: No change


Subjective: 


pt refused to eat anything and take meds. pt pulled her own IV line and tele 

monitor. when tried to assess pt, she refused. when ask questions to her, all 

her answer were "no". I reported and update pt's medical conditions to pt's 

 Geoff Mcnamara at [phone], and pt's Covid 19 test was negative. 

He hope his wife can be d/c to home not nurse home on today. but When I 

discussed the care plan with Mr. Mcnamara, pt's , he hope pt be d/c to 

nurse home on yesterday. He changed his mind. palliative care Bianca will discuss

with pt's  in detail 








Current Medications





- Current Medications


Current Medications: 





Active Medications





Aspirin (Ecotrin)  81 mg PO DAILY [facility]


   Last Admin: 05/13/20 11:39 Dose:  Not Given


Divalproex Sodium (Depakote Dr)  125 mg PO BID [facility]


   Last Admin: 05/13/20 11:39 Dose:  Not Given


Dorzolamide/Timolol (Cosopt)  1 drops EACHEYE BID [facility]


   Last Admin: 05/13/20 11:39 Dose:  Not Given


Ferrous Sulfate (Feosol)  325 mg PO DAILY [facility]


   Last Admin: 05/13/20 11:39 Dose:  Not Given


Haloperidol (Haldol Oral Soln)  5 mg PO Q4HR PRN


   PRN Reason: Agitation


Heparin Sodium (Porcine) ()  5,000 unit SUBQ BID [facility]


   Last Admin: 05/13/20 11:39 Dose:  Not Given


Ondansetron HCl (Zofran Inj)  4 mg IVP Q6HR PRN


   PRN Reason: Nausea / Vomiting


Sodium Chloride (Normal Saline Flush 0.9%)  10 ml IVP PRN PRN


   PRN Reason: AS NEEDED PER PROVIDER ORDERS


Sodium Chloride (Normal Saline Flush 0.9%)  10 ml IVP 0100,0900,1700 [facility]


   Last Admin: 05/13/20 11:39 Dose:  Not Given





                                        





ALPRAZolam [Alprazolam] 0.25 mg PO Q4HR PRN MDD 4 DOSES OF 0.25MG 05/17/16 


Divalproex Sodium 125 mg PO BID 10/12/19 


Esomeprazole Magnesium 40 mg PO QDAC 10/12/19 


Lovastatin 80 mg PO QPM 10/12/19 


Mirtazapine 7.5 mg PO QPM 10/12/19 


Aspirin [Aspirin EC] 81 mg PO DAILY 10/13/19 


Pregabalin 50 mg PO BID 10/13/19 


Cholecalciferol (Vitamin D3) [Vitamin D3] 1,000 unit PO DAILY 03/23/20 


Ferrous Sulfate 325 mg PO DAILY 03/23/20 


Dorzolamide HCl/Timolol Maleat [Dorzolamide-Timolol Eye Drops] 1 drops EACHEYE 

BID 05/12/20 











Objective





- Vital Signs/Intake & Output


Vital Signs: 


                                Vital Signs x48h











  Temp Pulse Resp BP Pulse Ox


 


 05/13/20 16:06  37.5 C  89  18  151/59 H  95


 


 05/13/20 14:51  37.6 C H  89  16  172/93 H  99











Intake & Output: 


                                 Intake & Output











 05/10/20 05/11/20 05/12/20 05/13/20





 23:59 23:59 23:59 23:59


 


Intake Total  100 1540 1026.389


 


Balance  100 1540 1026.389














- Objective


General Appearance: positive: Alert, Mild distress.  negative: Lethargic


Eyes Bilateral: positive: Normal inspection, No lid inflammation


ENT: positive: ENT inspection nml, No signs of dehydration.  negative: Purulent 

nasal drainage


Neck: positive: Nml inspection, Trachea midline.  negative: Thyromegaly, 

Tracheal deviation


Respiratory: positive: Other (pt refused to have assessment)


Cardiovascular: positive: Other (pt refused to have assessment)


Abdomen: positive: Other (pt refused to have assessment)


Neurologic/Psychiatric: positive: Other (pt refused to have assessment)





- Lab Results


Fish Bones: 


                                 05/14/20 05:30





                                 05/14/20 05:30


Other Labs: 


                               Lab Results x24hrs











  05/13/20 05/13/20 05/13/20 Range/Units





  05:15 05:15 05:15 


 


WBC     (4.8-10.8)  x10^3/uL


 


RBC     (4.20-5.40)  10^6/uL


 


Hgb     (12.0-16.0)  g/dL


 


Hct     (37.0-47.0)  %


 


MCV     (81.0-99.0)  fL


 


MCH     (27.0-31.0)  pg


 


MCHC     (32.0-36.0)  g/dL


 


RDW     (12.0-15.0)  %


 


Plt Count     (130-450)  10^3/uL


 


MPV     (7.9-10.8)  fL


 


Neut # (Auto)     (1.5-6.6)  10^3/uL


 


Lymph # (Auto)     (1.5-3.5)  10^3/uL


 


Mono # (Auto)     (0.0-1.0)  10^3/uL


 


Eos # (Auto)     (0.0-0.7)  10^3/uL


 


Baso # (Auto)     (0.0-0.1)  10^3/uL


 


Absolute Nucleated RBC     x10^3/uL


 


Nucleated RBC %     /100WBC


 


Sodium  142    (135-145)  mmol/L


 


Potassium  3.7    (3.5-5.0)  mmol/L


 


Chloride  111    (101-111)  mmol/L


 


Carbon Dioxide  21    (21-32)  mmol/L


 


Anion Gap  10.0    (6-13)  


 


BUN  7    (6-20)  mg/dL


 


Creatinine  0.9    (0.4-1.0)  mg/dL


 


Estimated GFR (MDRD)  73 L    (>89)  


 


Glucose  91    ()  mg/dL


 


Calcium  9.5    (8.5-10.3)  mg/dL


 


Phosphorus  2.8    (2.5-4.6)  mg/dL


 


Magnesium  1.7    (1.7-2.8)  mg/dL


 


Total Bilirubin  0.8    (0.2-1.0)  mg/dL


 


AST  18    (10-42)  IU/L


 


ALT  < 10 L    (10-60)  IU/L


 


Alkaline Phosphatase  95    ()  IU/L


 


Ammonia     (7-35)  umol/L


 


Total Protein  8.5 H    (6.7-8.2)  g/dL


 


Albumin  3.4    (3.2-5.5)  g/dL


 


Globulin  5.1 H    (2.1-4.2)  g/dL


 


Albumin/Globulin Ratio  0.7 L    (1.0-2.2)  


 


Vitamin B12    779  (180-914)  pg/mL


 


TSH    2.35  (0.34-5.60)  uIU/mL


 


Last Dose Date   UNK   


 


Last Dose Time   UNK   


 


Valproic Acid   13.9   ug/mL


 


Coronavirus (PCR)     














  05/13/20 05/13/20 05/11/20 Range/Units





  05:15 05:15 23:09 


 


WBC   6.7   (4.8-10.8)  x10^3/uL


 


RBC   4.27   (4.20-5.40)  10^6/uL


 


Hgb   11.2 L   (12.0-16.0)  g/dL


 


Hct   38.8   (37.0-47.0)  %


 


MCV   90.9   (81.0-99.0)  fL


 


MCH   26.2 L   (27.0-31.0)  pg


 


MCHC   28.9 L   (32.0-36.0)  g/dL


 


RDW   18.2 H   (12.0-15.0)  %


 


Plt Count   577 H   (130-450)  10^3/uL


 


MPV   12.1 H   (7.9-10.8)  fL


 


Neut # (Auto)   2.5   (1.5-6.6)  10^3/uL


 


Lymph # (Auto)   3.1   (1.5-3.5)  10^3/uL


 


Mono # (Auto)   1.0   (0.0-1.0)  10^3/uL


 


Eos # (Auto)   0.0   (0.0-0.7)  10^3/uL


 


Baso # (Auto)   0.0   (0.0-0.1)  10^3/uL


 


Absolute Nucleated RBC   0.05   x10^3/uL


 


Nucleated RBC %   0.8   /100WBC


 


Sodium     (135-145)  mmol/L


 


Potassium     (3.5-5.0)  mmol/L


 


Chloride     (101-111)  mmol/L


 


Carbon Dioxide     (21-32)  mmol/L


 


Anion Gap     (6-13)  


 


BUN     (6-20)  mg/dL


 


Creatinine     (0.4-1.0)  mg/dL


 


Estimated GFR (MDRD)     (>89)  


 


Glucose     ()  mg/dL


 


Calcium     (8.5-10.3)  mg/dL


 


Phosphorus     (2.5-4.6)  mg/dL


 


Magnesium     (1.7-2.8)  mg/dL


 


Total Bilirubin     (0.2-1.0)  mg/dL


 


AST     (10-42)  IU/L


 


ALT     (10-60)  IU/L


 


Alkaline Phosphatase     ()  IU/L


 


Ammonia  21.8    (7-35)  umol/L


 


Total Protein     (6.7-8.2)  g/dL


 


Albumin     (3.2-5.5)  g/dL


 


Globulin     (2.1-4.2)  g/dL


 


Albumin/Globulin Ratio     (1.0-2.2)  


 


Vitamin B12     (180-914)  pg/mL


 


TSH     (0.34-5.60)  uIU/mL


 


Last Dose Date     


 


Last Dose Time     


 


Valproic Acid     ug/mL


 


Coronavirus (PCR)    NEGATIVE  














ABX Reporting


Has patient been on IV antibiotics over the past 48 hours?: No





Assessment/Plan





- Problem List


(1) Agitated


Impression: 


pt is very agitated. she pulled IV line and tele line, she refuse to eat, take 

meds. pt has hx of advanced dementia. discussed with pt's , and consult 

with palliative, likely pt will be d/c home with hospice care. will followup 

palliative care and continue to discuss with pt's family. continue nurse care 

and support. add Haldol PO solution PRN








(2) Advanced dementia


Impression: 


pt present advance dementia, refused to take any her meds, refused to eat, 

pulled out IV line and tele monitor. add Haldol oral solution PRN, resume home 

meds








(3) Anorexia


Impression: 


pt decline to eat and drink. discuss with pt and consult with palliative care, 

Plan is likely:will be DC on tomorrow with hospice care in the patient's home








(4) Chronic anemia


Impression: 


pt's HGB is 11.32, stable now, continue home ferrous








(5) HLD (hyperlipidemia)


Impression: 


stable, continue home meds








(6) Glaucoma


Impression: 


stable, continue home meds








(7) GERD (gastroesophageal reflux disease)


Impression: 


continue home PPI, pt has no complain of heart burning, and stable.

## 2020-05-14 ENCOUNTER — HOSPITAL ENCOUNTER (OUTPATIENT)
Dept: HOSPITAL 76 - EMS | Age: 80
Discharge: HOME | End: 2020-05-14
Attending: SURGERY
Payer: MEDICARE

## 2020-05-14 VITALS — DIASTOLIC BLOOD PRESSURE: 94 MMHG | SYSTOLIC BLOOD PRESSURE: 145 MMHG

## 2020-05-14 DIAGNOSIS — F03.90: Primary | ICD-10-CM

## 2020-05-14 DIAGNOSIS — Z89.512: ICD-10-CM

## 2020-05-14 DIAGNOSIS — Z89.511: ICD-10-CM

## 2020-05-14 LAB
ALBUMIN DIAFP-MCNC: 3.7 G/DL (ref 3.2–5.5)
ALBUMIN/GLOB SERPL: 0.6 {RATIO} (ref 1–2.2)
ALP SERPL-CCNC: 100 IU/L (ref 42–121)
ALT SERPL W P-5'-P-CCNC: < 10 IU/L (ref 10–60)
ANION GAP SERPL CALCULATED.4IONS-SCNC: 15 MMOL/L (ref 6–13)
AST SERPL W P-5'-P-CCNC: 21 IU/L (ref 10–42)
BASOPHILS NFR BLD AUTO: 0 10^3/UL (ref 0–0.1)
BASOPHILS NFR BLD AUTO: 0.4 %
BILIRUB BLD-MCNC: 0.8 MG/DL (ref 0.2–1)
BUN SERPL-MCNC: 8 MG/DL (ref 6–20)
CALCIUM UR-MCNC: 9.7 MG/DL (ref 8.5–10.3)
CHLORIDE SERPL-SCNC: 103 MMOL/L (ref 101–111)
CO2 SERPL-SCNC: 21 MMOL/L (ref 21–32)
CREAT SERPLBLD-SCNC: 1 MG/DL (ref 0.4–1)
EOSINOPHIL # BLD AUTO: 0 10^3/UL (ref 0–0.7)
EOSINOPHIL NFR BLD AUTO: 0.4 %
ERYTHROCYTE [DISTWIDTH] IN BLOOD BY AUTOMATED COUNT: 18 % (ref 12–15)
GLOBULIN SER-MCNC: 5.7 G/DL (ref 2.1–4.2)
GLUCOSE SERPL-MCNC: 85 MG/DL (ref 70–100)
HGB UR QL STRIP: 11.6 G/DL (ref 12–16)
LYMPHOCYTES # SPEC AUTO: 3.5 10^3/UL (ref 1.5–3.5)
LYMPHOCYTES NFR BLD AUTO: 40.6 %
MCH RBC QN AUTO: 26.6 PG (ref 27–31)
MCHC RBC AUTO-ENTMCNC: 29.4 G/DL (ref 32–36)
MCV RBC AUTO: 90.6 FL (ref 81–99)
MONOCYTES # BLD AUTO: 1 10^3/UL (ref 0–1)
MONOCYTES NFR BLD AUTO: 11.4 %
NEUTROPHILS # BLD AUTO: 4 10^3/UL (ref 1.5–6.6)
NEUTROPHILS # SNV AUTO: 8.5 X10^3/UL (ref 4.8–10.8)
NEUTROPHILS NFR BLD AUTO: 46.7 %
PDW BLD AUTO: 12.5 FL (ref 7.9–10.8)
PLATELET # BLD: 475 10^3/UL (ref 130–450)
PROT SPEC-MCNC: 9.4 G/DL (ref 6.7–8.2)
RBC MAR: 4.36 10^6/UL (ref 4.2–5.4)
SODIUM SERPLBLD-SCNC: 139 MMOL/L (ref 135–145)

## 2020-05-14 RX ADMIN — SODIUM CHLORIDE, PRESERVATIVE FREE SCH: 5 INJECTION INTRAVENOUS at 08:54

## 2020-05-14 RX ADMIN — SODIUM CHLORIDE, PRESERVATIVE FREE SCH: 5 INJECTION INTRAVENOUS at 03:31

## 2020-05-14 RX ADMIN — DORZOLAMIDE HYDROCHLORIDE AND TIMOLOL MALEATE SCH: 20; 5 SOLUTION OPHTHALMIC at 09:16

## 2020-05-14 RX ADMIN — PANTOPRAZOLE SODIUM SCH MG: 40 TABLET, DELAYED RELEASE ORAL at 06:47

## 2020-05-14 RX ADMIN — ASPIRIN SCH MG: 81 TABLET, COATED ORAL at 08:53

## 2020-05-14 RX ADMIN — FERROUS SULFATE TAB 325 MG (65 MG ELEMENTAL FE) SCH MG: 325 (65 FE) TAB at 08:53

## 2020-05-14 RX ADMIN — HEPARIN SODIUM SCH UNIT: 5000 INJECTION, SOLUTION INTRAVENOUS; SUBCUTANEOUS at 08:54

## 2020-05-14 RX ADMIN — PREGABALIN SCH MG: 25 CAPSULE ORAL at 08:53

## 2020-05-14 RX ADMIN — DIVALPROEX SODIUM SCH MG: 125 TABLET, DELAYED RELEASE ORAL at 08:53

## 2020-05-14 NOTE — DISCHARGE PLAN
Discharge Plan


Problem Reviewed?: Yes


Disposition: 50 Hospice/Home DC/Xfer


Condition: Serious


Prescriptions: 


LORazepam [Ativan] 0.5 mg PO Q6H PRN #15 tablet


 PRN Reason: Anxiety


Morphine Sulfate [Morphine Sulf Oral (Roxanol)] 5 mg PO Q4H PRN #30 ml


 PRN Reason: Pain/Dyspnea


Diet: Soft


Instruction Topics:  Lorazepam tablets, Morphine oral solution


Health Concerns: 


hospice care


Plan of Treatment: 


pt may followup hospice care on Friday 05/15/2020.


Care Goals: 


quality of life and advance care 


Assessment: 


discussed the care plan with pt's family, palliative care, referral to hospice 

care.


Additional Instructions or Follow Up instructions: 


Pt may followup hospice care on Friday, 05/15/2020.


No Smoking: If you smoke, Please STOP!  Call [phone] for help.


Follow-up with: 


Gonzalez Ingram MD [Primary Care Provider] -

## 2020-05-14 NOTE — DISCHARGE SUMMARY
Discharge Summary


Admit Date: 05/12/20


Discharge Date: 05/14/20


Discharging Provider: Rasta Last


Primary Care Provider: Dr. Ingram


Condition at Discharge: Serious


Discharge Disposition: 50 Hospice/Home DC/Xfer


Discharge Facility Name: home





- DIAGNOSES


Admission Diagnoses: 


(1) Altered mental status





(2) Hyperlipidemia





(3) Glaucoma





(4) GERD (gastroesophageal reflux disease)





(5) Depression





Discharge Diagnoses with Status of Each Condition: 


(1) Agitated


when pt's  was on the phone, she seems response and not agitated. But 

when her  was not the phone, she refused anything, she said  everything 

to  "no, no." she pulled IV line, refuse to eat, take meds. pt has hx of 

advanced dementia. discussed with pt's  for the care plan, and consult 

with palliative. Her  chose hospice care for pt


(2) Advanced dementia


hx of advance dementia. followup hospice care





(3) Anorexia


pt refused to eat and drink. followup hospice care





(4) Chronic anemia


chronic





(5) HLD (hyperlipidemia)


Impression: 


chronic





(6) Glaucoma


Impression: 


chronic





(7) GERD (gastroesophageal reflux disease)


chronic 





(8)hospice care


pt pulled IV line, refuse to eat, take meds, and refused care from hospital. pt 

has hx of advanced dementia. discussed with pt's  for the care plan, and 

consult with palliative. Her  chose hospice care for pt





- HPI


History of Present Illness: 


refer from Dr. Brice's HPI on 5.12.2020


Patient is an 80-year-old female who has had bilateral amputee due to peripheral

vascular disease.  Her other medical history to include hyperlipidemia, asthma, 

mild dementia, GERD, anxiety, glaucoma, iron deficiency anemia.She presented to 

the ED via EMS with altered mental status.  


As a result of her presentation this history is limited.


The history was provided by her  over the phone who explains that around 

midday the patient appeared to be drifting away and was incoherent.  She went to

the bathroom but then seemed to have forgotten what to do once she was done 

using the restroom.  Prior to this she was functioning at her baseline.  

Normally she is able to feed herself.  Yesterday she was conversing on the phone

with the children in [place] and in [place] who had called to wish her 

happy Mother's Day.  She has caregivers who come to the house on Mondays through

Fridays for 4 hours daily from 8 AM to noon.  They help her with showering.  

This was done today morning.  After that the patient had toast and coffee.


Work-up in the ED has been unremarkable.  This included a CT of the brain 

without contrast, CBC, BMP and UA. The patient's toxicology wasPositive for 

benzodiazepine.  The patient is on alprazolam, baclofen,Mirtazapine, Lyrica and 

Divalproex.She only takes the baclofen a few times a month.  She took it 

yesterday.


Currently the patient only cries out to a painful stimuli.  She does not follow 

any commands.  However her vitals are stable.


As a result of her presentation she is being admitted for observation overnight.








- CONSULTS | PROCEDURES


Consultations: palliative and hospice care


Procedures: 


d/c with hospice care in pt's home








- HOSPITAL COURSE


Hospital Course: 


pt was admitted for AMS altered mental status. In the hospital, pt pulled IV 

line, refuse to eat, take meds, and refused care from hospital.when pt's 

was on the phone, she seems response and not agitated. But when her  was 

not the phone, she refused anything. palliative care was consulted. pt's 

chose hospice care for pt. detail hospital course is as the below





(1) Agitated


when pt's  was on the phone, she seems response and not agitated. But 

when her  was not the phone, she refused anything, she said  everything 

to  "no, no." she pulled IV line, refuse to eat, take meds. pt has hx of 

advanced dementia. discussed with pt's  for the care plan, and consult 

with palliative. Her  chose hospice care for pt


(2) Advanced dementia


hx of advance dementia. followup hospice care





(3) Anorexia


pt declined to eat and drink. followup hospice care





(4) Chronic anemia


chronic





(5) HLD (hyperlipidemia)


Impression: 


chronic





(6) Glaucoma


Impression: 


chronic





(7) GERD (gastroesophageal reflux disease)


chronic 





(8)hospice care


pt pulled IV line, refuse to eat, take meds, and refused care from hospital. pt 

has hx of advanced dementia. discussed with pt's  for the care plan, and 

consult with palliative. Her  chose hospice care for pt








- ALLERGIES


Allergies/Adverse Reactions: 


                                    Allergies











Allergy/AdvReac Type Severity Reaction Status Date / Time


 


gentamicin [Gentamicin] Allergy  Unknown Verified 12/13/19 12:48


 


lisinopril Allergy  Unknown Verified 12/13/19 12:48


 


paroxetine [From Paxil] Allergy  Unknown Verified 12/17/19 07:33














- MEDICATIONS


Home Medications: 


                                Ambulatory Orders











 Medication  Instructions  Recorded  Confirmed


 


ALPRAZolam [Alprazolam] 0.25 mg PO Q4HR PRN MDD 4 DOSES OF 05/17/16 05/12/20





 0.25MG  


 


Divalproex Sodium 125 mg PO BID 10/12/19 05/12/20


 


Esomeprazole Magnesium 40 mg PO QDAC 10/12/19 05/12/20


 


Lovastatin 80 mg PO QPM 10/12/19 05/12/20


 


Mirtazapine 7.5 mg PO QPM 10/12/19 05/12/20


 


Aspirin [Aspirin EC] 81 mg PO DAILY 10/13/19 05/12/20


 


Pregabalin 50 mg PO BID 10/13/19 05/12/20


 


Cholecalciferol (Vitamin D3) 1,000 unit PO DAILY 03/23/20 05/12/20





[Vitamin D3]   


 


Ferrous Sulfate 325 mg PO DAILY 03/23/20 05/12/20


 


Dorzolamide HCl/Timolol Maleat 1 drops EACHEYE BID 05/12/20 05/12/20





[Dorzolamide-Timolol Eye Drops]   


 


LORazepam [Ativan] 0.5 mg PO Q6H PRN #15 tablet 05/14/20 


 


Morphine Sulfate [Morphine Sulf 5 mg PO Q4H PRN #30 ml 05/14/20 





Oral (Roxanol)]   














- PHYSICAL EXAM AT DISCHARGE


General Appearance: positive: No acute distress, Alert.  negative: Lethargic


Eyes Bilateral: positive: Normal inspection, No lid inflammation


ENT: positive: ENT inspection nml, No signs of dehydration.  negative: Purulent 

nasal drainage


Neck: positive: Nml inspection, Thyroid nml, Trachea midline.  negative: 

Thyromegaly, Stiff neck, Tracheal deviation


Respiratory: positive: No respiratory distress, Other (pt refused to have 

assessment)


Cardiovascular: positive: Other (pt refused to have assessment)


Abdomen: positive: Other (pt refused to have assessment)


Back: positive: Other (pt refused to have assessment)


Skin: positive: Color nml, Dry, Other (pt refused to have assessment)


Extremities: positive: Other (BKA below knee amputation bilateral )


Neurologic/Psychiatric: negative: Weakness, Facial droop, Slurred/abnml speech





- LABS


Result Diagrams: 


                                 05/14/20 05:30





                                 05/14/20 05:30





- FOLLOW UP


Follow Up: 


followup hospice care in Friday at her home








- TIME SPENT


Time Spent in Discharge (Minutes): 30

## 2020-05-18 ENCOUNTER — HOSPITAL ENCOUNTER (OUTPATIENT)
Dept: HOSPITAL 76 - LAB.R | Age: 80
Discharge: HOME | End: 2020-05-18
Attending: PHYSICIAN ASSISTANT
Payer: MEDICARE

## 2020-05-18 DIAGNOSIS — N39.0: Primary | ICD-10-CM

## 2020-05-18 LAB
CLARITY UR REFRACT.AUTO: (no result)
GLUCOSE UR QL STRIP.AUTO: NEGATIVE MG/DL
KETONES UR QL STRIP.AUTO: NEGATIVE MG/DL
NITRITE UR QL STRIP.AUTO: NEGATIVE
PH UR STRIP.AUTO: 5.5 PH (ref 5–7.5)
PROT UR STRIP.AUTO-MCNC: NEGATIVE MG/DL
RBC # UR STRIP.AUTO: NEGATIVE /UL
RBC # URNS HPF: (no result) /HPF (ref 0–5)
SP GR UR STRIP.AUTO: 1.02 (ref 1–1.03)
SQUAMOUS URNS QL MICRO: (no result)
UROBILINOGEN UR QL STRIP.AUTO: (no result) E.U./DL
UROBILINOGEN UR STRIP.AUTO-MCNC: NEGATIVE MG/DL
YEAST URNS QL MICRO: PRESENT

## 2020-05-18 PROCEDURE — 81001 URINALYSIS AUTO W/SCOPE: CPT

## 2020-05-21 ENCOUNTER — HOSPITAL ENCOUNTER (OUTPATIENT)
Dept: HOSPITAL 76 - PC | Age: 80
Discharge: HOME | End: 2020-05-21
Attending: NURSE PRACTITIONER
Payer: MEDICARE

## 2020-05-21 DIAGNOSIS — Z89.512: ICD-10-CM

## 2020-05-21 DIAGNOSIS — Z66: ICD-10-CM

## 2020-05-21 DIAGNOSIS — Z89.511: ICD-10-CM

## 2020-05-21 DIAGNOSIS — Z79.899: ICD-10-CM

## 2020-05-21 DIAGNOSIS — I73.9: ICD-10-CM

## 2020-05-21 DIAGNOSIS — L89.152: ICD-10-CM

## 2020-05-21 DIAGNOSIS — Z79.82: ICD-10-CM

## 2020-05-21 DIAGNOSIS — F03.90: ICD-10-CM

## 2020-05-21 DIAGNOSIS — R41.82: ICD-10-CM

## 2020-05-21 DIAGNOSIS — G89.29: ICD-10-CM

## 2020-05-21 DIAGNOSIS — I25.10: ICD-10-CM

## 2020-05-21 DIAGNOSIS — Z51.5: Primary | ICD-10-CM

## 2020-05-21 DIAGNOSIS — Z99.3: ICD-10-CM

## 2020-05-21 DIAGNOSIS — K08.89: ICD-10-CM

## 2020-05-21 PROCEDURE — 99350 HOME/RES VST EST HIGH MDM 60: CPT

## 2020-05-21 NOTE — CONSULTATION NOTE
Palliative Care Follow Up





- Referral


Referring Provider: Dr. Gonzalez Ingram


Time of Visit: 8569-9234


Referral setting: Home


Referral Reason: Vascular Dementia/Advanced Care Planning





- Information Sources


Records reviewed: Previous records reviewed


History/Review of Systems obtained from: Patient, Family (spouse, Geoff)


Exam limitations: Clinical condition





- History of Present Illness Update


Brief HPI Update: 





This is an 80-year-old -American female who was seen and evaluated in her

home today with her /D JIA Tellez present at the bedside.  She has a 

significant history for peripheral vascular disease and is status post bilateral

below the knee amputations as well as vascular dementia and major depressive 

disorder.





The patient was recently hospitalized for altered mental status from May 11 to 

May 14.  Her  reports that the day prior on Mother's Day she had been 

doing quite well and they had called multiple family members and even went for a

car ride.  The following day he reports that she was more lethargic which 

persisted throughout the day causing him to ultimately contact 911.  She had a 

work-up that included a CT scan of the brain without any acute findings, her 

labs were essentially normal, and her UA was negative for urinary tract 

infection.  While hospitalized she was also tested for COVID-19 and influenza, 

both of which were negative.  Her blood cultures after 5 days did not have any 

growth.  Given the patient's decline it was recommended that the patient be di

scharged back into the care of her spouse/D POA given she would likely benefit 

from her home environment and was discharged on hospice services.  When hospice 

came to admit the patient she relayed that she did not want end of life care and

therefore was not admitted on to hospice services and she wished to continue on 

palliative care services.





Since the patient's discharge from the hospital she has been more fatigued 

during the day and it has been difficult for the /D POA to motivate the 

patient on doing tasks. She used to watch family feud and price is right but is 

now opting to sleep.   She is consuming most of her meals at about 75% in 

consumption.  Her last weight when she saw her PCP yesterday was 120 pounds.  

There has been no additional changes to the patient's baseline medications and 

her /D JIA Tellez has set aside her comfort medications of lorazepam and 

morphine sulfate that were ordered upon discharge from the hospital as they wish

to avoid opioids.





The patient has a longstanding history of phantom limb pain.  She was previously

on long-term opioid therapy but her primary care provider was able to taper her 

off of these medications much to the pleasure of both the patient and her 

spouse.  She had been on MS Contin, OxyContin, and fentanyl.  Her  is 

leary of opiods given the patient's past history. She typically will have 

baclofen once or twice amonth for spams.  She is also maintained presently on 

Lyrica 50 mg twice daily.





She has developed some skin breakdown to her coccyx and her PCP has ordered home

health nursing for management.  The patient's spouse/D POA has been trying to 

encourage the patient to reposition in bed.





The patient has a past medical history of vascular dementia, peripheral vascular

disease, status post bilateral below the knee amputation,Hyperlipidemia, 

coronary artery disease, asthma, GERD, hiatal hernia, chronic vision loss, left 

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

elitis of the right foot in 2006, essential thrombocythemia TA mutation, 

frequent UTIs.





Social History





- Living Situation


Living arrangement: At home


Living Situation: With spouse/s.o.


Support System: 





The patient grew up in [place], [place].  Prior to relocating to [facility] they lived in [place].  The patient and her spouse have been  for

59 years.  They have 2 children, 1 daughter and 1 son.  They are estranged from 

their daughter who lives locally.  Their son, Zac resides in [place].  The 

patient's healthcare power of  is her , Geoff [phone]. 





The patient has 2 private caregivers through [facility] that come 5 days a week.  

The remaining times of the day and week, the spouse is the patient's sole 

caregiver. 





Medications/Allergies





- Medications


Home Medications: 


                                Ambulatory Orders











 Medication  Instructions  Recorded  Confirmed


 


ALPRAZolam [Alprazolam] 0.25 mg PO Q4HR PRN MDD 4 DOSES OF 05/17/16 05/12/20





 0.25MG  


 


Divalproex Sodium 125 mg PO BID 10/12/19 05/12/20


 


Esomeprazole Magnesium 40 mg PO QDAC 10/12/19 05/12/20


 


Lovastatin 80 mg PO QPM 10/12/19 05/12/20


 


Mirtazapine 7.5 mg PO QPM 10/12/19 05/12/20


 


Aspirin [Aspirin EC] 81 mg PO DAILY 10/13/19 05/12/20


 


Pregabalin 50 mg PO BID 10/13/19 05/12/20


 


Cholecalciferol (Vitamin D3) 1,000 unit PO DAILY 03/23/20 05/12/20





[Vitamin D3]   


 


Ferrous Sulfate 325 mg PO DAILY 03/23/20 05/12/20


 


Dorzolamide HCl/Timolol Maleat 1 drops EACHEYE BID 05/12/20 05/12/20





[Dorzolamide-Timolol Eye Drops]   


 


LORazepam [Ativan] 0.5 mg PO Q6H PRN #15 tablet 05/14/20 


 


Morphine Sulfate [Morphine Sulf 5 mg PO Q4H PRN #30 ml 05/14/20 





Oral (Roxanol)]   














- Allergies


Allergies/Adverse Reactions: 


                                    Allergies











Allergy/AdvReac Type Severity Reaction Status Date / Time


 


gentamicin [Gentamicin] Allergy  Unknown Verified 12/13/19 12:48


 


lisinopril Allergy  Unknown Verified 12/13/19 12:48


 


paroxetine [From Paxil] Allergy  Unknown Verified 12/17/19 07:33














Review of Systems





- Constitutional


Constitutional: reports: Fatigue, Weight stable (weight 120lb).  denies: Fever





- Eyes


Eyes: reports: Vision loss





- Ears, Nose & Throat


Ears, Nose & Throat: reports: Dental pain (left lower tooth pain--scheduled to 

see dentist 6/10/2020--has pain with mastication).  denies: Hearing loss





- Cardiovascular


Cardiovascular: denies: Chest pain





- Respiratory


Respiratory: denies: Cough





- Gastrointestinal


Gastrointestinal: denies: Abdominal pain, Constipation, Diarrhea, Vomiting





- Genitourinary


Genitourinary: reports: Incontinence (intermittent incontinence).  denies: 

Dysuria, Frequency





- Musculoskeletal


Musculoskeletal: reports: Muscle weakness, Assistive devices.  denies: Joint 

pain





- Integumentary


Integumentary: reports: Other (skin breakdown to coccyx)





- Neurological


Neurological: reports: General weakness, Memory problems





- Psychiatric


Psychiatric: reports: Depression, Behavior disturbances





- Endocrine


Endocrine: reports: Diabetes type 2, Hypothyroidism





- Hematologic/Lymphatic


Hematologic/Lymphatic: reports: Recurrent infections (UTI last treated March 2020)





- All Other Systems


All Other Systems: reports: Reviewed and negative





Physical Exam





- Vital Signs


Temperature: 36.5 C


Pulse Rate: 80


O2 Saturation: 93 (on RA at rest)


Blood Pressure: 101/80 (left wrist cuff)





- Physical Exam


General Appearance: positive: No acute distress, Alert, Other (resting in bed 

with nightgown on--dozing while ARNP spoke to spouse and would awaken when 

questioned. Was more alert after toileting and partipated more thoroughly in 

conversation. Thin, elderly woman)


Eyes Bilateral: positive: Normal inspection


ENT: positive: No signs of dehydration, Other (+endentulous; broken teeth; 

tender to palpation along jaw line at bottom of tooth on mid-left jaw without 

erythema to left lower gum or evidence of abscess)


Neck: positive: No JVD


Cardiovascular: positive: Regular rate & rhythm, No murmur


Respiratory: positive: No respiratory distress, Breath sounds nml.  negative: 

Rales


Abdomen: positive: Non-tender, Soft, Nml bowel sounds.  negative: Distended


Skin: positive: Pressure wound (dime size barely stage 2 pressure ulcer to 

coccyx without surrounding erythema or discharge (spouse had bandaid covering 

site that was removed))


Extremities: positive: Other (bilateral below the knee amputation with skin to 

stumps intact without breakdown)


Neurologic/Psychiatric: positive: Mood/affect nml (calm and relaxed during 

visit), Disoriented to place, Disoriented to time, Other (Required assistance to

have her leg prosthesis put on to stand and pivot to bedside commode)





Palliative Care





- POLST


Patient has POLST: Yes


POLST Status: DNR, Selective Treatment


Pain: No pain


Drowsiness/Sedation: Moderate (4-6)


Depression: None


Sleep: Sleeps well


Constipation: No, Managed


Performance Status: 





Patient is essentially homebound and leaves the home for car rides or doctor 

visits.  She is dependent on her  and private caregivers for her 

care.Patient has a Rollator, wheelchair, transport wheelchair and bedside 

commode available for her use.  Her  assists her with getting her 

bilateral lower leg prosthetics on.  No recent history of falls.








- Palliative Care


Discussion: 





Discussed with the patient and spouse/D POA that there was no underlying 

etiology for her altered mental status.  Things that would be correctable such 

as a urinary tract infection was ruled out.  Her  is happy to have the 

patient back home but wishes that she was sleeping less during the day and would

like to get up and participate more.  During hospitalization the patient was not

cooperative with care until she spoke to her  over the phone and would 

refuse medications. It was also stressful for her spouse during hospitalization 

as he found it difficult to be updated regarding the patient's care over the 

phone as he could not be present due to coranvirus restrictions as well as 

understanding the information he was relayed. The patient herself denies any 

feelings of depressive symptoms.  When asking the patient today what she 

believes is the cause for her daytime fatigue she replied "I do not know."  Her 

 feels that the patient is opting to stay more in bed due to a lack of 

confidence for fear of falling.  However, the patient has never experienced a 

significant fall.





The patient's /D POA is extremely attentive and supportive.  Since she 

has returned home despite sleeping more during the day she is almost quite back 

to her baseline.  When reviewing goals of care today moving forward, her 

became emotional and expressed that he is scared. He wishes to have the patient 

remain at home with him and does not want her to ever return to [facility].  The patient herself was quite vocal today in expressing that she would

not wish to return to the hospital if her health were to decline.  At the 

present time exploring this thread, if there were to be another acute status 

change in the patient's medical condition her spouse would opt to attempt 

correction of the underlying etiology at home and if no improvement then would 

consider a transition to hospice services. This approach would be done on a 

case-by-case basis.





The patient herself wishes to "get better" but due to her cognitive impairment 

due to her vascular dementia is unable to have insight into the chronic nature 

her multiple medical conditions. 





Results





- Lab Results


Lab results reviewed: Yes





Impression and Recommendations





- Palliative Care


Impression: 





This is an 80-year-old -American female who though is presently is quite 

fragile with a past medical history significant for peripheral vascular disease 

status post bilateral below the knee amputations, vascular dementia, chronic 

pain and major depressive disorder. She was recently hospitalized for altered 

mental status with unknown etiology but has essentially returned almost to her 

baseline since returning home.  The patient and her spouse/DPOA wish to manage 

symptoms as they arise within the home with the avoidance of hospitalization so 

that they may remain together.  Palliative care to continue to provide support 

with symptom management, anticipatory guidance, and exploration of goals of c

are.


Recommendations/Counseling Done: 





1. Altered mental status. Resolved and patient is almost at her baseline since 

returning home outside of her being chronically fatigued. Work-up during 

hospitalization 5/11-5/14/2020 found no underlying etiology such as infection 

(blood cultures neg, UA neg, CT of head neg). Potentially patient has had a 

progression of her vascular dementia and the changes that the spouse has been 

atuned to could be that the patient has experienced another slight decline in 

her vascular dementia journey. Due to her history of chronic pain and her 

's concerns regarding Opioid would avoid use. 





2. Dental pain to left lower tooth. No evidence of abscess and patient is 

afrebile. Continue to cut up patient's food to decrease mastication due to 

discomfort and her endentulous state. Use of acetaminophen PRN for pain. Follow-

up with dentist for further evaluation and management as scheduled. 





3. Stage II Pressure Ulcer, coccyx. Encouraged patient to turn and reposition in

bed every 2 hours. Recommended use of zinc oxide barrier cream to be applied BID

until healed. Home Health nursing initiation pending and was referred by PCP. 





4. Chronic pain. Multifactorial in origin with a history of long-term opioid 

therapy that is no longer in use.  History of phantom pain due to bilateral 

below the knee amputation secondary to peripheral vascular disease.  Symptoms of

chronic pain and phantom pain are controlled with Lyrica as prescribed.  In an 

acute episode symptoms are controlled with Tylenol and as needed baclofen.





5. Vascular dementia with a history of peripheral vascular disease.  Chronic.  

Progressive.  Fall precautions.  


Continue secondary preventative measures with statin and ASA therapy as 

prescribed.  Continue supportive care.  Continue Depakote as ordered due to 

history of agitation. Comfort medications MSIR and lorazepam in the home that 

may be available to for use in a future event when a transition to hospice 

services occurs reviewed with spouse and to refrain from use at this time with 

understanding verbalized. 





6. Advance care planning.  POLST in place DNAR with limited interventions. The 

patient and her spouse/DPOA are connected to each other and her spouse is 

fearful of losing her. At this present time, the patient and her  would 

not wish for her to return to the hospital if an event occurs . Her  is 

hoping for the best, but plans if a change were to occur in the patient's health

he wishes for her to remain within the home with him with additional support 

from hospice to be explored at that time if interventions within the home were 

not effective in reversal of any underlying health state.  Spouse wishes for 

patient to remain comfortable. Supportive listening provided to spouse. 


Time Spent: 








Total time spent 60 minutes with greater than 50% of this spent in counseling 

and coordination of care with patient and spouse, review of hospitalization, 

supportive listening, disease progression of dementia, review of symptom 

management and anticipatory guidance.





Disclaimer: The chart note was formulated using voice recognition technology and

unfortunately sound alike errors may occur.

## 2020-06-25 ENCOUNTER — HOSPITAL ENCOUNTER (OUTPATIENT)
Dept: HOSPITAL 76 - PC | Age: 80
Discharge: HOME | End: 2020-06-25
Attending: NURSE PRACTITIONER
Payer: MEDICARE

## 2020-06-25 DIAGNOSIS — G54.6: ICD-10-CM

## 2020-06-25 DIAGNOSIS — R53.1: ICD-10-CM

## 2020-06-25 DIAGNOSIS — Z87.440: ICD-10-CM

## 2020-06-25 DIAGNOSIS — L89.159: ICD-10-CM

## 2020-06-25 DIAGNOSIS — K04.7: ICD-10-CM

## 2020-06-25 DIAGNOSIS — Z79.899: ICD-10-CM

## 2020-06-25 DIAGNOSIS — Z89.511: ICD-10-CM

## 2020-06-25 DIAGNOSIS — I73.9: ICD-10-CM

## 2020-06-25 DIAGNOSIS — Z66: ICD-10-CM

## 2020-06-25 DIAGNOSIS — Z51.5: Primary | ICD-10-CM

## 2020-06-25 DIAGNOSIS — Z89.512: ICD-10-CM

## 2020-06-25 DIAGNOSIS — F03.90: ICD-10-CM

## 2020-06-25 DIAGNOSIS — Z79.82: ICD-10-CM

## 2020-06-25 PROCEDURE — 99349 HOME/RES VST EST MOD MDM 40: CPT

## 2020-06-25 NOTE — CONSULTATION NOTE
Palliative Care Follow Up





- Referral


Referring Provider: Dr. Gonzalez Ingram


Time of Visit: 6869-5107


Referral setting: Home


Referral Reason: Vascular Dementia/Skin breakdown





- Information Sources


Records reviewed: Previous records reviewed


History/Review of Systems obtained from: Patient, Family (, Paul 

present)


Exam limitations: Clinical condition (Advanced dementia)





- History of Present Illness Update


Brief HPI Update: 





This is an 80-year-old -American female who was seen and evaluated in her

room today with her /D JIA Tellez present at the bedside.  She has a 

significant history for peripheral vascular disease and is status post bilateral

below the knee amputations as well as vascular dementia and major depressive 

disorder.





Since last evaluation due to her development of skin breakdown to her coccyx her

PCP had ordered home health nursing for management.  The patient is being 

followed by home health nursing.


The  reports improvement.  Presently receiving A&D ointment to the site 

and leaving open to air. There is no open area to the coccyx and sacral region.





Last week the patient was seen by her oral surgeon and had 2 teeth pulled due to

development of an abscess.  She is presently on a course of amoxicillin with 1 

pill remaining.  Her  has been providing soft foods.  No reports of 

diarrhea or abdominal pain.The patient reports improvement to her dental 

discomfort.  The dentist had made recommendations for follow-up in regards to 

potential partial bridges to be made but it is unclear given the patient's 

underlying jaw formation if this can be possible per the 's report.





The patient was hospitalized May 11 to May 14 for altered mental status with no 

underlying cause found.  Since returning home the patient is starting to make 

some improvements per the 's report.  She is consuming most of her meals.

 At the present time the  is providing soft foods due to her recent 

dental procedure.  No coughing noted during meals.  She is also doing a bit more

for herself by sitting on the edge of the bed and even putting on her orthotics.

 Prior to more recently she was requiring increased assistance.  She has not 

been outside recently and continues to remain in her bedroom.





The patient has a longstanding history of phantom limb pain.  She was previously

on long-term opioid therapy but her primary care provider was able to taper off 

these medications which was to the pleasure both the patient and her spouse.  

She has been on MS Contin, OxyContin, and fentanyl.  Her  is leery of 

opioids given the patient's past history.  She does have PRN baclofen to use for

spasms but this is very sporadic.  She is maintained with Lyrica 50 mg twice 

daily.  The patient presently denies any joint or limb discomfort.





The patient has a past medical history of vascular dementia, peripheral vascular

disease, status post bilateral below the knee amputation, hyperlipidemia, 

coronary artery disease, asthma, GERD, hiatal hernia, chronic vision loss, left 

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

omyelitis of the right foot in 2006, essential thrombocythemia AT mutation, 

frequent UTIs





Social History





- Living Situation


Living arrangement: At home


Living Situation: With spouse/s.o.


Support System: 





The patient grew up in [place], [place].  Prior to relocating to [facility] she and her  lived in [place].  The patient and her spouse have 

been  for 59 years.  They have 2 children, 1 daughter and 1 son.  They 

are estranged from their daughter who lives locally.  Their son Zac resides in

[place].  The patient's healthcare power of  is her , Geoff 

[phone].





The patient has 2 private caregivers through [facility] that comes 5 days a week.

 On the weekends the patient's sole caregiver is her .  She used to sing 

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





Medications/Allergies





- Medications


Home Medications: 


                                Ambulatory Orders











 Medication  Instructions  Recorded  Confirmed


 


ALPRAZolam [Alprazolam] 0.25 mg PO Q4HR PRN MDD 4 DOSES OF 05/17/16 05/12/20





 0.25MG  


 


Divalproex Sodium 125 mg PO BID 10/12/19 05/12/20


 


Esomeprazole Magnesium 40 mg PO QDAC 10/12/19 05/12/20


 


Lovastatin 80 mg PO QPM 10/12/19 05/12/20


 


Mirtazapine 7.5 mg PO QPM 10/12/19 05/12/20


 


Aspirin [Aspirin EC] 81 mg PO DAILY 10/13/19 05/12/20


 


Pregabalin 50 mg PO BID 10/13/19 05/12/20


 


Cholecalciferol (Vitamin D3) 1,000 unit PO DAILY 03/23/20 05/12/20





[Vitamin D3]   


 


Ferrous Sulfate 325 mg PO DAILY 03/23/20 05/12/20


 


Dorzolamide HCl/Timolol Maleat 1 drops EACHEYE BID 05/12/20 05/12/20





[Dorzolamide-Timolol Eye Drops]   


 


LORazepam [Ativan] 0.5 mg PO Q6H PRN #15 tablet 05/14/20 


 


Morphine Sulfate [Morphine Sulf 5 mg PO Q4H PRN #30 ml 05/14/20 





Oral (Roxanol)]   














- Allergies


Allergies/Adverse Reactions: 


                                    Allergies











Allergy/AdvReac Type Severity Reaction Status Date / Time


 


gentamicin [Gentamicin] Allergy  Unknown Verified 12/13/19 12:48


 


lisinopril Allergy  Unknown Verified 12/13/19 12:48


 


paroxetine [From Paxil] Allergy  Unknown Verified 12/17/19 07:33














Review of Systems





- Constitutional


Constitutional: reports: Fatigue, Weight stable.  denies: Fever





- Eyes


Eyes: reports: Vision loss





- Ears, Nose & Throat


Ears, Nose & Throat: denies: Dental pain (see HPI), Dry mouth





- Cardiovascular


Cardiovascular: denies: Chest pain





- Respiratory


Respiratory: denies: Cough





- Gastrointestinal


Gastrointestinal: reports: Good appetite.  denies: Abdominal pain, Constipation,

Diarrhea, Vomiting





- Genitourinary


Genitourinary: denies: Dysuria





- Musculoskeletal


Musculoskeletal: reports: Muscle weakness, Assistive devices.  denies: Joint 

pain





- Integumentary


Integumentary: reports: Other (resolving skin breakdown to coccyx see HPI)





- Neurological


Neurological: reports: General weakness, Memory problems





- Psychiatric


Psychiatric: reports: Depression





- Hematologic/Lymphatic


Hematologic/Lymphatic: reports: Recurrent infections (UTI last treated March 2020), Other





- All Other Systems


All Other Systems: reports: Reviewed and negative





Physical Exam





- Vital Signs


Temperature: 36.3 C


Pulse Rate: 70


O2 Saturation: 94 (on RA at rest)


Blood Pressure: 100/69 (left wrist cuff lying down)





- Physical Exam


General Appearance: positive: No acute distress, Alert, Other (resting in bed 

with nightgown; more alert during evaluation today and did not intermittently 

doze as has on past evaluations; well groomed)


Eyes Bilateral: positive: Normal inspection


ENT: positive: No signs of dehydration


Neck: positive: Trachea midline


Cardiovascular: positive: Regular rate & rhythm, No murmur


Respiratory: positive: No respiratory distress, Breath sounds nml


Abdomen: positive: Non-tender, Soft, Nml bowel sounds


Skin: positive: Other (Healed skin to coccyx without erythema or evidence of 

skin breakdown)


Extremities: positive: No pedal edema, Other (bilateral below the knee 

amputation with skin to stumps intact without breakdown)


Neurologic/Psychiatric: positive: Mood/affect nml (calm without behavioral 

disturbances noted), Disoriented to place, Disoriented to time





Palliative Care





- POLST


Patient has POLST: Yes


POLST Status: DNR, Selective Treatment


Pain: No pain


Sleep: Sleeps well


Constipation: No, Managed


Performance Status: 





Patient is essentially homebound and leaves her home for her doctors visits.  

She is dependent on her  in private caregivers for her care for ADLs.  

She has a Rollator, wheelchair, transport wheelchair and bedside commode 

available for her use.  No recent history of falls.  Able to self feed.





- Palliative Care


Discussion: 





Per the 's report the patient has had steady improvement in her strength 

and engagement since returning home from the hospital and May 2020.  She has had

resolution of her dental pain after 2 teeth were removed due to a dental abscess

and is almost done completing her antibiotic course.





The skin breakdown noted to her C OCC YX has resolved and the patient's  

sees again steady improvement.





The 's greatest fear at the present time is concerning himself.  He has 

just completed chemotherapy and is scheduled to have a biopsy at [facility] in 

the next week.  He expresses concern that if something were to happen to himself

he is worried what will be available for the patient.  He is quite adamant that 

he would never want the patient to return to [facility].  He would want 

her to return to a nursing facility in the area such as [place] or 

[place].  Or there is the availability of their son in [place] overseeing 

her care.  The patient's son has previously reported that he would "take care of

mom."  The patient's spouse is optimistic regarding his response to his 

treatment and is quite dedicated to his wife.





Impression and Recommendations





- Palliative Care


Impression: 





This is an 80-year-old -American female who is quite fragile and at risk 

for Future complications due to her fragile state and multiple comorbidities 

more specifically peripheral vascular disease with status post bilateral below 

the knee amputations, vascular dementia, and chronic pain.  The skin breakdown 

to her C8 OCC YX has resolved.  She is returning to her baseline functional 

status.  The patient and her spouse wish to remain together.  Palliative care to

continue provide support for symptom management, anticipatory guidance and 

exploration of goals of care.


Recommendations/Counseling Done: 





1. Dental abscess with pain.  Resolved status post dental visit with 2 teeth 

extraction.  Almost completed antibiotic course without reported GI side 

effects.  Follow-up with dentist as needed.





2.  Pressure ulcer, Coccyx.  Followed by home health nursing.  Resolved.  

Continue to encourage repositioning while in bed.





3. Vascular dementia with a history of peripheral vascular disease.  Chronic.  

Progressive.  Fall precautions.  No reports of coughing with meals.  Continue 

secondary preventive measures with statin and aspirin therapy as prescribed.  

Continue Depakote as ordered due to history of agitation.  Patient does have 

comfort medications MSIR and lorazepam in the home that may be available for 

future symptom management as a situation arises.  Given the patient's advanced 

age a chronic decline is expected.





4.  Chronic pain.  Multifactorial in origin with a long term opioid therapy 

history which is no longer in use.  Phantom leg pain due to bilateral below the 

knee amputation secondary to peripheral vascular disease.  Presently symptoms 

are controlled with Lyrica.  In an acute episode of symptoms her symptoms are 

managed with Tylenol and baclofen if needed.


Time Spent: 





F/u phone call in 6-8 weeks for status update or evaluate sooner if 

new/worsening symptoms and spouse aware to contact Palliative Care for concerns.







CPT 26408





POC reviewed with patient and spouse with understanding veberalized, questions 

addressed and agreement with POC. 





Disclaimer: The chart note was formulated using voice recognition technology and

unfortunately sound alike errors may occur.

## 2020-08-28 ENCOUNTER — HOSPITAL ENCOUNTER (OUTPATIENT)
Dept: HOSPITAL 76 - EMS | Age: 80
Discharge: ANOTHER HOSPITAL | End: 2020-08-28
Attending: SURGERY
Payer: MEDICARE

## 2020-08-28 ENCOUNTER — HOSPITAL ENCOUNTER (OUTPATIENT)
Dept: HOSPITAL 76 - EMS | Age: 80
Discharge: HOME | End: 2020-08-28
Attending: SURGERY
Payer: MEDICARE

## 2020-08-28 ENCOUNTER — HOSPITAL ENCOUNTER (EMERGENCY)
Dept: HOSPITAL 76 - ED | Age: 80
Discharge: HOME | End: 2020-08-28
Payer: MEDICARE

## 2020-08-28 VITALS — SYSTOLIC BLOOD PRESSURE: 147 MMHG | DIASTOLIC BLOOD PRESSURE: 83 MMHG

## 2020-08-28 DIAGNOSIS — Z89.511: ICD-10-CM

## 2020-08-28 DIAGNOSIS — R53.1: Primary | ICD-10-CM

## 2020-08-28 DIAGNOSIS — R41.0: Primary | ICD-10-CM

## 2020-08-28 DIAGNOSIS — Z79.82: ICD-10-CM

## 2020-08-28 DIAGNOSIS — Z74.01: ICD-10-CM

## 2020-08-28 DIAGNOSIS — F03.90: ICD-10-CM

## 2020-08-28 DIAGNOSIS — R41.82: ICD-10-CM

## 2020-08-28 DIAGNOSIS — R41.0: ICD-10-CM

## 2020-08-28 DIAGNOSIS — R53.83: Primary | ICD-10-CM

## 2020-08-28 DIAGNOSIS — Z89.512: ICD-10-CM

## 2020-08-28 LAB
ALBUMIN DIAFP-MCNC: 3.4 G/DL (ref 3.2–5.5)
ALBUMIN/GLOB SERPL: 0.7 {RATIO} (ref 1–2.2)
ALP SERPL-CCNC: 91 IU/L (ref 42–121)
ALT SERPL W P-5'-P-CCNC: < 10 IU/L (ref 10–60)
ANION GAP SERPL CALCULATED.4IONS-SCNC: 9 MMOL/L (ref 6–13)
AST SERPL W P-5'-P-CCNC: 20 IU/L (ref 10–42)
BASOPHILS # BLD MANUAL: 0 10^3/UL (ref 0–0.1)
BASOPHILS NFR BLD AUTO: 0.3 %
BILIRUB BLD-MCNC: 0.4 MG/DL (ref 0.2–1)
BUN SERPL-MCNC: 10 MG/DL (ref 6–20)
CALCIUM UR-MCNC: 9.5 MG/DL (ref 8.5–10.3)
CHLORIDE SERPL-SCNC: 107 MMOL/L (ref 101–111)
CLARITY UR REFRACT.AUTO: CLEAR
CO2 SERPL-SCNC: 23 MMOL/L (ref 21–32)
CREAT SERPLBLD-SCNC: 0.9 MG/DL (ref 0.4–1)
EOSINOPHIL # BLD MANUAL: 0.1 10^3/UL (ref 0–0.7)
EOSINOPHIL NFR BLD AUTO: 0.6 %
ERYTHROCYTE [DISTWIDTH] IN BLOOD BY AUTOMATED COUNT: 19.9 % (ref 12–15)
GLOBULIN SER-MCNC: 5.2 G/DL (ref 2.1–4.2)
GLUCOSE SERPL-MCNC: 77 MG/DL (ref 70–100)
GLUCOSE UR QL STRIP.AUTO: NEGATIVE MG/DL
HGB UR QL STRIP: 11.4 G/DL (ref 12–16)
INR PPP: 1.2 (ref 0.8–1.2)
KETONES UR QL STRIP.AUTO: NEGATIVE MG/DL
LIPASE SERPL-CCNC: 38 U/L (ref 22–51)
LYMPH ABN NFR BLD MANUAL: 0 %
LYMPHOBLASTS # BLD: 38 %
LYMPHOCYTES # BLD MANUAL: 2.7 10^3/UL (ref 1.5–3.5)
LYMPHOCYTES NFR BLD AUTO: 39 %
MANUAL DIF COMMENT BLD-IMP: (no result)
MCH RBC QN AUTO: 28.1 PG (ref 27–31)
MCHC RBC AUTO-ENTMCNC: 30.6 G/DL (ref 32–36)
MCV RBC AUTO: 91.6 FL (ref 81–99)
MONOCYTES # BLD MANUAL: 0.5 10^3/UL (ref 0–1)
MONOCYTES NFR BLD AUTO: 11 %
NEUTROPHILS # SNV AUTO: 7 X10^3/UL (ref 4.8–10.8)
NEUTROPHILS NFR BLD AUTO: 48.7 %
NEUTS BAND NFR BLD: 0 %
NITRITE UR QL STRIP.AUTO: NEGATIVE
PDW BLD AUTO: 11.8 FL (ref 7.9–10.8)
PH UR STRIP.AUTO: 6 PH (ref 5–7.5)
PLAT MORPH BLD: (no result)
PLATELET # BLD: 653 10^3/UL (ref 130–450)
PLATELET BLD QL SMEAR: (no result)
PROT SPEC-MCNC: 8.6 G/DL (ref 6.7–8.2)
PROT UR STRIP.AUTO-MCNC: NEGATIVE MG/DL
PROTHROM ACT/NOR PPP: 13.2 SECS (ref 9.9–12.6)
RBC # UR STRIP.AUTO: NEGATIVE /UL
RBC MAR: 4.06 10^6/UL (ref 4.2–5.4)
RBC MORPH BLD: (no result)
SODIUM SERPLBLD-SCNC: 139 MMOL/L (ref 135–145)
SP GR UR STRIP.AUTO: <=1.005 (ref 1–1.03)
UROBILINOGEN UR QL STRIP.AUTO: (no result) E.U./DL
UROBILINOGEN UR STRIP.AUTO-MCNC: NEGATIVE MG/DL

## 2020-08-28 PROCEDURE — 87086 URINE CULTURE/COLONY COUNT: CPT

## 2020-08-28 PROCEDURE — 81001 URINALYSIS AUTO W/SCOPE: CPT

## 2020-08-28 PROCEDURE — 81003 URINALYSIS AUTO W/O SCOPE: CPT

## 2020-08-28 PROCEDURE — 84443 ASSAY THYROID STIM HORMONE: CPT

## 2020-08-28 PROCEDURE — 85025 COMPLETE CBC W/AUTO DIFF WBC: CPT

## 2020-08-28 PROCEDURE — 99284 EMERGENCY DEPT VISIT MOD MDM: CPT

## 2020-08-28 PROCEDURE — 85610 PROTHROMBIN TIME: CPT

## 2020-08-28 PROCEDURE — 93005 ELECTROCARDIOGRAM TRACING: CPT

## 2020-08-28 PROCEDURE — 80053 COMPREHEN METABOLIC PANEL: CPT

## 2020-08-28 PROCEDURE — 83690 ASSAY OF LIPASE: CPT

## 2020-08-28 PROCEDURE — 70450 CT HEAD/BRAIN W/O DYE: CPT

## 2020-08-28 PROCEDURE — 36415 COLL VENOUS BLD VENIPUNCTURE: CPT

## 2020-08-28 NOTE — ED PHYSICIAN DOCUMENTATION
History of Present Illness





- Stated complaint


Stated Complaint: WEAKNESS





- Chief complaint


Chief Complaint: General





- History obtained from


History obtained from: Patient, Family, EMS





- Additonal information


Additional information: 


Patient sent to the emergency department via EMS by  and caregiver after 

being found to be more confused than usual at home.   states that the 

patient does not know his name today and that she refused food, fluid, or 

medications.  The patient states that nothing is bothering her right now.  She 

denies any pain.  No nausea.  No dysuria.  The patient is cared for at home by 

her  and a morning caregiver on weekdays, and by just her  on the 

weekends.  The  has not noticed any signs of illness recently and states 

the patient seemed normal when she woke up this morning.  He states his symptoms

started around lunchtime, though the patient did refuse her medications in the 

morning.  The patient is currently on palliative care and has a nurse 

practitioner who makes home visits on an as-needed basis; however, the nurse 

practitioner was not available to be reached today.  No other complaints at this

time.








Review of Systems


Unable to obtain: Dementia





PD PAST MEDICAL HISTORY





- Past Medical History


Cardiovascular: High cholesterol, Coronary artery disease, Peripheral Vascular 

Disease


Respiratory: Asthma


Neuro: Dementia


Endocrine/Autoimmune: None


GI: GERD, Hiatal hernia


GYN: Fibroids


: None


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


Psych: Depression, Anxiety


Musculoskeletal: Osteoporosis


Derm: None





- Past Surgical History


Past Surgical History: Yes


General: Appendectomy, Splenectomy


Ortho: Amputation


/GYN: Hysterectomy


Cardiovascular: Angioplasty, Other





- Present Medications


Home Medications: 


                                Ambulatory Orders











 Medication  Instructions  Recorded  Confirmed


 


ALPRAZolam [Alprazolam] 0.25 mg PO Q4HR PRN MDD 4 DOSES OF 05/17/16 05/12/20





 0.25MG  


 


Divalproex Sodium 125 mg PO BID 10/12/19 05/12/20


 


Esomeprazole Magnesium 40 mg PO QDAC 10/12/19 05/12/20


 


Lovastatin 80 mg PO QPM 10/12/19 05/12/20


 


Mirtazapine 7.5 mg PO QPM 10/12/19 05/12/20


 


Aspirin [Aspirin EC] 81 mg PO DAILY 10/13/19 05/12/20


 


Pregabalin 50 mg PO BID 10/13/19 05/12/20


 


Cholecalciferol (Vitamin D3) 1,000 unit PO DAILY 03/23/20 05/12/20





[Vitamin D3]   


 


Ferrous Sulfate 325 mg PO DAILY 03/23/20 05/12/20


 


Dorzolamide HCl/Timolol Maleat 1 drops EACHEYE BID 05/12/20 05/12/20





[Dorzolamide-Timolol Eye Drops]   


 


LORazepam [Ativan] 0.5 mg PO Q6H PRN #15 tablet 05/14/20 


 


Morphine Sulfate [Morphine Sulf 5 mg PO Q4H PRN #30 ml 05/14/20 





Oral (Roxanol)]   














- Allergies


Allergies/Adverse Reactions: 


                                    Allergies











Allergy/AdvReac Type Severity Reaction Status Date / Time


 


gentamicin [Gentamicin] Allergy  Unknown Verified 12/13/19 12:48


 


lisinopril Allergy  Unknown Verified 12/13/19 12:48


 


paroxetine [From Paxil] Allergy  Unknown Verified 12/17/19 07:33














- Social History


Does the pt smoke?: No


Smoking Status: Unknown if ever smoked


Does the pt drink ETOH?: No


Does the pt have substance abuse?: No





- Immunizations


Immunizations are current?: Yes





- POLST


Patient has POLST: Yes


POLST Status: DNR





PD ED PE NORMAL





- Vitals


Vital signs reviewed: Yes





- General


General: No acute distress, Well developed/nourished, Other (She is alert, and 

oriented to self.  No distress)





- HEENT


HEENT: Atraumatic, PERRL, EOMI, Moist mucous membranes





- Neck


Neck: Supple, no meningeal sign





- Cardiac


Cardiac: RRR, No murmur, Strong equal pulses





- Respiratory


Respiratory: No respiratory distress, Clear bilaterally





- Abdomen


Abdomen: Soft, Non tender, Non distended





- Back


Back: No CVA TTP





- Derm


Derm: Normal color, Warm and dry, No rash





- Extremities


Extremities: No edema, No calf tenderness / cord, Other (Bilateral BKA's.)





- Neuro


Neuro: Other (Patient is alert and answers questions with slight delay.  

Oriented to self.  Calls  "vikas", but does not know his name.No gross 

deficits otherwise.)





- Psych


Psych: Normal mood, Normal affect





Results





- Vitals


Vitals: 


                                     Oxygen











O2 Source [Without Activity]   Room air


 


O2 Source                      Room air

















- EKG (time done)


  ** 1115


Rate: Rate (enter#) (74)


Rhythm: NSR


Axis: Normal


Intervals: Normal OH


QRS: Normal


Ischemia: Normal ST segments, Non specific changes (borderline T abnormalities, 

inferior leads)


Other comments: Other comments (No acute ischemia; Some baseline wander.)


Compare to prior EKG: Old EKG unavailable


Computer interpretation: Agree with computer





- Labs


Labs: 


                                Laboratory Tests











  08/28/20 08/28/20 08/28/20





  11:40 11:40 11:40


 


WBC  7.0  


 


RBC  4.06 L  


 


Hgb  11.4 L  


 


Hct  37.2  


 


MCV  91.6  


 


MCH  28.1  


 


MCHC  30.6 L  


 


RDW  19.9 H  


 


Plt Count  653 H  


 


MPV  11.8 H  


 


Neut # (Auto)  Not Reportable  


 


Lymph # (Auto)  Not Reportable  


 


Mono # (Auto)  Not Reportable  


 


Eos # (Auto)  Not Reportable  


 


Baso # (Auto)  Not Reportable  


 


Absolute Nucleated RBC  Not Reportable  


 


Total Counted  100  


 


Band Neuts % (Manual)  0  


 


Abnorm Lymph % (Manual)  0  


 


Nucleated RBC %  Not Reportable  


 


Neutrophils # (Manual)  3.8  


 


Lymphocytes # (Manual)  2.7  


 


Monocytes # (Manual)  0.5  


 


Eosinophils # (Manual)  0.1  


 


Basophils # (Manual)  0.0  


 


Nucleated RBCs  1  


 


Differential Comment  MANUAL DIFFERENTIAL  


 


WBC Morphology  1+ TOXIC GRANULATION  


 


Platelet Estimate  INCREASED (>450,000)  


 


Platelet Morphology  1+ GIANT PLATELETS  


 


RBC Morph Micro Appear  1+ SIERRA JOLLY BODY  


 


PT   13.2 H 


 


INR   1.2 


 


Sodium    139


 


Potassium    3.6


 


Chloride    107


 


Carbon Dioxide    23


 


Anion Gap    9.0


 


BUN    10


 


Creatinine    0.9


 


Estimated GFR (MDRD)    73 L


 


Glucose    77


 


Calcium    9.5


 


Total Bilirubin    0.4


 


AST    20


 


ALT    < 10 L


 


Alkaline Phosphatase    91


 


Total Protein    8.6 H


 


Albumin    3.4


 


Globulin    5.2 H


 


Albumin/Globulin Ratio    0.7 L


 


Lipase    38


 


TSH   


 


Urine Color   


 


Urine Clarity   


 


Urine pH   


 


Ur Specific Gravity   


 


Urine Protein   


 


Urine Glucose (UA)   


 


Urine Ketones   


 


Urine Occult Blood   


 


Urine Nitrite   


 


Urine Bilirubin   


 


Urine Urobilinogen   


 


Ur Leukocyte Esterase   


 


Ur Microscopic Review   


 


Urine Culture Comments   














  08/28/20 08/28/20





  11:40 13:00


 


WBC  


 


RBC  


 


Hgb  


 


Hct  


 


MCV  


 


MCH  


 


MCHC  


 


RDW  


 


Plt Count  


 


MPV  


 


Neut # (Auto)  


 


Lymph # (Auto)  


 


Mono # (Auto)  


 


Eos # (Auto)  


 


Baso # (Auto)  


 


Absolute Nucleated RBC  


 


Total Counted  


 


Band Neuts % (Manual)  


 


Abnorm Lymph % (Manual)  


 


Nucleated RBC %  


 


Neutrophils # (Manual)  


 


Lymphocytes # (Manual)  


 


Monocytes # (Manual)  


 


Eosinophils # (Manual)  


 


Basophils # (Manual)  


 


Nucleated RBCs  


 


Differential Comment  


 


WBC Morphology  


 


Platelet Estimate  


 


Platelet Morphology  


 


RBC Morph Micro Appear  


 


PT  


 


INR  


 


Sodium  


 


Potassium  


 


Chloride  


 


Carbon Dioxide  


 


Anion Gap  


 


BUN  


 


Creatinine  


 


Estimated GFR (MDRD)  


 


Glucose  


 


Calcium  


 


Total Bilirubin  


 


AST  


 


ALT  


 


Alkaline Phosphatase  


 


Total Protein  


 


Albumin  


 


Globulin  


 


Albumin/Globulin Ratio  


 


Lipase  


 


TSH  0.93 


 


Urine Color   YELLOW


 


Urine Clarity   CLEAR


 


Urine pH   6.0


 


Ur Specific Gravity   <=1.005


 


Urine Protein   NEGATIVE


 


Urine Glucose (UA)   NEGATIVE


 


Urine Ketones   NEGATIVE


 


Urine Occult Blood   NEGATIVE


 


Urine Nitrite   NEGATIVE


 


Urine Bilirubin   NEGATIVE


 


Urine Urobilinogen   0.2 (NORMAL)


 


Ur Leukocyte Esterase   NEGATIVE


 


Ur Microscopic Review   NOT INDICATED


 


Urine Culture Comments   NOT INDICATED














- Rads (name of study)


  ** CT head


Radiology: Final report received, EMP read indepedently, See rad report (NAD)





PD MEDICAL DECISION MAKING





- ED course


Complexity details: reviewed old records, reviewed results, re-evaluated 

patient, considered differential, d/w patient, d/w family


ED course: 


The patient was worked up extensively for her increased confusion as reported by

the .  Work-up, including labs, UA, and CT scan of the head were all 

unremarkable for acute findings.  The patient initially was sent in via EMS and 

no family came to the emergency department for some hours, until it was decided 

that the patient would be discharged home.  When  came, he expressed that

he was concerned because patient does not know his name and that is unusual for 

her.  I had a long discussion with him regarding the patient's dementia and that

some degree of fluctuation is to be expected, with an overall downward 

trajectory.  I did explain to him that we had not found any emergent cause for 

the patient's confusion.  Patient's  was concerned that he would not be 

able to get the patient to take her medications at home and that he did not have

a caregiver to assist him in the evening.  I did have the patient drink in the 

emergency department and she was willing to take a sip of liquid here there, in 

the form of juice.  I spoke with Dr. Santamaria, who is on-call for hospitalist 

service, and after reviewing patient's records, she stated that the patient has 

been through this a number of times and already admitted for the same thing back

in May.  The patient has had this kind of fluctuation in her course before, and 

Dr. Santamaria stated that she felt that this is simply a normal part of the 

patient's course and that there is no benefit to inpatient admission.  I 

discussed this with the .  We have hydrated the patient, after ultimately

getting an IV, as the patient has very difficult peripheral access.  The 

patient's mental status did not change one way or the other with hydration.  She

has remained stable here in the emergency department.  I have discussed with the

 that he may need to talk to the patient's  about having 

increased coverage from Home Health and Palliative Care Services.  We have also 

discussed respite care.  At this point, the  like to take the patient 

home, and she is stable for this.  We have discussed the usual indications for 

return.








Departure





- Departure


Disposition: 01 Home, Self Care


Clinical Impression: 


Altered mental status


Qualifiers:


 Altered mental status type: unspecified Qualified Code(s): R41.82 - Altered 

mental status, unspecified





Condition: Stable


Instructions:  ED Weakness UKO


Comments: 


The labs and urinalysis look good.  There is no evidence of an emergent 

condition at this time.  There is no evidence of a stroke on examination.  

Please be sure that Lita is getting plenty of water to drink and be sure she 

is staying hydrated, as dehydration is a common cause of weakness in elderly 

patients, especially those with dementia.  Please have her follow-up with her 

primary care physician if she does not seem to be improving over the next week.


Discharge Date/Time: 08/28/20 18:56

## 2020-08-28 NOTE — CT REPORT
PROCEDURE:  HEAD WO

 

INDICATIONS:  altered level of consciousness

 

TECHNIQUE:  

Noncontrast 4.5 mm thick angled axial sections acquired from the foramen magnum to the vertex.  For r
adiation dose reduction, the following was used:  automated exposure control, adjustment of mA and/or
 kV according to patient size.

 

COMPARISON:     

 

FINDINGS:  

Image quality:  Excellent.  

 

CSF spaces:  Basal cisterns are patent.  No extra-axial fluid collections.  The ventricles are symmet
марина in size and shape.  

 

Brain:  No intracranial bleeds or masses.  There is cerebral volume loss for age, with resultant vent
ricular and sulcal prominence.  There are periventricular and deep white matter chronic small vessel 
ischemic changes.  There is intracranial internal carotid artery and vertebral artery 8/31/2015 ather
osclerosis.  

 

Skull and face:  Calvarium and visualized facial bones appear intact, without suspicious lesions.  

 

Sinuses:  Visualized sinuses and mastoids are clear.  

 

 

IMPRESSION: 

 

No acute intracranial disease process.

 

Reviewed by: Shelley Chavez MD, PhD on 8/28/2020 12:00 PM PDT

Approved by: Shelley Chavez MD, PhD on 8/28/2020 12:00 PM PDT

 

 

Station ID:  [id number]

## 2020-11-10 ENCOUNTER — HOSPITAL ENCOUNTER (OUTPATIENT)
Dept: HOSPITAL 76 - PC | Age: 80
Discharge: HOME | End: 2020-11-10
Attending: NURSE PRACTITIONER
Payer: MEDICARE

## 2020-11-10 DIAGNOSIS — Z89.511: ICD-10-CM

## 2020-11-10 DIAGNOSIS — Z66: ICD-10-CM

## 2020-11-10 DIAGNOSIS — Z89.512: ICD-10-CM

## 2020-11-10 DIAGNOSIS — I73.9: ICD-10-CM

## 2020-11-10 DIAGNOSIS — L85.3: ICD-10-CM

## 2020-11-10 DIAGNOSIS — F01.50: ICD-10-CM

## 2020-11-10 DIAGNOSIS — Z51.5: Primary | ICD-10-CM

## 2020-11-10 DIAGNOSIS — G89.29: ICD-10-CM

## 2020-11-10 PROCEDURE — 99348 HOME/RES VST EST LOW MDM 30: CPT

## 2020-11-10 NOTE — CONSULTATION NOTE
Palliative Care Follow Up





- Referral


Referring Provider: Dr. Gonzalez Ingram


Time of Visit: Initated 1520


Referral setting: Home


Referral Reason: Vascular Dementia





- Information Sources


Records reviewed: Previous records reviewed


History/Review of Systems obtained from: Patient, Family (, Geoff)


Exam limitations: Clinical condition (Advanced Dementia)





- History of Present Illness Update


Brief HPI Update: 





This is an 80-year-old -American female who was seen and evaluated within

her home with her /JULIANN, Geoff present for routine follow-up due to her 

history of vascular dementia, major depressive disorder, and peripheral vascular

disease status post bilateral below the knee amputations.





The patient continues to report some discomfort with her mouth and previously 

had teeth pulled.  Per the 's report the dentist relayed that due to the 

patient's present state of her mouth she is not able to have partial bridges 

made.  The patient's  continues to provide soft foods for ease of mass 

dictation and the patient denies discomfort during meals.  She consumes 

breakfast, lunch, and dinner.  No visible weight loss reported by the patient's 

spouse.





She does have some local dryness to her lower extremities and is not want to 

request application of lotion by her private caregivers.





The patient is presently perseverating in regards to her right eye feeling like 

"sandpaper."  She is presently being followed by ophthalmology and has been 

prescribed and topical antibiotic drops as well as Refresh Tears that are being 

administered 4 times daily.  The patient's  reports that her complaints 

regarding her right eye haveImproved.  She has a repeat follow-up to have her 

right eye evaluated next week.





The patient will have complaints of intermittent headaches and for this, the 

patient's  will provide Tylenol administration with resolution of her 

complaints.





The patient has not had any recent falls.





The patient is seen sitting up in bed dressed in her own night robe with a hair 

covering.  Well-groomed no evidence of acute distress.  She will at times to for

answers regarding questions to her .


Past Medical History: 





Patient has a past medical history of vascular dementia, peripheral vascular 

disease, status post bilateral below the knee amputations, hyperlipidemia, 

coronary artery disease, asthma, GERD, hiatal hernia, chronic vision loss, left 

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 

osteomyelitis of the right foot in 2006, essential thrombocythemia JA K 

mutation, frequent UTIs.





Social History





- Living Situation


Living arrangement: At home


Living Situation: With spouse/s.o.


Support System: 





The patient and her  have been  for 59 years.  Prior to relocating

to [facility] she and her  lived in [place], [place].  She grew 

up in [place], [place].  The patient and her  have 2 children, 1 

daughter and 1 son.  The patient's healthcare power of  is her , 

Geoff at [phone].





The patient has 2 private caregivers through [facility] that come 5 days/week.  

Her  continues to be her primary caregiver.





Medications/Allergies





- Medications


Home Medications: 


                                Ambulatory Orders











 Medication  Instructions  Recorded  Confirmed


 


ALPRAZolam [Alprazolam] 0.25 mg PO Q4HR PRN MDD 4 DOSES OF 05/17/16 05/12/20





 0.25MG  


 


Divalproex Sodium 125 mg PO BID 10/12/19 05/12/20


 


Esomeprazole Magnesium 40 mg PO QDAC 10/12/19 05/12/20


 


Lovastatin 80 mg PO QPM 10/12/19 05/12/20


 


Mirtazapine 7.5 mg PO QPM 10/12/19 05/12/20


 


Aspirin [Aspirin EC] 81 mg PO DAILY 10/13/19 05/12/20


 


Pregabalin 50 mg PO BID 10/13/19 05/12/20


 


Cholecalciferol (Vitamin D3) 1,000 unit PO DAILY 03/23/20 05/12/20





[Vitamin D3]   


 


Ferrous Sulfate 325 mg PO DAILY 03/23/20 05/12/20


 


Dorzolamide HCl/Timolol Maleat 1 drops EACHEYE BID 05/12/20 05/12/20





[Dorzolamide-Timolol Eye Drops]   


 


LORazepam [Ativan] 0.5 mg PO Q6H PRN #15 tablet 05/14/20 


 


Morphine Sulfate [Morphine Sulf 5 mg PO Q4H PRN #30 ml 05/14/20 





Oral (Roxanol)]   


 


Neomycin/Poly/Dex Ophth Drops 1 RIGHTEYE MDD for 7 days 11/10/20 





[Maxitrol Ophth Drops]   


 


prednisoLONE 1% OPHTH DROPS [Pred 1 drops RIGHTEYE BID 11/10/20 11/10/20





Forte 1% Ophth Drops]   














- Allergies


Allergies/Adverse Reactions: 


                                    Allergies











Allergy/AdvReac Type Severity Reaction Status Date / Time


 


gentamicin [Gentamicin] Allergy  Unknown Verified 12/13/19 12:48


 


lisinopril Allergy  Unknown Verified 12/13/19 12:48


 


paroxetine [From Paxil] Allergy  Unknown Verified 12/17/19 07:33














Review of Systems





- Constitutional


Constitutional: denies: Fever, Weight loss





- Eyes


Eyes: reports: Irritation (right eye), Vision loss





- Ears, Nose & Throat


Ears, Nose & Throat: denies: Dry mouth





- Cardiovascular


Cardiovascular: denies: Chest pain





- Respiratory


Respiratory: denies: Cough





- Gastrointestinal


Gastrointestinal: reports: Good appetite.  denies: Constipation, Vomiting





- Genitourinary


Genitourinary: denies: Dysuria





- Musculoskeletal


Musculoskeletal: reports: Muscle weakness, Assistive devices.  denies: Joint 

pain





- Integumentary


Integumentary: reports: Dryness (LE)





- Neurological


Neurological: reports: General weakness, Memory problems





- Psychiatric


Psychiatric: reports: Depression (controlled)





- Endocrine


Endocrine: denies: Diabetes type 2





- Hematologic/Lymphatic


Hematologic/Lymph: Other (history of frequent UTIs)





- All Other Systems


All Other Systems: reports: Reviewed and negative (Patient is a poor historian 

due to dementia and review of systems supplemented by the patient's 

/Geoff HUMPHREYS.)





Physical Exam





- Vital Signs


Temperature: 36.6 C


Pulse Rate: 64


O2 Saturation: 95 (on RA)


Blood Pressure: 115/65 (left wrist cuff)





- Physical Exam


General Appearance: positive: No acute distress, Alert, Other (resting in bed in

robe with hair covering in place)


Eyes Bilateral: positive: Normal inspection (no evidence of discharge to either 

eye).  negative: No lid inflammation


ENT: positive: No signs of dehydration, Other (bony protrusion on hard palate 

consistent with  Torus Palatinus)


Neck: positive: Trachea midline


Cardiovascular: positive: Regular rate & rhythm, No murmur


Respiratory: positive: No respiratory distress, Breath sounds nml


Abdomen: positive: Non-tender, Soft, Nml bowel sounds.  negative: Guarding


Skin: positive: Dryness (BLE)


Extremities: positive: No pedal edema, Other (bilateral below the knee 

amputations with localized dry skin)


Neurologic/Psychiatric: positive: Disoriented to place, Disoriented to time, 

Other (calm and engaged with baseline affect. No behavioral disturbances noted.)





Palliative Care





- POLST


Patient has POLST: Yes


POLST Status: DNR, Selective Treatment


Pain: No pain (history of phantom limb pain and previous on opioids for 

management and was subsequently tappered off.)


Depression: None


Sleep: Sleeps well


Constipation: No


Performance Status: 





Patient is homebound only and released her home for doctor visits.  She is 

dependent on her private caregivers and her husbandAble to self feed.  Remains 

continent of bowel and bladder.  For ADLs.  She has a Rollator, wheelchair, 

transport wheelchair and bedside commode available for her use.





- Palliative Care


Discussion: 





The patient recently has been stable in regards to her cognitive and functional 

decline.  The only recent concern has been with her right eye and her complaints

of a feeling like "sandpaper."  Her discomfort with her right eye has gradually 

improved with ophthalmic drops applications and evaluations by ophthalmology.





The patient and her  offer no acute concerns in regards to her health 

further for today.  The patient's son, Zac, was conferenced in on the phone in

offers no additional concerns.





Results





- Lab Results


Lab results reviewed: Yes





Impression and Recommendations





- Palliative Care


Impression: 





This is an 80-year-old female who was quite fragile and at risk of future 

fracture locations due to her multiple comorbidities more specifically, 

peripheral vascular disease.  Her chronic pain and phantom leg pain are 

presently well controlled.  Palliative care to continue provide support for 

symptom management, anticipatory guidance, and advanced care planning.


Recommendations/Counseling Done: 





1.  Chronic pain.  Multifactorial in origin with long-term opioid therapy 

history which is no longer in use.  Presently controlled phantom leg pain due to

bilateral below the knee amputation secondary to peripheral vascular disease.  

Symptoms are presently controlled with Lyrica and local heating pad.  And an 

acute episode of symptoms she is typically managed with Tylenol and baclofen if 

needed.  Fall precautions.





2.Generalized dryness to bilateral lower extremities.  Encouraged the patient 

and her spouse to apply lotion to affected areas on a daily basis and after 

bathing to lock in moisture.  No evidence of skin breakdown.





3.  Vascular dementia with a history of peripheral vascular disease.  Chronic.  

Progressive.  Fall precautions.  No reports of dysphagia.  Continue secondary 

preventative measures with statin and aspirin therapy as prescribed.  Continue 

Depakote as ordered due to previous history of agitation.  Last valporic acid 

level 13.9 on 5/13/2020. Behaviors appear to be presently controlled.  The 

patient has comfort medications MSIR and lorazepam in the home that is available

for future symptom management if the situation arises.  Given the patient's 

advanced age and chronic comorbidities a gradual decline is expected.





4.  Advanced care planning.  SHAWANDA in ST in place as DN AR with limited 

interventions.  The patient spouse is presently undergoing treatment for his MDS

and has minimal symptom burden but her  is her primary caregiver.  The 

patient's  has been expressing his desire is to his son that both the 

patient and himself desire to remain on [facility]. Continue to provide 

support for the patient's spouse and their family member guarding advance care 

planning in preparation for avoidance of a crisis.  Supportive listening 

provided.


Time Spent: 





CPT 71920





Plan of care reviewed with patient and spouse with understanding verbalized, 

questions answered and addressed and in agreement with plan of care.





Request last office visit note from ophthalmologist, Dr. Terry Arroyo.





Disclaimer: The chart note was formulated using voice recognition technology and

unfortunately sound alike errors may occur.

## 2020-12-26 ENCOUNTER — HOSPITAL ENCOUNTER (OUTPATIENT)
Dept: HOSPITAL 76 - EMS | Age: 80
Discharge: ANOTHER HOSPITAL | End: 2020-12-26
Attending: SURGERY
Payer: MEDICARE

## 2020-12-26 ENCOUNTER — HOSPITAL ENCOUNTER (INPATIENT)
Dept: HOSPITAL 76 - ED | Age: 80
LOS: 6 days | Discharge: HOME HEALTH | DRG: 690 | End: 2021-01-01
Attending: INTERNAL MEDICINE | Admitting: INTERNAL MEDICINE
Payer: MEDICARE

## 2020-12-26 DIAGNOSIS — R41.82: Primary | ICD-10-CM

## 2020-12-26 DIAGNOSIS — Z66: ICD-10-CM

## 2020-12-26 DIAGNOSIS — M19.042: ICD-10-CM

## 2020-12-26 DIAGNOSIS — I73.9: ICD-10-CM

## 2020-12-26 DIAGNOSIS — Z89.511: ICD-10-CM

## 2020-12-26 DIAGNOSIS — N39.0: ICD-10-CM

## 2020-12-26 DIAGNOSIS — E61.2: ICD-10-CM

## 2020-12-26 DIAGNOSIS — F03.90: ICD-10-CM

## 2020-12-26 DIAGNOSIS — G93.40: ICD-10-CM

## 2020-12-26 DIAGNOSIS — I25.10: ICD-10-CM

## 2020-12-26 DIAGNOSIS — M19.041: ICD-10-CM

## 2020-12-26 DIAGNOSIS — E16.2: ICD-10-CM

## 2020-12-26 DIAGNOSIS — H54.61: ICD-10-CM

## 2020-12-26 DIAGNOSIS — G93.49: ICD-10-CM

## 2020-12-26 DIAGNOSIS — F17.210: ICD-10-CM

## 2020-12-26 DIAGNOSIS — Z89.512: ICD-10-CM

## 2020-12-26 DIAGNOSIS — H40.9: ICD-10-CM

## 2020-12-26 DIAGNOSIS — D47.3: ICD-10-CM

## 2020-12-26 DIAGNOSIS — B96.20: ICD-10-CM

## 2020-12-26 DIAGNOSIS — R62.7: ICD-10-CM

## 2020-12-26 DIAGNOSIS — R46.4: Primary | ICD-10-CM

## 2020-12-26 DIAGNOSIS — E46: ICD-10-CM

## 2020-12-26 DIAGNOSIS — Z20.828: ICD-10-CM

## 2020-12-26 DIAGNOSIS — E87.6: ICD-10-CM

## 2020-12-26 LAB
ALBUMIN DIAFP-MCNC: 3.4 G/DL (ref 3.2–5.5)
ALBUMIN/GLOB SERPL: 0.5 {RATIO} (ref 1–2.2)
ALP SERPL-CCNC: 101 IU/L (ref 42–121)
ALT SERPL W P-5'-P-CCNC: < 10 IU/L (ref 10–60)
AMPHET UR QL SCN: NEGATIVE
ANION GAP SERPL CALCULATED.4IONS-SCNC: 16 MMOL/L (ref 6–13)
AST SERPL W P-5'-P-CCNC: 22 IU/L (ref 10–42)
BASE EXCESS BLDV CALC-SCNC: -4.6 MMOL/L
BASOPHILS NFR BLD AUTO: 0 10^3/UL (ref 0–0.1)
BASOPHILS NFR BLD AUTO: 0.2 %
BENZODIAZ UR QL SCN: POSITIVE
BILIRUB BLD-MCNC: 0.6 MG/DL (ref 0.2–1)
BUN SERPL-MCNC: 9 MG/DL (ref 6–20)
C PNEUM DNA NPH QL NAA+NON-PROBE: NOT DETECTED
CALCIUM UR-MCNC: 9.9 MG/DL (ref 8.5–10.3)
CHLORIDE SERPL-SCNC: 106 MMOL/L (ref 101–111)
CO2 BLDV-SCNC: 21.8 MMOL/L (ref 24–29)
CO2 SERPL-SCNC: 22 MMOL/L (ref 21–32)
COCAINE UR-SCNC: NEGATIVE UMOL/L
CREAT SERPLBLD-SCNC: 1.1 MG/DL (ref 0.4–1)
EOSINOPHIL # BLD AUTO: 0 10^3/UL (ref 0–0.7)
EOSINOPHIL NFR BLD AUTO: 0.5 %
ERYTHROCYTE [DISTWIDTH] IN BLOOD BY AUTOMATED COUNT: 18.6 % (ref 12–15)
GLOBULIN SER-MCNC: 6.3 G/DL (ref 2.1–4.2)
GLUCOSE SERPL-MCNC: 86 MG/DL (ref 70–100)
HGB UR QL STRIP: 12.5 G/DL (ref 12–16)
LIPASE SERPL-CCNC: 37 U/L (ref 22–51)
LYMPHOCYTES # SPEC AUTO: 3 10^3/UL (ref 1.5–3.5)
LYMPHOCYTES NFR BLD AUTO: 34.9 %
MCH RBC QN AUTO: 27.1 PG (ref 27–31)
MCHC RBC AUTO-ENTMCNC: 30 G/DL (ref 32–36)
MCV RBC AUTO: 90.5 FL (ref 81–99)
METHADONE UR QL SCN: NEGATIVE
METHAMPHET UR QL SCN: NEGATIVE
MONOCYTES # BLD AUTO: 0.8 10^3/UL (ref 0–1)
MONOCYTES NFR BLD AUTO: 9.3 %
NEUTROPHILS # BLD AUTO: 4.6 10^3/UL (ref 1.5–6.6)
NEUTROPHILS # SNV AUTO: 8.5 X10^3/UL (ref 4.8–10.8)
NEUTROPHILS NFR BLD AUTO: 54.6 %
OPIATES UR QL SCN: NEGATIVE
PCO2 BLDV: 38.6 MMHG (ref 41–51)
PDW BLD AUTO: 12.9 FL (ref 7.9–10.8)
PH BLDV: 7.35 [PH] (ref 7.31–7.41)
PLATELET # BLD: 543 10^3/UL (ref 130–450)
PO2 BLDV: 22.3 MMHG (ref 25–47)
PROT SPEC-MCNC: 9.7 G/DL (ref 6.7–8.2)
RBC MAR: 4.61 10^6/UL (ref 4.2–5.4)
VOLATILE DRUGS POS SERPL SCN: (no result)

## 2020-12-26 PROCEDURE — 83605 ASSAY OF LACTIC ACID: CPT

## 2020-12-26 PROCEDURE — 85025 COMPLETE CBC W/AUTO DIFF WBC: CPT

## 2020-12-26 PROCEDURE — 99285 EMERGENCY DEPT VISIT HI MDM: CPT

## 2020-12-26 PROCEDURE — 80048 BASIC METABOLIC PNL TOTAL CA: CPT

## 2020-12-26 PROCEDURE — 74018 RADEX ABDOMEN 1 VIEW: CPT

## 2020-12-26 PROCEDURE — 93005 ELECTROCARDIOGRAM TRACING: CPT

## 2020-12-26 PROCEDURE — 87631 RESP VIRUS 3-5 TARGETS: CPT

## 2020-12-26 PROCEDURE — 96365 THER/PROPH/DIAG IV INF INIT: CPT

## 2020-12-26 PROCEDURE — 96375 TX/PRO/DX INJ NEW DRUG ADDON: CPT

## 2020-12-26 PROCEDURE — 82140 ASSAY OF AMMONIA: CPT

## 2020-12-26 PROCEDURE — 80306 DRUG TEST PRSMV INSTRMNT: CPT

## 2020-12-26 PROCEDURE — 36415 COLL VENOUS BLD VENIPUNCTURE: CPT

## 2020-12-26 PROCEDURE — 87181 SC STD AGAR DILUTION PER AGT: CPT

## 2020-12-26 PROCEDURE — 70450 CT HEAD/BRAIN W/O DYE: CPT

## 2020-12-26 PROCEDURE — 82803 BLOOD GASES ANY COMBINATION: CPT

## 2020-12-26 PROCEDURE — 82550 ASSAY OF CK (CPK): CPT

## 2020-12-26 PROCEDURE — 96361 HYDRATE IV INFUSION ADD-ON: CPT

## 2020-12-26 PROCEDURE — 81001 URINALYSIS AUTO W/SCOPE: CPT

## 2020-12-26 PROCEDURE — 36600 WITHDRAWAL OF ARTERIAL BLOOD: CPT

## 2020-12-26 PROCEDURE — 81003 URINALYSIS AUTO W/O SCOPE: CPT

## 2020-12-26 PROCEDURE — 96366 THER/PROPH/DIAG IV INF ADDON: CPT

## 2020-12-26 PROCEDURE — 80053 COMPREHEN METABOLIC PANEL: CPT

## 2020-12-26 PROCEDURE — 97161 PT EVAL LOW COMPLEX 20 MIN: CPT

## 2020-12-26 PROCEDURE — 97530 THERAPEUTIC ACTIVITIES: CPT

## 2020-12-26 PROCEDURE — 83690 ASSAY OF LIPASE: CPT

## 2020-12-26 PROCEDURE — 70551 MRI BRAIN STEM W/O DYE: CPT

## 2020-12-26 PROCEDURE — 0202U NFCT DS 22 TRGT SARS-COV-2: CPT

## 2020-12-26 PROCEDURE — 71045 X-RAY EXAM CHEST 1 VIEW: CPT

## 2020-12-26 PROCEDURE — 51701 INSERT BLADDER CATHETER: CPT

## 2020-12-26 PROCEDURE — 51798 US URINE CAPACITY MEASURE: CPT

## 2020-12-26 PROCEDURE — 83735 ASSAY OF MAGNESIUM: CPT

## 2020-12-26 PROCEDURE — 87086 URINE CULTURE/COLONY COUNT: CPT

## 2020-12-26 PROCEDURE — 85027 COMPLETE CBC AUTOMATED: CPT

## 2020-12-26 RX ADMIN — SODIUM CHLORIDE, PRESERVATIVE FREE SCH ML: 5 INJECTION INTRAVENOUS at 16:52

## 2020-12-26 RX ADMIN — DEXTROSE AND SODIUM CHLORIDE SCH MLS/HR: 5; 450 INJECTION, SOLUTION INTRAVENOUS at 16:32

## 2020-12-26 NOTE — PHARMACY PROGRESS NOTE
- Best Possible Medication History


Admit Date and Time: 12/26/20 1333


Processed by: Pharmacy


Medication History completed: Yes


Patient Interview: Completed


Secondary Source(s): Physician records, Insurance records





As the person ultimately responsible for medication therapy, providers are able 

to order a medication from an existing home medication list in [facility] via the 

"Reconcile Routine" prior to Confirmation of that medication by support staff. 

Such practice is discouraged except when the physician, in their clinical 

judgment, deems that a medical need exists for a medication without regard to 

previous use.

## 2020-12-26 NOTE — ED PHYSICIAN DOCUMENTATION
History of Present Illness





- Stated complaint


Stated Complaint: AMS





- History obtained from


History obtained from: Family (), EMS





- Additonal information


Additional information: 





80-year-old woman with past medical history of dementia, DNR/DNI with limited 

interventions, bilateral below-knee amputation presents with altered mental 

status, first noted by  at 3 AM this morning.  She was acting normal 

yesterday, talking on the phone with family and friends.  Upon his full 

awakening at 8 AM he noted that she was not speaking with her eyes closed and 

not moving her extremities.  On arrival to the ED she would say ouch when te

mperature was taken or she was moved but otherwise did not speak, eye-opening to

verbal, and moving her upper extremities and lower extremities.  She was 

hypoglycemic initially and dextrose was given.  Further history limited by 

patient altered mental status.





Review of Systems


Unable to obtain: AMS





PD PAST MEDICAL HISTORY





- Past Medical History


Cardiovascular: High cholesterol, Coronary artery disease, Peripheral Vascular 

Disease


Respiratory: Asthma


Neuro: Dementia


Endocrine/Autoimmune: None


GI: GERD, Hiatal hernia


GYN: Fibroids


: None


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


Psych: Depression, Anxiety


Musculoskeletal: Osteoporosis


Derm: None





- Past Surgical History


Past Surgical History: Yes


General: Appendectomy, Splenectomy


Ortho: Amputation


/GYN: Hysterectomy


Cardiovascular: Angioplasty, Other





- Present Medications


Home Medications: 


                                Ambulatory Orders











 Medication  Instructions  Recorded  Confirmed


 


ALPRAZolam [Alprazolam] 0.25 mg PO Q4HR PRN MDD 4 DOSES OF 05/17/16 05/12/20





 0.25MG  


 


Divalproex Sodium 125 mg PO BID 10/12/19 05/12/20


 


Esomeprazole Magnesium 40 mg PO QDAC 10/12/19 05/12/20


 


Lovastatin 80 mg PO QPM 10/12/19 05/12/20


 


Mirtazapine 7.5 mg PO QPM 10/12/19 05/12/20


 


Aspirin [Aspirin EC] 81 mg PO DAILY 10/13/19 05/12/20


 


Cholecalciferol (Vitamin D3) 1,000 unit PO DAILY 03/23/20 05/12/20





[Vitamin D3]   


 


Ferrous Sulfate 325 mg PO DAILY 03/23/20 05/12/20


 


Dorzolamide HCl/Timolol Maleat 1 drops EACHEYE BID 05/12/20 05/12/20





[Dorzolamide-Timolol Eye Drops]   


 


prednisoLONE 1% OPHTH DROPS [Pred 1 drops RIGHTEYE BID 11/10/20 11/10/20





Forte 1% Ophth Drops]   


 


Pregabalin [Lyrica] 50 mg PO TID 12/26/20 














- Allergies


Allergies/Adverse Reactions: 


                                    Allergies











Allergy/AdvReac Type Severity Reaction Status Date / Time


 


gentamicin [Gentamicin] Allergy  Unknown Verified 12/26/20 09:07


 


lisinopril Allergy  Unknown Verified 12/26/20 09:07


 


paroxetine [From Paxil] Allergy  Unknown Verified 12/26/20 09:07














- Social History


Does the pt smoke?: No


Smoking Status: Unknown if ever smoked


Does the pt drink ETOH?: No


Does the pt have substance abuse?: No





- Immunizations


Immunizations are current?: Yes





- POLST


Patient has POLST: Yes


POLST Status: DNR





PD ED PE NORMAL





- Vitals


Vital signs reviewed: Yes





- General


General: Other (Altered, confused)





- HEENT


HEENT: Atraumatic, Other (Right eye cataract with unequal pupil.  Left eye PE RR

L)





- Neck


Neck: Supple, no meningeal sign





- Cardiac


Cardiac: RRR





- Respiratory


Respiratory: No respiratory distress, Clear bilaterally





- Abdomen


Abdomen: Non tender, Non distended





- Female 


Female : Deferred





- Rectal


Rectal: Other (Wound stool)





- Back


Back: No spinal TTP





- Derm


Derm: Normal color, Warm and dry





- Extremities


Extremities: Other (Bilateral below-knee amputation.  Bilateral upper extremity 

contractured)





- Neuro


Neuro: Other (Unresponsive)


Eye Opening: To Voice


Motor: Withdraws to Pain


Verbal: Incomprehensible


GCS Score: 9





- Psych


Psych: Other (Altered mental status)





Results





- Vitals


Vitals: 


                               Vital Signs - 24 hr











  12/26/20 12/26/20 12/26/20





  09:00 09:42 10:12


 


Temperature 36.5 C  


 


Heart Rate 79 78 74


 


Respiratory 12 18 19





Rate   


 


Blood Pressure 157/100 H 124/105 H 143/102 H


 


O2 Saturation 92 99 98














  12/26/20 12/26/20 12/26/20





  10:14 10:30 11:00


 


Temperature 36.6 C  


 


Heart Rate 80 78 79


 


Respiratory 13 22 21





Rate   


 


Blood Pressure 143/102 H 132/108 H 130/99 H


 


O2 Saturation 100 99 98














  12/26/20 12/26/20 12/26/20





  11:30 12:00 12:30


 


Temperature   37.2 C


 


Heart Rate 80 79 87


 


Respiratory 20 18 19





Rate   


 


Blood Pressure 142/100 H 161/119 H 155/99 H


 


O2 Saturation 99 94 96














  12/26/20 12/26/20





  13:00 13:30


 


Temperature  


 


Heart Rate 72 70


 


Respiratory 20 18





Rate  


 


Blood Pressure 156/92 H 151/79 H


 


O2 Saturation 98 96








                                     Oxygen











O2 Source [Without Activity]   Room air


 


O2 Source                      Room air

















- Labs


Labs: 


                                Laboratory Tests











  12/26/20 12/26/20 12/26/20





  12:22 12:22 12:47


 


WBC  8.5  


 


RBC  4.61  


 


Hgb  12.5  


 


Hct  41.7  


 


MCV  90.5  


 


MCH  27.1  


 


MCHC  30.0 L  


 


RDW  18.6 H  


 


Plt Count  543 H  


 


MPV  12.9 H  


 


Neut # (Auto)  4.6  


 


Lymph # (Auto)  3.0  


 


Mono # (Auto)  0.8  


 


Eos # (Auto)  0.0  


 


Baso # (Auto)  0.0  


 


Absolute Nucleated RBC  0.09  


 


Nucleated RBC %  1.1  


 


Sodium   144 


 


Potassium   3.6 


 


Chloride   106 


 


Carbon Dioxide   22 


 


Anion Gap   16.0 H 


 


BUN   9 


 


Creatinine   1.1 H 


 


Estimated GFR (MDRD)   58 L 


 


Glucose   86 


 


Lactic Acid    1.8


 


Calcium   9.9 


 


Total Bilirubin   0.6 


 


AST   22 


 


ALT   < 10 L 


 


Alkaline Phosphatase   101 


 


Total Protein   9.7 H 


 


Albumin   3.4 


 


Globulin   6.3 H 


 


Albumin/Globulin Ratio   0.5 L 


 


Lipase   37 














PD MEDICAL DECISION MAKING





- ED course


ED course: 





9am- ems report That patient was last seen normal with GCS 15 a and O x3 last 

night prior to going to bed.  Has been noticed at 2 AM that she was not speaking

when he woke up to go to the restroom.  Then at 8 AM she did not have 

improvement so he called 911.  On arrival EMS found that her fingerstick was 70s

so they gave D5 with some improvement in her mental status.  On arrival to the 

ED she is opening her eyes to her name but not speaking.  Moving extremities but

not on command.  Per EMS report she said ouch when her IV was started. Code 

stroke not called given she is outside the window however we will obtain a CT 

and CTA.  I do note that she had similar ams a/w hypoglycemic episode in August 

of this year with return to baseline after sugar improved.





On review of records and after d/w nursing who spoke with the  en route 

it was noted she is dnr/dni with limited interventions and not a thrombectomy 

candidate. with this in mind we will cancel her cta for now pending discussion 

with . hypoglycemia most likely culprit of her ams. 





Departure





- Departure


Disposition: 66 [facility] DC/Xfer


Clinical Impression: 


Altered mental status


Qualifiers:


 Altered mental status type: unspecified Qualified Code(s): R41.82 - Altered 

mental status, unspecified





Condition: Stable

## 2020-12-26 NOTE — XRAY REPORT
PROCEDURE:  Chest 1 View X-Ray

 

INDICATIONS:  Chest Pain

 

TECHNIQUE:  One view of the chest was acquired.  

 

COMPARISON:  5/11/2020, 10/13/2019, 10/12/2019

 

FINDINGS:  

 

Surgical changes and devices: Left upper quadrant postoperative clips are seen. Left proximal arm sof
t tissue clips are seen.

 

Lungs and pleura:  No pleural effusions or pneumothorax.  Lungs are clear.  

 

Mediastinum:  The aorta is prominent and tortuous. The cardiac contours are within normal limits.   

 

Bones and chest wall: Age-appropriate degenerative changes are seen.   No suspicious bony lesions.  O
verlying soft tissues appear unremarkable.  

 

 

 

 

 

IMPRESSION:  

 

Clear lungs.

 

Prominent, tortuous aorta.

 

Postoperative and degenerative changes are seen.  

 

Reviewed by: Kasi Savage MD on 12/26/2020 8:34 AM [facility]

Approved by: Kasi Savage MD on 12/26/2020 8:34 AM [facility]

 

 

Station ID:  [id number]

## 2020-12-26 NOTE — HISTORY & PHYSICAL EXAMINATION
Chief Complaint





- Chief Complaint


Chief Complaint: Altered mental status





History of Present Illness





- Admitted From


Admitted From:: Home





- History Obtained From


Records Reviewed: Yes


History obtained from: ER Physician, Spouse, EMR


Exam Limitations: Patient is altered and unable to provide a history.





- History of Present Illness


HPI Comment/Other: 


This is a 80-year-old female with a past medical history significant for dem

entia, GERD, peripheral vascular disease, neuropathy who presents today after 

being found altered this morning by her .  The history is obtained from 

the patient's spouse as she is altered and unable to provide a history.  He 

states that she was in her usual state of health yesterday evening and went to 

sleep feeling fine.  He states that at baseline she is oriented to self, 

location.  She will occasionally know the year and month.  She knew that 

yesterday was Th[place].  She recognizes all of her family members.  He states 

that she is normally relatively independent and will use her prosthesis and a 

walker to get around.  He administers all of her medications to her.  He states 

that yesterday evening he gave her baclofen which she takes as needed for 

neuropathy given her history of bilateral below the knee amputations.  He states

that this morning he woke up and found her to be minimally responsive.  He 

attempted to get her to the bedside commode but realized that would require more

than 1 person and that she was not talking or verbalizing.  He was concerned 

that she was having a stroke and so he called EMS.  He states she has not been 

sick recently she is not been complaining of any fevers or chills or abdominal 

pain.  He states she has vascular dementia.  He reports no prior history of 

strokes.  He states she never had seizures.  She has not had a similar 

presentation in the past.





In the emergency department, she was found to be minimally responsive but able 

to protect her airway.  She was found to have contractures of her bilateral 

upper extremities.  She underwent a CT of the head which did not show any acute 

abnormalities.  Her labs were also unremarkable.  Given her ongoing altered 

status, medicine was consulted for admission.





I did discuss goals of care with the patient spouse and he confirms that she is 

a DNR.





History





- Past Medical History


Cardiovascular: reports: High cholesterol, Coronary artery disease, Peripheral 

Vascular Disease


Respiratory: reports: Asthma


Neuro: reports: Dementia


Endocrine/Autoimmune: reports: None


GI: reports: GERD, Hiatal hernia


GYN: reports: Fibroids


: reports: None


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


Psych: reports: Depression, Anxiety


Musculoskeletal: reports: Osteoporosis


Derm: reports: None


MRSA Hx?: No





- Past Surgical History


General: reports: Appendectomy, Splenectomy


Ortho: reports: Amputation


/GYN: reports: Hysterectomy


Cardiovascular: reports: Angioplasty, Other





- Family & Social History


Family History: Mother: , Father: , CAD


Living Situation: With spouse/s.o.


Social History Notes: She lives with her  in [place]. They have been 

 for 58 years. She had a 1ppd X 15 yrs now down to 1 or 2 cigarettes 

daily. The patient denies any alcohol or illicit drug use





- Substance History


Use: Uses substance without health or social issues: NONE (Former smoker until 

she was admitted to [facility] in .)





- POLST


Patient has POLST: Yes


POLST Status: DNR





Meds/Allgy





- Home Medications


Home Medications: 


                                Ambulatory Orders











 Medication  Instructions  Recorded  Confirmed


 


ALPRAZolam [Alprazolam] 0.25 mg PO Q4HR PRN MDD 4 DOSES OF 16





 0.25MG  


 


Divalproex Sodium 125 mg PO BID 10/12/19 05/12/20


 


Esomeprazole Magnesium 40 mg PO QDAC 10/12/19 05/12/20


 


Lovastatin 80 mg PO QPM 10/12/19 05/12/20


 


Mirtazapine 7.5 mg PO QPM 10/12/19 05/12/20


 


Aspirin [Aspirin EC] 81 mg PO DAILY 10/13/19 05/12/20


 


Cholecalciferol (Vitamin D3) 1,000 unit PO DAILY 20





[Vitamin D3]   


 


Ferrous Sulfate 325 mg PO DAILY 20


 


Dorzolamide HCl/Timolol Maleat 1 drops EACHEYE BID 20





[Dorzolamide-Timolol Eye Drops]   


 


prednisoLONE 1% OPHTH DROPS [Pred 1 drops RIGHTEYE BID 11/10/20 11/10/20





Forte 1% Ophth Drops]   


 


Pregabalin [Lyrica] 50 mg PO TID 20 














- Allergies


Allergies/Adverse Reactions: 


                                    Allergies











Allergy/AdvReac Type Severity Reaction Status Date / Time


 


gentamicin [Gentamicin] Allergy  Unknown Verified 20 09:07


 


lisinopril Allergy  Unknown Verified 20 09:07


 


paroxetine [From Paxil] Allergy  Unknown Verified 20 09:07














Review of Systems





- All Other Systems


All Other Systems: reports: Other (Unable to obtain due to altered mental 

status.)


Prior Level of Functionality: 


She lives at home with her .  She has 2 prosthesis for her bilateral 

below-knee amputation.  She ambulates with a walker at baseline.  Her  

administers her medications.





Exam





- Vital Signs


Reviewed Vital Signs: Yes


Vital Signs: 





                                Vital Signs x48h











  Temp Pulse Resp BP Pulse Ox


 


 20 14:00   88  19  127/103 H  98


 


 20 13:30   70  18  151/79 H  96


 


 20 13:00   72  20  156/92 H  98


 


 20 12:30  37.2 C  87  19  155/99 H  96


 


 20 12:00   79  18  161/119 H  94


 


 20 11:30   80  20  142/100 H  99


 


 20 11:00   79  21  130/99 H  98


 


 20 10:30   78  22  132/108 H  99


 


 20 10:14  36.6 C  80  13  143/102 H  100


 


 20 10:12   74  19  143/102 H  98


 


 20 09:42   78  18  124/105 H  99


 


 20 09:00  36.5 C  79  12  157/100 H  92














- Physical Exam


General Appearance: positive: Other (She is lying in bed and appears 

comfortable.  She will respond to painful stimuli with grunting.  Will not open 

her eyes to command.)


Eyes Bilateral: positive: Other (Pupils are reactive to light.  Dilated at 5 

mm.)


ENT: positive: ENT inspection nml


Neck: positive: Nml inspection


Respiratory: positive: No respiratory distress.  negative: Wheezes, Rales


Cardiovascular: positive: Regular rate & rhythm.  negative: Tachycardia, 

Systolic murmur


Abdomen: positive: Non-tender, No distention.  negative: Tenderness


Extremities: positive: Other (Bilateral below the knee amputations.)


Neurologic/Psychiatric: positive: Other (She is quite rigid in her bilateral 

upper extremities.  I can passively extend her right upper extremity at the 

elbow but she will not let me extend her left elbow.  Unable to assess for 

reflexes.)





Conclusion/Plan





- Problem List


(1) Encephalopathy


Conclusion/Plan: 


The etiology of her encephalopathy is not clear.  CT of the head did not reveal 

any acute abnormalities.  She has no fever and her labs are unremarkable.  Low 

suspicion for meningitis at this time.  This could potentially be catatonia.  

Other differentials include stroke versus seizure.  At this time, we will give 

her a one-time dose of lorazepam IV for possible catatonia.  Will obtain MRI of 

the brain.  Check ammonia.  Will check CK level as well.  Neurochecks.  If no 

improvement then we will proceed with lumbar puncture.








(2) Dementia


Conclusion/Plan: 


She has a history of vascular dementia but at baseline, she is relatively 

independent.  We will treat her acute encephalopathy as mentioned above.








(3) History of below-knee amputation of both lower extremities


Conclusion/Plan: 


Stable.  We will resume her home neuropathy medications when she is able to take

p.o.








(4) Essential thrombocythemia


Conclusion/Plan: 


Stable.  Her platelet counts are at her baseline.








- Lab Results


Lab results reviewed: Yes


Fish Bones: 


                                 20 12:22





                                 20 12:22





- Diagnostic Imaging Results


Diagnostic Imaging Results: positive: Final report reviewed





Core Measures





- Anticipated LOS


I expect patient to be DC'd or transferred within 96 hours.: Yes





- Issues


Hospital Issues and Management Plan: 


80-year-old female presents with acute onset of altered mental status.  Concern 

is for possible stroke, catatonia, seizure.  Will obtain MRI.  Continue further 

work-up of altered mental status.





- DVT/VTE - Prophylaxis


VTE/DVT Device ordered at admit?: Yes


VTE/DVT Prophylaxis med ordered at admit?: Yes

## 2020-12-27 LAB
ANION GAP SERPL CALCULATED.4IONS-SCNC: 8 MMOL/L (ref 6–13)
BASOPHILS NFR BLD AUTO: 0 10^3/UL (ref 0–0.1)
BASOPHILS NFR BLD AUTO: 0.3 %
BUN SERPL-MCNC: 6 MG/DL (ref 6–20)
CALCIUM UR-MCNC: 9.5 MG/DL (ref 8.5–10.3)
CHLORIDE SERPL-SCNC: 109 MMOL/L (ref 101–111)
CLARITY UR REFRACT.AUTO: CLEAR
CO2 SERPL-SCNC: 23 MMOL/L (ref 21–32)
CREAT SERPLBLD-SCNC: 0.8 MG/DL (ref 0.4–1)
EOSINOPHIL # BLD AUTO: 0.2 10^3/UL (ref 0–0.7)
EOSINOPHIL NFR BLD AUTO: 2.4 %
ERYTHROCYTE [DISTWIDTH] IN BLOOD BY AUTOMATED COUNT: 17.9 % (ref 12–15)
GLUCOSE SERPL-MCNC: 116 MG/DL (ref 70–100)
GLUCOSE UR QL STRIP.AUTO: NEGATIVE MG/DL
HGB UR QL STRIP: 11.4 G/DL (ref 12–16)
KETONES UR QL STRIP.AUTO: NEGATIVE MG/DL
LYMPHOCYTES # SPEC AUTO: 2.6 10^3/UL (ref 1.5–3.5)
LYMPHOCYTES NFR BLD AUTO: 41 %
MAGNESIUM SERPL-MCNC: 1.9 MG/DL (ref 1.7–2.8)
MCH RBC QN AUTO: 27 PG (ref 27–31)
MCHC RBC AUTO-ENTMCNC: 28.9 G/DL (ref 32–36)
MCV RBC AUTO: 93.4 FL (ref 81–99)
MONOCYTES # BLD AUTO: 0.8 10^3/UL (ref 0–1)
MONOCYTES NFR BLD AUTO: 12.2 %
NEUTROPHILS # BLD AUTO: 2.7 10^3/UL (ref 1.5–6.6)
NEUTROPHILS # SNV AUTO: 6.2 X10^3/UL (ref 4.8–10.8)
NEUTROPHILS NFR BLD AUTO: 43.5 %
NITRITE UR QL STRIP.AUTO: POSITIVE
PDW BLD AUTO: 12.9 FL (ref 7.9–10.8)
PH UR STRIP.AUTO: 7.5 PH (ref 5–7.5)
PLATELET # BLD: 558 10^3/UL (ref 130–450)
PROT UR STRIP.AUTO-MCNC: NEGATIVE MG/DL
RBC # UR STRIP.AUTO: NEGATIVE /UL
RBC MAR: 4.23 10^6/UL (ref 4.2–5.4)
SP GR UR STRIP.AUTO: 1.01 (ref 1–1.03)
SQUAMOUS URNS QL MICRO: (no result)
UROBILINOGEN UR QL STRIP.AUTO: (no result) E.U./DL
UROBILINOGEN UR STRIP.AUTO-MCNC: NEGATIVE MG/DL

## 2020-12-27 RX ADMIN — ATORVASTATIN CALCIUM SCH: 10 TABLET, FILM COATED ORAL at 20:38

## 2020-12-27 RX ADMIN — DEXTROSE AND SODIUM CHLORIDE SCH MLS/HR: 5; 450 INJECTION, SOLUTION INTRAVENOUS at 04:34

## 2020-12-27 RX ADMIN — SODIUM CHLORIDE, PRESERVATIVE FREE SCH ML: 5 INJECTION INTRAVENOUS at 23:42

## 2020-12-27 RX ADMIN — SODIUM CHLORIDE, PRESERVATIVE FREE SCH ML: 5 INJECTION INTRAVENOUS at 00:00

## 2020-12-27 RX ADMIN — SODIUM CHLORIDE, PRESERVATIVE FREE SCH: 5 INJECTION INTRAVENOUS at 16:49

## 2020-12-27 RX ADMIN — DEXTROSE AND SODIUM CHLORIDE SCH MLS/HR: 5; 450 INJECTION, SOLUTION INTRAVENOUS at 16:49

## 2020-12-27 RX ADMIN — SODIUM CHLORIDE, PRESERVATIVE FREE SCH ML: 5 INJECTION INTRAVENOUS at 08:56

## 2020-12-27 NOTE — MRI REPORT
PROCEDURE:  Brain W/O

 

INDICATIONS:  Altered mental status. Seizure suspected.

 

TECHNIQUE:  

Noncontrast axial T1 spin echo, axial T2 fast spin echo, sagittal and axial FLAIR, coronal T2 fast sp
in echo, axial gradient echo, axial diffusion and ADC through the brain.  

 

COMPARISON:  None.

 

FINDINGS:  

Image quality: Motion artifact limits evaluation.

 

CSF Spaces:  Basal cisterns are patent.  No extra-axial fluid collections.  Ventricles are normal in 
size and shape.  

 

Brain:  No intracranial masses or hemorrhage.  Gray/white matter interface is normal.  Brainstem appe
ars normal. No increased history to diffusion to suggest acute or subacute infarct. There is extensiv
e volume loss, greater than expected for patient age and deep and periventricular white matter change
s consistent with microvascular ischemia. Normal intravascular flow voids are present.  

 

Skull and face:  Calvarium has normal marrow signal.  Orbits appear normal.  

 

Sinuses:  Sinuses and mastoids are clear.  

 

IMPRESSION:  

 

1. Limited study given motion artifact. No acute intercranial findings. Specifically, no findings to 
suggest acute or subacute infarct.

 

 

2. Extensive volume loss and deep and periventricular white matter changes consistent with chronic mi
crovascular ischemic changes.

 

Reviewed by: Trixie Butcher MD on 12/27/2020 11:11 AM [facility]

Approved by: Trixie Butcher MD on 12/27/2020 11:11 AM [facility]

 

 

Station ID:  [id number]

## 2020-12-27 NOTE — PROVIDER PROGRESS NOTE
Subjective





- Prog Note Date


Prog Note Date: 12/27/20





- Subjective


Subjective: 


He is more awake.  Her eyes are open.  She moving both of her upper extremities.

 She will only say yes or no to questions.





Current Medications





- Current Medications


Current Medications: 





Active Medications





Acetaminophen (Acetaminophen 325 Mg Tablet)  650 mg PO Q4HR PRN


   PRN Reason: Pain 1 to 4


Enoxaparin Sodium (Enoxaparin 30 Mg/0.3 Ml Syringe)  30 mg SUBQ DAILY [facility]


Dextrose/Sodium Chloride (D5.45ns)  1,000 mls @ 83.333 mls/hr IV .Q12H [facility]


   Last Admin: 12/27/20 04:34 Dose:  83.333 mls/hr


   Documented by: 


Ondansetron HCl (Ondansetron Odt 4 Mg Tablet)  4 mg TL Q6HR PRN


   PRN Reason: Nausea / Vomiting


Sodium Chloride (Sodium Chloride Flush 0.9% 10 Ml Syringe)  10 ml IVP PRN PRN


   PRN Reason: AS NEEDED PER PROVIDER ORDERS


Sodium Chloride (Sodium Chloride Flush 0.9% 10 Ml Syringe)  10 ml IVP 

0100,0900,1700 [facility]


   Last Admin: 12/27/20 08:56 Dose:  10 ml


   Documented by: 





                                        





ALPRAZolam [Alprazolam] 0.25 mg PO Q4HR PRN MDD 4 DOSES OF 0.25MG 05/17/16 


Esomeprazole Magnesium 40 mg PO QDAC 10/12/19 


Lovastatin 80 mg PO QPM 10/12/19 


Mirtazapine 7.5 mg PO QPM 10/12/19 


Aspirin [Aspirin EC] 81 mg PO DAILY 10/13/19 


Cholecalciferol (Vitamin D3) [Vitamin D3] 1,000 unit PO DAILY 03/23/20 


Ferrous Sulfate 325 mg PO DAILY 03/23/20 


Dorzolamide HCl/Timolol Maleat [Dorzolamide-Timolol Eye Drops] 1 drops EACHEYE 

BID 05/12/20 


prednisoLONE 1% OPHTH DROPS [Pred Forte 1% Ophth Drops] 1 drops RIGHTEYE BID 

11/10/20 


Divalproex Dr [Depakote Dr] 125 mg PO BID 12/26/20 


Pregabalin [Lyrica] 50 mg PO TID 12/26/20 











Objective





- Vital Signs/Intake & Output


Reviewed Vital Signs: Yes


Vital Signs: 


                                Vital Signs x48h











  Temp Pulse Resp BP Pulse Ox


 


 12/27/20 04:23  36.4 C L  76  16  148/74 H  100











Intake & Output: 


                                 Intake & Output











 12/24/20 12/25/20 12/26/20 12/27/20





 23:59 23:59 23:59 23:59


 


Intake Total   1000 2000


 


Output Total   1010 125


 


Balance   -10 1875














- Objective


General Appearance: positive: Alert


Eyes Bilateral: positive: Normal inspection


ENT: positive: ENT inspection nml


Neck: positive: Nml inspection


Respiratory: positive: No respiratory distress.  negative: Wheezes, Rales


Cardiovascular: positive: Regular rate & rhythm.  negative: Tachycardia


Skin: positive: Warm, Dry


Extremities: positive: Other (Bilateral below the knee amputation.)


Neurologic/Psychiatric: positive: Other (She moving both of her upper 

extremities.  She will answer yes or no when spoken to.  She does follow some 

commands when I asked her to move her arms.)





- Lab Results


Fish Bones: 


                                 12/27/20 05:02





                                 12/27/20 05:02


Other Labs: 


                               Lab Results x24hrs











  12/27/20 12/27/20 12/27/20 Range/Units





  07:30 05:02 05:02 


 


WBC    6.2  (4.8-10.8)  x10^3/uL


 


RBC    4.23  (4.20-5.40)  10^6/uL


 


Hgb    11.4 L  (12.0-16.0)  g/dL


 


Hct    39.5  (37.0-47.0)  %


 


MCV    93.4  (81.0-99.0)  fL


 


MCH    27.0  (27.0-31.0)  pg


 


MCHC    28.9 L  (32.0-36.0)  g/dL


 


RDW    17.9 H  (12.0-15.0)  %


 


Plt Count    558 H  (130-450)  10^3/uL


 


MPV    12.9 H  (7.9-10.8)  fL


 


Neut # (Auto)    2.7  (1.5-6.6)  10^3/uL


 


Lymph # (Auto)    2.6  (1.5-3.5)  10^3/uL


 


Mono # (Auto)    0.8  (0.0-1.0)  10^3/uL


 


Eos # (Auto)    0.2  (0.0-0.7)  10^3/uL


 


Baso # (Auto)    0.0  (0.0-0.1)  10^3/uL


 


Absolute Nucleated RBC    0.07  x10^3/uL


 


Nucleated RBC %    1.1  /100WBC


 


VBG pH     (7.31-7.41)  


 


VBG pCO2     (41-51)  mmHg


 


VBG pO2     (25-47)  mmHg


 


VBG HCO3     (23-28)  mmol/L


 


VBG Total CO2     (24-29)  mmol/L


 


VBG O2 Saturation     (60-80)  %


 


VBG Base Excess     (-2 - +2)  mmol/L


 


Sodium   140   (135-145)  mmol/L


 


Potassium   3.2 L   (3.5-5.0)  mmol/L


 


Chloride   109   (101-111)  mmol/L


 


Carbon Dioxide   23   (21-32)  mmol/L


 


Anion Gap   8.0   (6-13)  


 


BUN   6   (6-20)  mg/dL


 


Creatinine   0.8   (0.4-1.0)  mg/dL


 


Estimated GFR (MDRD)   83 L   (>89)  


 


Glucose   116 H   ()  mg/dL


 


Lactic Acid     (0.5-2.2)  mmol/L


 


Calcium   9.5   (8.5-10.3)  mg/dL


 


Magnesium   1.9   (1.7-2.8)  mg/dL


 


Total Bilirubin     (0.2-1.0)  mg/dL


 


AST     (10-42)  IU/L


 


ALT     (10-60)  IU/L


 


Alkaline Phosphatase     ()  IU/L


 


Ammonia     (7-35)  umol/L


 


Total Creatine Kinase     ()  IU/L


 


Total Protein     (6.7-8.2)  g/dL


 


Albumin     (3.2-5.5)  g/dL


 


Globulin     (2.1-4.2)  g/dL


 


Albumin/Globulin Ratio     (1.0-2.2)  


 


Lipase     (22-51)  U/L


 


Urine Color  YELLOW    


 


Urine Clarity  CLEAR    (CLEAR)  


 


Urine pH  7.5    (5.0-7.5)  PH


 


Ur Specific Gravity  1.015    (1.002-1.030)  


 


Urine Protein  NEGATIVE    (NEGATIVE)  mg/dL


 


Urine Glucose (UA)  NEGATIVE    (NEGATIVE)  mg/dL


 


Urine Ketones  NEGATIVE    (NEGATIVE)  mg/dL


 


Urine Occult Blood  NEGATIVE    (NEGATIVE)  


 


Urine Nitrite  POSITIVE H    (NEGATIVE)  


 


Urine Bilirubin  NEGATIVE    (NEGATIVE)  


 


Urine Urobilinogen  0.2 (NORMAL)    (NORMAL)  E.U./dL


 


Ur Leukocyte Esterase  NEGATIVE    (NEGATIVE)  


 


Urine RBC  None Seen    (0-5)  /HPF


 


Urine WBC  0-3    (0-5)  /HPF


 


Ur Squamous Epith Cells  RARE Squamous    (<= Few)  


 


Urine Bacteria  Many H    (None Seen)  /HPF


 


Ur Microscopic Review  INDICATED    


 


Nasal Adenovirus (PCR)     


 


Nasal B. parapertussis DNA (PCR)     


 


Nasal Coronavir 229E PCR     


 


Nasal Coronavir HKU1 PCR     


 


Nasal Coronavir NL63 PCR     


 


Nasal Coronavir OC43 PCR     


 


Nasal Enterovir/Rhinovir PCR     


 


Nasal Influenza B PCR     


 


Nasal Influenza A PCR     


 


Nasal Parainfluen 1 PCR     


 


Nasal Parainfluen 2 PCR     


 


Nasal Parainfluen 3 PCR     


 


Nasal Parainfluen 4 PCR     


 


Nasal RSV (PCR)     


 


Nasal B.pertussis DNA PCR     


 


Nasal C.pneumoniae (PCR)     


 


Phil Human Metapneumo PCR     


 


Nasal M.pneumoniae (PCR)     


 


Nasal SARS-CoV-2 (PCR)     


 


Urine Opiates Screen     (NEGATIVE)  


 


Ur Oxycodone Screen     (NEGATIVE)  


 


Urine Methadone Screen     (NEGATIVE)  


 


Ur Propoxyphene Screen     (NEGATIVE)  


 


Ur Barbiturates Screen     (NEGATIVE)  


 


Ur Tricyclics Screen     (NEGATIVE)  


 


Ur Phencyclidine Scrn     (NEGATIVE)  


 


Ur Amphetamine Screen     (NEGATIVE)  


 


U Methamphetamines Scrn     (NEGATIVE)  


 


U Benzodiazepines Scrn     (NEGATIVE)  


 


Urine Cocaine Screen     (NEGATIVE)  


 


U Cannabinoids Screen     (NEGATIVE)  














  12/26/20 12/26/20 12/26/20 Range/Units





  22:00 19:46 15:20 


 


WBC     (4.8-10.8)  x10^3/uL


 


RBC     (4.20-5.40)  10^6/uL


 


Hgb     (12.0-16.0)  g/dL


 


Hct     (37.0-47.0)  %


 


MCV     (81.0-99.0)  fL


 


MCH     (27.0-31.0)  pg


 


MCHC     (32.0-36.0)  g/dL


 


RDW     (12.0-15.0)  %


 


Plt Count     (130-450)  10^3/uL


 


MPV     (7.9-10.8)  fL


 


Neut # (Auto)     (1.5-6.6)  10^3/uL


 


Lymph # (Auto)     (1.5-3.5)  10^3/uL


 


Mono # (Auto)     (0.0-1.0)  10^3/uL


 


Eos # (Auto)     (0.0-0.7)  10^3/uL


 


Baso # (Auto)     (0.0-0.1)  10^3/uL


 


Absolute Nucleated RBC     x10^3/uL


 


Nucleated RBC %     /100WBC


 


VBG pH   7.346   (7.31-7.41)  


 


VBG pCO2   38.6 L   (41-51)  mmHg


 


VBG pO2   22.3 L   (25-47)  mmHg


 


VBG HCO3   20.6 L   (23-28)  mmol/L


 


VBG Total CO2   21.8 L   (24-29)  mmol/L


 


VBG O2 Saturation   33.8 L   (60-80)  %


 


VBG Base Excess   -4.6 L   (-2 - +2)  mmol/L


 


Sodium     (135-145)  mmol/L


 


Potassium     (3.5-5.0)  mmol/L


 


Chloride     (101-111)  mmol/L


 


Carbon Dioxide     (21-32)  mmol/L


 


Anion Gap     (6-13)  


 


BUN     (6-20)  mg/dL


 


Creatinine     (0.4-1.0)  mg/dL


 


Estimated GFR (MDRD)     (>89)  


 


Glucose     ()  mg/dL


 


Lactic Acid     (0.5-2.2)  mmol/L


 


Calcium     (8.5-10.3)  mg/dL


 


Magnesium     (1.7-2.8)  mg/dL


 


Total Bilirubin     (0.2-1.0)  mg/dL


 


AST     (10-42)  IU/L


 


ALT     (10-60)  IU/L


 


Alkaline Phosphatase     ()  IU/L


 


Ammonia     (7-35)  umol/L


 


Total Creatine Kinase    91  ()  IU/L


 


Total Protein     (6.7-8.2)  g/dL


 


Albumin     (3.2-5.5)  g/dL


 


Globulin     (2.1-4.2)  g/dL


 


Albumin/Globulin Ratio     (1.0-2.2)  


 


Lipase     (22-51)  U/L


 


Urine Color     


 


Urine Clarity     (CLEAR)  


 


Urine pH     (5.0-7.5)  PH


 


Ur Specific Gravity     (1.002-1.030)  


 


Urine Protein     (NEGATIVE)  mg/dL


 


Urine Glucose (UA)     (NEGATIVE)  mg/dL


 


Urine Ketones     (NEGATIVE)  mg/dL


 


Urine Occult Blood     (NEGATIVE)  


 


Urine Nitrite     (NEGATIVE)  


 


Urine Bilirubin     (NEGATIVE)  


 


Urine Urobilinogen     (NORMAL)  E.U./dL


 


Ur Leukocyte Esterase     (NEGATIVE)  


 


Urine RBC     (0-5)  /HPF


 


Urine WBC     (0-5)  /HPF


 


Ur Squamous Epith Cells     (<= Few)  


 


Urine Bacteria     (None Seen)  /HPF


 


Ur Microscopic Review     


 


Nasal Adenovirus (PCR)     


 


Nasal B. parapertussis DNA (PCR)     


 


Nasal Coronavir 229E PCR     


 


Nasal Coronavir HKU1 PCR     


 


Nasal Coronavir NL63 PCR     


 


Nasal Coronavir OC43 PCR     


 


Nasal Enterovir/Rhinovir PCR     


 


Nasal Influenza B PCR     


 


Nasal Influenza A PCR     


 


Nasal Parainfluen 1 PCR     


 


Nasal Parainfluen 2 PCR     


 


Nasal Parainfluen 3 PCR     


 


Nasal Parainfluen 4 PCR     


 


Nasal RSV (PCR)     


 


Nasal B.pertussis DNA PCR     


 


Nasal C.pneumoniae (PCR)     


 


Phil Human Metapneumo PCR     


 


Nasal M.pneumoniae (PCR)     


 


Nasal SARS-CoV-2 (PCR)     


 


Urine Opiates Screen  NEGATIVE    (NEGATIVE)  


 


Ur Oxycodone Screen  NEGATIVE    (NEGATIVE)  


 


Urine Methadone Screen  NEGATIVE    (NEGATIVE)  


 


Ur Propoxyphene Screen  NEGATIVE    (NEGATIVE)  


 


Ur Barbiturates Screen  NEGATIVE    (NEGATIVE)  


 


Ur Tricyclics Screen  NEGATIVE    (NEGATIVE)  


 


Ur Phencyclidine Scrn  NEGATIVE    (NEGATIVE)  


 


Ur Amphetamine Screen  NEGATIVE    (NEGATIVE)  


 


U Methamphetamines Scrn  NEGATIVE    (NEGATIVE)  


 


U Benzodiazepines Scrn  POSITIVE H    (NEGATIVE)  


 


Urine Cocaine Screen  NEGATIVE    (NEGATIVE)  


 


U Cannabinoids Screen  NEGATIVE    (NEGATIVE)  














  12/26/20 12/26/20 12/26/20 Range/Units





  15:19 13:42 12:47 


 


WBC     (4.8-10.8)  x10^3/uL


 


RBC     (4.20-5.40)  10^6/uL


 


Hgb     (12.0-16.0)  g/dL


 


Hct     (37.0-47.0)  %


 


MCV     (81.0-99.0)  fL


 


MCH     (27.0-31.0)  pg


 


MCHC     (32.0-36.0)  g/dL


 


RDW     (12.0-15.0)  %


 


Plt Count     (130-450)  10^3/uL


 


MPV     (7.9-10.8)  fL


 


Neut # (Auto)     (1.5-6.6)  10^3/uL


 


Lymph # (Auto)     (1.5-3.5)  10^3/uL


 


Mono # (Auto)     (0.0-1.0)  10^3/uL


 


Eos # (Auto)     (0.0-0.7)  10^3/uL


 


Baso # (Auto)     (0.0-0.1)  10^3/uL


 


Absolute Nucleated RBC     x10^3/uL


 


Nucleated RBC %     /100WBC


 


VBG pH     (7.31-7.41)  


 


VBG pCO2     (41-51)  mmHg


 


VBG pO2     (25-47)  mmHg


 


VBG HCO3     (23-28)  mmol/L


 


VBG Total CO2     (24-29)  mmol/L


 


VBG O2 Saturation     (60-80)  %


 


VBG Base Excess     (-2 - +2)  mmol/L


 


Sodium     (135-145)  mmol/L


 


Potassium     (3.5-5.0)  mmol/L


 


Chloride     (101-111)  mmol/L


 


Carbon Dioxide     (21-32)  mmol/L


 


Anion Gap     (6-13)  


 


BUN     (6-20)  mg/dL


 


Creatinine     (0.4-1.0)  mg/dL


 


Estimated GFR (MDRD)     (>89)  


 


Glucose     ()  mg/dL


 


Lactic Acid    1.8  (0.5-2.2)  mmol/L


 


Calcium     (8.5-10.3)  mg/dL


 


Magnesium     (1.7-2.8)  mg/dL


 


Total Bilirubin     (0.2-1.0)  mg/dL


 


AST     (10-42)  IU/L


 


ALT     (10-60)  IU/L


 


Alkaline Phosphatase     ()  IU/L


 


Ammonia  23.1    (7-35)  umol/L


 


Total Creatine Kinase     ()  IU/L


 


Total Protein     (6.7-8.2)  g/dL


 


Albumin     (3.2-5.5)  g/dL


 


Globulin     (2.1-4.2)  g/dL


 


Albumin/Globulin Ratio     (1.0-2.2)  


 


Lipase     (22-51)  U/L


 


Urine Color     


 


Urine Clarity     (CLEAR)  


 


Urine pH     (5.0-7.5)  PH


 


Ur Specific Gravity     (1.002-1.030)  


 


Urine Protein     (NEGATIVE)  mg/dL


 


Urine Glucose (UA)     (NEGATIVE)  mg/dL


 


Urine Ketones     (NEGATIVE)  mg/dL


 


Urine Occult Blood     (NEGATIVE)  


 


Urine Nitrite     (NEGATIVE)  


 


Urine Bilirubin     (NEGATIVE)  


 


Urine Urobilinogen     (NORMAL)  E.U./dL


 


Ur Leukocyte Esterase     (NEGATIVE)  


 


Urine RBC     (0-5)  /HPF


 


Urine WBC     (0-5)  /HPF


 


Ur Squamous Epith Cells     (<= Few)  


 


Urine Bacteria     (None Seen)  /HPF


 


Ur Microscopic Review     


 


Nasal Adenovirus (PCR)   NOT DETECTED   


 


Nasal B. parapertussis DNA (PCR)   NOT DETECTED   


 


Nasal Coronavir 229E PCR   NOT DETECTED   


 


Nasal Coronavir HKU1 PCR   NOT DETECTED   


 


Nasal Coronavir NL63 PCR   NOT DETECTED   


 


Nasal Coronavir OC43 PCR   NOT DETECTED   


 


Nasal Enterovir/Rhinovir PCR   NOT DETECTED   


 


Nasal Influenza B PCR   NOT DETECTED   


 


Nasal Influenza A PCR   NOT DETECTED   


 


Nasal Parainfluen 1 PCR   NOT DETECTED   


 


Nasal Parainfluen 2 PCR   NOT DETECTED   


 


Nasal Parainfluen 3 PCR   NOT DETECTED   


 


Nasal Parainfluen 4 PCR   NOT DETECTED   


 


Nasal RSV (PCR)   NOT DETECTED   


 


Nasal B.pertussis DNA PCR   NOT DETECTED   


 


Nasal C.pneumoniae (PCR)   NOT DETECTED   


 


Phil Human Metapneumo PCR   NOT DETECTED   


 


Nasal M.pneumoniae (PCR)   NOT DETECTED   


 


Nasal SARS-CoV-2 (PCR)   NOT DETECTED   


 


Urine Opiates Screen     (NEGATIVE)  


 


Ur Oxycodone Screen     (NEGATIVE)  


 


Urine Methadone Screen     (NEGATIVE)  


 


Ur Propoxyphene Screen     (NEGATIVE)  


 


Ur Barbiturates Screen     (NEGATIVE)  


 


Ur Tricyclics Screen     (NEGATIVE)  


 


Ur Phencyclidine Scrn     (NEGATIVE)  


 


Ur Amphetamine Screen     (NEGATIVE)  


 


U Methamphetamines Scrn     (NEGATIVE)  


 


U Benzodiazepines Scrn     (NEGATIVE)  


 


Urine Cocaine Screen     (NEGATIVE)  


 


U Cannabinoids Screen     (NEGATIVE)  














  12/26/20 12/26/20 Range/Units





  12:22 12:22 


 


WBC   8.5  (4.8-10.8)  x10^3/uL


 


RBC   4.61  (4.20-5.40)  10^6/uL


 


Hgb   12.5  (12.0-16.0)  g/dL


 


Hct   41.7  (37.0-47.0)  %


 


MCV   90.5  (81.0-99.0)  fL


 


MCH   27.1  (27.0-31.0)  pg


 


MCHC   30.0 L  (32.0-36.0)  g/dL


 


RDW   18.6 H  (12.0-15.0)  %


 


Plt Count   543 H  (130-450)  10^3/uL


 


MPV   12.9 H  (7.9-10.8)  fL


 


Neut # (Auto)   4.6  (1.5-6.6)  10^3/uL


 


Lymph # (Auto)   3.0  (1.5-3.5)  10^3/uL


 


Mono # (Auto)   0.8  (0.0-1.0)  10^3/uL


 


Eos # (Auto)   0.0  (0.0-0.7)  10^3/uL


 


Baso # (Auto)   0.0  (0.0-0.1)  10^3/uL


 


Absolute Nucleated RBC   0.09  x10^3/uL


 


Nucleated RBC %   1.1  /100WBC


 


VBG pH    (7.31-7.41)  


 


VBG pCO2    (41-51)  mmHg


 


VBG pO2    (25-47)  mmHg


 


VBG HCO3    (23-28)  mmol/L


 


VBG Total CO2    (24-29)  mmol/L


 


VBG O2 Saturation    (60-80)  %


 


VBG Base Excess    (-2 - +2)  mmol/L


 


Sodium  144   (135-145)  mmol/L


 


Potassium  3.6   (3.5-5.0)  mmol/L


 


Chloride  106   (101-111)  mmol/L


 


Carbon Dioxide  22   (21-32)  mmol/L


 


Anion Gap  16.0 H   (6-13)  


 


BUN  9   (6-20)  mg/dL


 


Creatinine  1.1 H   (0.4-1.0)  mg/dL


 


Estimated GFR (MDRD)  58 L   (>89)  


 


Glucose  86   ()  mg/dL


 


Lactic Acid    (0.5-2.2)  mmol/L


 


Calcium  9.9   (8.5-10.3)  mg/dL


 


Magnesium    (1.7-2.8)  mg/dL


 


Total Bilirubin  0.6   (0.2-1.0)  mg/dL


 


AST  22   (10-42)  IU/L


 


ALT  < 10 L   (10-60)  IU/L


 


Alkaline Phosphatase  101   ()  IU/L


 


Ammonia    (7-35)  umol/L


 


Total Creatine Kinase    ()  IU/L


 


Total Protein  9.7 H   (6.7-8.2)  g/dL


 


Albumin  3.4   (3.2-5.5)  g/dL


 


Globulin  6.3 H   (2.1-4.2)  g/dL


 


Albumin/Globulin Ratio  0.5 L   (1.0-2.2)  


 


Lipase  37   (22-51)  U/L


 


Urine Color    


 


Urine Clarity    (CLEAR)  


 


Urine pH    (5.0-7.5)  PH


 


Ur Specific Gravity    (1.002-1.030)  


 


Urine Protein    (NEGATIVE)  mg/dL


 


Urine Glucose (UA)    (NEGATIVE)  mg/dL


 


Urine Ketones    (NEGATIVE)  mg/dL


 


Urine Occult Blood    (NEGATIVE)  


 


Urine Nitrite    (NEGATIVE)  


 


Urine Bilirubin    (NEGATIVE)  


 


Urine Urobilinogen    (NORMAL)  E.U./dL


 


Ur Leukocyte Esterase    (NEGATIVE)  


 


Urine RBC    (0-5)  /HPF


 


Urine WBC    (0-5)  /HPF


 


Ur Squamous Epith Cells    (<= Few)  


 


Urine Bacteria    (None Seen)  /HPF


 


Ur Microscopic Review    


 


Nasal Adenovirus (PCR)    


 


Nasal B. parapertussis DNA (PCR)    


 


Nasal Coronavir 229E PCR    


 


Nasal Coronavir HKU1 PCR    


 


Nasal Coronavir NL63 PCR    


 


Nasal Coronavir OC43 PCR    


 


Nasal Enterovir/Rhinovir PCR    


 


Nasal Influenza B PCR    


 


Nasal Influenza A PCR    


 


Nasal Parainfluen 1 PCR    


 


Nasal Parainfluen 2 PCR    


 


Nasal Parainfluen 3 PCR    


 


Nasal Parainfluen 4 PCR    


 


Nasal RSV (PCR)    


 


Nasal B.pertussis DNA PCR    


 


Nasal C.pneumoniae (PCR)    


 


Phil Human Metapneumo PCR    


 


Nasal M.pneumoniae (PCR)    


 


Nasal SARS-CoV-2 (PCR)    


 


Urine Opiates Screen    (NEGATIVE)  


 


Ur Oxycodone Screen    (NEGATIVE)  


 


Urine Methadone Screen    (NEGATIVE)  


 


Ur Propoxyphene Screen    (NEGATIVE)  


 


Ur Barbiturates Screen    (NEGATIVE)  


 


Ur Tricyclics Screen    (NEGATIVE)  


 


Ur Phencyclidine Scrn    (NEGATIVE)  


 


Ur Amphetamine Screen    (NEGATIVE)  


 


U Methamphetamines Scrn    (NEGATIVE)  


 


U Benzodiazepines Scrn    (NEGATIVE)  


 


Urine Cocaine Screen    (NEGATIVE)  


 


U Cannabinoids Screen    (NEGATIVE)  














ABX Reporting


Has patient been on IV antibiotics over the past 48 hours?: No





Assessment/Plan





- Problem List


(1) Encephalopathy


Impression: 


The etiology of this is not clear.  She has shown improvement as she is now 

awake and less rigid.  She is able to answer yes or no although this is all that

 she is saying.  She does appear to follow some commands intermittently.  MRI of

 the brain was negative for stroke and showed no acute abnormalities.  Her labs 

are unremarkable.  She was admitted earlier this year for similar presentation 

and appears she was discharged on hospice and it seems that since being home she

 has done quite well.  I do wonder if there could be a component of medication 

reaction given she is on baclofen and Lyrica at home.  We will continue to hold 

all sedatives at this time.  We will continue with gentle IV hydration.  We will

 hold off on lumbar puncture given her improvement.  We will continue to monitor

 her neuro function overnight.  I spoke with her  and he is in agreement 

with this plan.








(2) Dementia


Impression: 


She has dementia but she is relatively functional at baseline.  Her current ence

phalopathy is an acute change.  The plan is hopefully discharge back home to her

 prior living situation.  Appreciate social work input.








(3) Asymptomatic bacteriuria


Impression: 


Her urinalysis does reveal nitrites and bacteria.  Suspect this is likely 

asymptomatic bacteriuria.  There is no pyuria.  Her mentation is also improving 

without treatment.  We will continue to hold off on antibiotics.








(4) History of below-knee amputation of both lower extremities


Impression: 


Stable.  We will resume her aspirin if she is able to take p.o.  We will hold 

off on Lyrica given her encephalopathy.








(5) Essential thrombocythemia


Impression: 


This is chronic and stable.

## 2020-12-28 LAB
ANION GAP SERPL CALCULATED.4IONS-SCNC: 9 MMOL/L (ref 6–13)
BASOPHILS # BLD MANUAL: 0 10^3/UL (ref 0–0.1)
BASOPHILS NFR BLD AUTO: 0.4 %
BUN SERPL-MCNC: < 5 MG/DL (ref 6–20)
CALCIUM UR-MCNC: 9.1 MG/DL (ref 8.5–10.3)
CHLORIDE SERPL-SCNC: 106 MMOL/L (ref 101–111)
CO2 SERPL-SCNC: 21 MMOL/L (ref 21–32)
CREAT SERPLBLD-SCNC: 0.6 MG/DL (ref 0.4–1)
EOSINOPHIL # BLD MANUAL: 0 10^3/UL (ref 0–0.7)
EOSINOPHIL NFR BLD AUTO: 0.4 %
ERYTHROCYTE [DISTWIDTH] IN BLOOD BY AUTOMATED COUNT: 17.6 % (ref 12–15)
GLUCOSE SERPL-MCNC: 130 MG/DL (ref 70–100)
HGB UR QL STRIP: 11.7 G/DL (ref 12–16)
LYMPH ABN NFR BLD MANUAL: 0 %
LYMPHOBLASTS # BLD: 32 %
LYMPHOCYTES # BLD MANUAL: 2.7 10^3/UL (ref 1.5–3.5)
LYMPHOCYTES NFR BLD AUTO: 26.1 %
MAGNESIUM SERPL-MCNC: 1.7 MG/DL (ref 1.7–2.8)
MANUAL DIF COMMENT BLD-IMP: (no result)
MCH RBC QN AUTO: 27.3 PG (ref 27–31)
MCHC RBC AUTO-ENTMCNC: 30.6 G/DL (ref 32–36)
MCV RBC AUTO: 89 FL (ref 81–99)
MONOCYTES # BLD MANUAL: 1.3 10^3/UL (ref 0–1)
MONOCYTES NFR BLD AUTO: 11.3 %
MYELOCYTES NFR BLD: 1 %
NEUTROPHILS # SNV AUTO: 8.5 X10^3/UL (ref 4.8–10.8)
NEUTROPHILS NFR BLD AUTO: 61.1 %
NEUTS BAND NFR BLD: 0 %
PDW BLD AUTO: 12.6 FL (ref 7.9–10.8)
PLAT MORPH BLD: (no result)
PLATELET # BLD: 560 10^3/UL (ref 130–450)
PLATELET BLD QL SMEAR: (no result)
RBC MAR: 4.29 10^6/UL (ref 4.2–5.4)
RBC MORPH BLD: (no result)

## 2020-12-28 RX ADMIN — ENOXAPARIN SODIUM SCH: 100 INJECTION SUBCUTANEOUS at 09:59

## 2020-12-28 RX ADMIN — POTASSIUM CHLORIDE SCH MLS/HR: 7.46 INJECTION, SOLUTION INTRAVENOUS at 14:06

## 2020-12-28 RX ADMIN — PREDNISOLONE ACETATE SCH DROPS: 10 SUSPENSION/ DROPS OPHTHALMIC at 12:11

## 2020-12-28 RX ADMIN — DORZOLAMIDE HYDROCHLORIDE AND TIMOLOL MALEATE SCH DROPS: 20; 5 SOLUTION OPHTHALMIC at 21:05

## 2020-12-28 RX ADMIN — DORZOLAMIDE HYDROCHLORIDE AND TIMOLOL MALEATE SCH DROPS: 20; 5 SOLUTION OPHTHALMIC at 12:10

## 2020-12-28 RX ADMIN — SODIUM CHLORIDE, PRESERVATIVE FREE SCH ML: 5 INJECTION INTRAVENOUS at 10:06

## 2020-12-28 RX ADMIN — POTASSIUM CHLORIDE SCH MLS/HR: 7.46 INJECTION, SOLUTION INTRAVENOUS at 07:57

## 2020-12-28 RX ADMIN — DEXTROSE AND SODIUM CHLORIDE SCH MLS/HR: 5; 450 INJECTION, SOLUTION INTRAVENOUS at 04:35

## 2020-12-28 RX ADMIN — POTASSIUM CHLORIDE SCH MLS/HR: 7.46 INJECTION, SOLUTION INTRAVENOUS at 11:57

## 2020-12-28 RX ADMIN — DEXTROSE AND SODIUM CHLORIDE SCH MLS/HR: 5; 450 INJECTION, SOLUTION INTRAVENOUS at 15:42

## 2020-12-28 RX ADMIN — PREDNISOLONE ACETATE SCH DROPS: 10 SUSPENSION/ DROPS OPHTHALMIC at 21:05

## 2020-12-28 RX ADMIN — POTASSIUM CHLORIDE SCH MLS/HR: 7.46 INJECTION, SOLUTION INTRAVENOUS at 09:59

## 2020-12-28 RX ADMIN — SODIUM CHLORIDE, PRESERVATIVE FREE SCH: 5 INJECTION INTRAVENOUS at 16:00

## 2020-12-28 RX ADMIN — ATORVASTATIN CALCIUM SCH: 10 TABLET, FILM COATED ORAL at 20:58

## 2020-12-28 RX ADMIN — DIMETHICONE PRN APPLIC: 13000 CREAM TOPICAL at 15:43

## 2020-12-28 RX ADMIN — ASPIRIN SCH: 81 TABLET, COATED ORAL at 09:59

## 2020-12-28 NOTE — XRAY REPORT
PROCEDURE:  Abdomen 1 View X-Ray

 

INDICATIONS:  Vomiting. Constipation.

 

TECHNIQUE: 2 AP views of the abdomen were acquired.  

 

COMPARISON:  CT angiogram abdomen 9/29/2019

 

FINDINGS:  

Surgical changes and devices: There are bilateral common iliac artery stents.  

 

Bowel:  The bowel gas pattern is within normal limits. There is a paucity of gas in the small and lar
ge bowel. Bowel gas demonstrated in the region of the rectum. 

 

Soft tissues: There are scattered vascular calcifications. An ovoid calcification in the left upper q
uadrant measuring up to 0.9 cm is demonstrated corresponding to a peripherally calcified splenic heber
ry aneurysm visualized on the prior CT. This appears similar in size given differences in technique.

 

Bones:  No suspicious bony abnormalities.  

 

IMPRESSION:  

 

1. No definite evidence of bowel obstruction. 

 

2. Oval calcification in the left upper quadrant demonstrated corresponding to a peripherally calcifi
ed splenic artery aneurysm seen on prior CT.

 

Reviewed by: José Johansen MD on 12/28/2020 10:59 AM PST

Approved by: José Johansen MD on 12/28/2020 10:59 AM PST

 

 

Station ID:  [id number]

## 2020-12-28 NOTE — PROVIDER PROGRESS NOTE
Subjective





- Prog Note Date


Prog Note Date: 12/28/20





- Subjective


Subjective: 


She is more awake today and interactive.  Still not back to her baseline.  She 

is able to say yes or no to questions appropriately.  She will occasionally say 

okay.  She was able to tell me her 's name.  She denies pain, difficulty 

breathing.





Current Medications





- Current Medications


Current Medications: 





Active Medications





Acetaminophen (Acetaminophen 325 Mg Tablet)  650 mg PO Q4HR PRN


   PRN Reason: Pain 1 to 4


Aspirin (Aspirin Ec 81 Mg Tablet)  81 mg PO DAILY [facility]


   Last Admin: 12/28/20 09:59 Dose:  Not Given


   Documented by: 


Atorvastatin Calcium (Atorvastatin 10 Mg Tablet)  20 mg PO QPM [facility]


   Last Admin: 12/27/20 20:38 Dose:  Not Given


   Documented by: 


Dorzolamide/Timolol (Dorzolamide/Timolol Ophth Drops)  1 drops EACHEYE BID [facility]


   Last Admin: 12/28/20 12:10 Dose:  1 drops


   Documented by: 


Enoxaparin Sodium (Enoxaparin 30 Mg/0.3 Ml Syringe)  30 mg SUBQ DAILY [facility]


   Last Admin: 12/28/20 09:59 Dose:  Not Given


   Documented by: 


Dextrose/Sodium Chloride (D5.45ns)  1,000 mls @ 83.333 mls/hr IV .Q12H [facility]


   Last Admin: 12/28/20 04:35 Dose:  83.333 mls/hr


   Documented by: 


Mineral Oil (Min Oil/Dimethicon/Coconut Oil 92 Gm Tube)  1 applic TOP PRN PRN


   PRN Reason: Skin Care


Ondansetron HCl (Ondansetron Odt 4 Mg Tablet)  4 mg TL Q6HR PRN


   PRN Reason: Nausea / Vomiting


Polyethylene Glycol (Polyethylene Glycol 3350 17 Gm Packet)  17 gm PO DAILY [facility]


Prednisolone (Prednisolone 1% Ophth Drops 75 Drops/5 Ml Bottle)  1 drops 

RIGHTEYE BID [facility]


   Last Admin: 12/28/20 12:11 Dose:  1 drops


   Documented by: 


Sodium Chloride (Sodium Chloride Flush 0.9% 10 Ml Syringe)  10 ml IVP PRN PRN


   PRN Reason: AS NEEDED PER PROVIDER ORDERS


Sodium Chloride (Sodium Chloride Flush 0.9% 10 Ml Syringe)  10 ml IVP 0100,0900,

1700 [facility]


   Last Admin: 12/28/20 10:06 Dose:  10 ml


   Documented by: 





                                        





ALPRAZolam [Alprazolam] 0.25 mg PO Q4HR PRN MDD 4 DOSES OF 0.25MG 05/17/16 


Esomeprazole Magnesium 40 mg PO QDAC 10/12/19 


Lovastatin 80 mg PO QPM 10/12/19 


Mirtazapine 7.5 mg PO QPM 10/12/19 


Aspirin [Aspirin EC] 81 mg PO DAILY 10/13/19 


Cholecalciferol (Vitamin D3) [Vitamin D3] 1,000 unit PO DAILY 03/23/20 


Ferrous Sulfate 325 mg PO DAILY 03/23/20 


Dorzolamide HCl/Timolol Maleat [Dorzolamide-Timolol Eye Drops] 1 drops EACHEYE 

BID 05/12/20 


prednisoLONE 1% OPHTH DROPS [Pred Forte 1% Ophth Drops] 1 drops RIGHTEYE BID 

11/10/20 


Divalproex Dr [Depakote Dr] 125 mg PO BID 12/26/20 


Pregabalin [Lyrica] 50 mg PO TID 12/26/20 











Objective





- Vital Signs/Intake & Output


Reviewed Vital Signs: Yes


Vital Signs: 


                                Vital Signs x48h











  Temp Pulse Resp BP Pulse Ox


 


 12/28/20 11:45  37.6 C  88  16  157/67 H  94


 


 12/28/20 07:47  37.1 C  88  16  183/116 H  96


 


 12/28/20 04:33  36.4 C L  85  17  164/71 H  96











Intake & Output: 


                                 Intake & Output











 12/25/20 12/26/20 12/27/20 12/28/20





 23:59 23:59 23:59 23:59


 


Intake Total  1000 3000 1080.552


 


Output Total  1010 1275 225


 


Balance  -10 1725 855.552














- Objective


General Appearance: positive: No acute distress, Alert


Eyes Bilateral: positive: Normal inspection


ENT: positive: ENT inspection nml


Neck: positive: Nml inspection


Respiratory: positive: No respiratory distress.  negative: Wheezes, Rales


Cardiovascular: positive: Regular rate & rhythm.  negative: Irregularly 

irregular, Tachycardia


Abdomen: positive: Non-tender.  negative: No distention, Tenderness


Skin: positive: Warm, Dry


Extremities: positive: Other (Bilateral below the knee amputations.)


Neurologic/Psychiatric: positive: Other (She is able to follow commands and move

 her upper extremities when asked.  She is oriented to self.  She recognizes her

  is able to state his name.)





- Lab Results


Fish Bones: 


                                 12/28/20 04:39





                                 12/28/20 04:39


Other Labs: 


                               Lab Results x24hrs











  12/28/20 12/28/20 Range/Units





  04:39 04:39 


 


WBC   8.5  (4.8-10.8)  x10^3/uL


 


RBC   4.29  (4.20-5.40)  10^6/uL


 


Hgb   11.7 L  (12.0-16.0)  g/dL


 


Hct   38.2  (37.0-47.0)  %


 


MCV   89.0  (81.0-99.0)  fL


 


MCH   27.3  (27.0-31.0)  pg


 


MCHC   30.6 L  (32.0-36.0)  g/dL


 


RDW   17.6 H  (12.0-15.0)  %


 


Plt Count   560 H  (130-450)  10^3/uL


 


MPV   12.6 H  (7.9-10.8)  fL


 


Neut # (Auto)   Not Reportable  


 


Lymph # (Auto)   Not Reportable  


 


Mono # (Auto)   Not Reportable  


 


Eos # (Auto)   Not Reportable  


 


Baso # (Auto)   Not Reportable  


 


Absolute Nucleated RBC   Not Reportable  


 


Total Counted   100  


 


Band Neuts % (Manual)   0 (0 - 10) %


 


Abnorm Lymph % (Manual)   0  %


 


Myelocytes %   1 H ( - 0) %


 


Nucleated RBC %   Not Reportable  


 


Neutrophils # (Manual)   4.4  (1.5-6.6)  10^3/uL


 


Lymphocytes # (Manual)   2.7  (1.5-3.5)  10^3/uL


 


Monocytes # (Manual)   1.3 H  (0.0-1.0)  10^3/uL


 


Eosinophils # (Manual)   0.0  (0-0.7)  10^3/uL


 


Basophils # (Manual)   0.0  (0-0.1)  10^3/uL


 


Differential Comment   MANUAL DIFFERENTIAL  


 


WBC Morphology   NORMAL APPEARANCE  (NORMAL)  


 


Platelet Estimate   INCREASED (>450,000)  (NORMAL)  


 


Platelet Morphology   1+ LARGE PLATELETS  (NORMAL)  


 


RBC Morph Micro Appear   NORMAL APPEARANCE  (NORMAL)  


 


Sodium  136   (135-145)  mmol/L


 


Potassium  2.8 L   (3.5-5.0)  mmol/L


 


Chloride  106   (101-111)  mmol/L


 


Carbon Dioxide  21   (21-32)  mmol/L


 


Anion Gap  9.0   (6-13)  


 


BUN  < 5 L   (6-20)  mg/dL


 


Creatinine  0.6   (0.4-1.0)  mg/dL


 


Estimated GFR (MDRD)  116   (>89)  


 


Glucose  130 H   ()  mg/dL


 


Calcium  9.1   (8.5-10.3)  mg/dL


 


Magnesium  1.7   (1.7-2.8)  mg/dL














ABX Reporting


Has patient been on IV antibiotics over the past 48 hours?: No





Assessment/Plan





- Problem List


(1) Encephalopathy


Impression: 


Still did not have a clear explanation of what caused her presentation.  She 

clearly has shown significant improvement although she still not back to her bas

matheus.  She is able to answer yes and no appropriately to questions.  She was 

able to recognize her  and say his name.  MRI of the brain was negative 

for stroke.  Ammonia and TSH were unremarkable.  I doubt this was a medication 

reaction given she has been on baclofen and Lyrica for quite some time and I 

would have expected improvement quicker and she should be back to her baseline 

if this was just a medication reaction.  I did speak with her  regarding 

considering a lumbar puncture but given her improvement and lack of meningeal 

signs, I feel this would be low yield.  We agreed to continue to observe her 

given her improvement.  I am hopeful that she will continue to progress over the

 next 24 hours.








(2) Dementia


Impression: 


Does have dementia at baseline but she is relatively functional.  She is 

normally oriented to self and people around her.  She did recognize it was 

Th[place] the day of.   states she normally does not know the year or 

month.  We will continue to monitor her neurologic status as mentioned above.  

We are hopeful that she will be able to be discharged home when she returned to 

her neurologic status.  I have asked PT to evaluate the patient today.








(3) Asymptomatic bacteriuria


Impression: 


Her urinalysis did reveal bacteria with nitrites.  I suspect this is asymp

tomatic bacteriuria.  I do not believe this is contributing to her 

encephalopathy as she is improving without treatment.  We will continue to hold 

antibiotics.








(4) History of below-knee amputation of both lower extremities


Impression: 


Stable.  We will look to resume her home medications now that she is able to 

take p.o.








(5) Essential thrombocythemia


Impression: 


This is chronic and stable.

## 2020-12-29 LAB
ANION GAP SERPL CALCULATED.4IONS-SCNC: 11 MMOL/L (ref 6–13)
BASOPHILS NFR BLD AUTO: 0 10^3/UL (ref 0–0.1)
BASOPHILS NFR BLD AUTO: 0.2 %
BUN SERPL-MCNC: < 5 MG/DL (ref 6–20)
CALCIUM UR-MCNC: 9.5 MG/DL (ref 8.5–10.3)
CHLORIDE SERPL-SCNC: 107 MMOL/L (ref 101–111)
CLARITY UR REFRACT.AUTO: CLEAR
CO2 SERPL-SCNC: 22 MMOL/L (ref 21–32)
CREAT SERPLBLD-SCNC: 0.7 MG/DL (ref 0.4–1)
EOSINOPHIL # BLD AUTO: 0 10^3/UL (ref 0–0.7)
EOSINOPHIL NFR BLD AUTO: 0.3 %
ERYTHROCYTE [DISTWIDTH] IN BLOOD BY AUTOMATED COUNT: 17.7 % (ref 12–15)
GLUCOSE SERPL-MCNC: 108 MG/DL (ref 70–100)
GLUCOSE UR QL STRIP.AUTO: NEGATIVE MG/DL
HGB UR QL STRIP: 12.4 G/DL (ref 12–16)
KETONES UR QL STRIP.AUTO: NEGATIVE MG/DL
LYMPHOCYTES # SPEC AUTO: 2.8 10^3/UL (ref 1.5–3.5)
LYMPHOCYTES NFR BLD AUTO: 22.9 %
MAGNESIUM SERPL-MCNC: 1.7 MG/DL (ref 1.7–2.8)
MCH RBC QN AUTO: 26.6 PG (ref 27–31)
MCHC RBC AUTO-ENTMCNC: 30.3 G/DL (ref 32–36)
MCV RBC AUTO: 87.6 FL (ref 81–99)
MONOCYTES # BLD AUTO: 1.4 10^3/UL (ref 0–1)
MONOCYTES NFR BLD AUTO: 11 %
NEUTROPHILS # BLD AUTO: 8 10^3/UL (ref 1.5–6.6)
NEUTROPHILS # SNV AUTO: 12.3 X10^3/UL (ref 4.8–10.8)
NEUTROPHILS NFR BLD AUTO: 65 %
NITRITE UR QL STRIP.AUTO: NEGATIVE
PDW BLD AUTO: 11.9 FL (ref 7.9–10.8)
PH UR STRIP.AUTO: 7.5 PH (ref 5–7.5)
PLAT MORPH BLD: (no result)
PLATELET # BLD: 369 10^3/UL (ref 130–450)
PROT UR STRIP.AUTO-MCNC: NEGATIVE MG/DL
RBC # UR STRIP.AUTO: (no result) /UL
RBC # URNS HPF: (no result) /HPF (ref 0–5)
RBC MAR: 4.67 10^6/UL (ref 4.2–5.4)
SP GR UR STRIP.AUTO: 1.01 (ref 1–1.03)
SQUAMOUS URNS QL MICRO: (no result)
UROBILINOGEN UR QL STRIP.AUTO: (no result) E.U./DL
UROBILINOGEN UR STRIP.AUTO-MCNC: NEGATIVE MG/DL

## 2020-12-29 RX ADMIN — ATORVASTATIN CALCIUM SCH MG: 10 TABLET, FILM COATED ORAL at 21:23

## 2020-12-29 RX ADMIN — DORZOLAMIDE HYDROCHLORIDE AND TIMOLOL MALEATE SCH DROPS: 20; 5 SOLUTION OPHTHALMIC at 08:36

## 2020-12-29 RX ADMIN — SENNOSIDES SCH: 8.6 TABLET, FILM COATED ORAL at 08:35

## 2020-12-29 RX ADMIN — DOCUSATE SODIUM SCH: 250 CAPSULE, LIQUID FILLED ORAL at 08:35

## 2020-12-29 RX ADMIN — PREDNISOLONE ACETATE SCH DROPS: 10 SUSPENSION/ DROPS OPHTHALMIC at 21:23

## 2020-12-29 RX ADMIN — DORZOLAMIDE HYDROCHLORIDE AND TIMOLOL MALEATE SCH DROPS: 20; 5 SOLUTION OPHTHALMIC at 21:22

## 2020-12-29 RX ADMIN — POTASSIUM CHLORIDE, DEXTROSE MONOHYDRATE AND SODIUM CHLORIDE SCH MLS/HR: 150; 5; 900 INJECTION, SOLUTION INTRAVENOUS at 09:13

## 2020-12-29 RX ADMIN — ASPIRIN SCH MG: 81 TABLET, COATED ORAL at 08:35

## 2020-12-29 RX ADMIN — SODIUM CHLORIDE, PRESERVATIVE FREE SCH: 5 INJECTION INTRAVENOUS at 08:36

## 2020-12-29 RX ADMIN — FERROUS SULFATE TAB 325 MG (65 MG ELEMENTAL FE) SCH MG: 325 (65 FE) TAB at 08:35

## 2020-12-29 RX ADMIN — SODIUM CHLORIDE, PRESERVATIVE FREE SCH: 5 INJECTION INTRAVENOUS at 03:32

## 2020-12-29 RX ADMIN — DIMETHICONE PRN APPLIC: 13000 CREAM TOPICAL at 21:23

## 2020-12-29 RX ADMIN — DIMETHICONE PRN APPLIC: 13000 CREAM TOPICAL at 12:35

## 2020-12-29 RX ADMIN — ENOXAPARIN SODIUM SCH MG: 100 INJECTION SUBCUTANEOUS at 08:35

## 2020-12-29 RX ADMIN — SODIUM CHLORIDE, PRESERVATIVE FREE SCH: 5 INJECTION INTRAVENOUS at 23:26

## 2020-12-29 RX ADMIN — DEXTROSE AND SODIUM CHLORIDE SCH MLS/HR: 5; 450 INJECTION, SOLUTION INTRAVENOUS at 03:32

## 2020-12-29 RX ADMIN — PREDNISOLONE ACETATE SCH DROPS: 10 SUSPENSION/ DROPS OPHTHALMIC at 08:36

## 2020-12-29 RX ADMIN — SODIUM CHLORIDE, PRESERVATIVE FREE SCH: 5 INJECTION INTRAVENOUS at 16:14

## 2020-12-29 NOTE — PROVIDER PROGRESS NOTE
Assessment/Plan





- Problem List


(1) Encephalopathy


Assessment/Plan: 


We still do not have a clear explanation of what caused her presentation.  CT 

and MRI of the brain were negative for stroke.  Ammonia and TSH were 

unremarkable.  Doubt this was a medication reaction given she has been on 

baclofen and Lyrica for quite some time.


This could have been a medication reaction, to unknown med however.


The last Hospitalist did speak with her  regarding considering a lumbar 

puncture but given her improvement and lack of meningeal signs, he feel this 

would be low yield. 


She clearly has shown improvement although she still not back to her baseline.  

She is able to answer yes and no appropriately to questions.  She could sit at 

edge of bed and balance on her own, during physical therapy today.  


She was able to recognize her  yesterday and say his name. Apparently, 

this similar presentation happened several months ago, and she improved with 

seeing her  face-to-face.  Therefore, will request exception of the 

visitor policy during Covid restrictions and allow him to be at her bedside.  








(2) UTI (urinary tract infection)


Assessment/Plan: 


Her admission urinalysis did reveal many bacteria with nitrites and was agood 

sample.  Today the WBC went up from normal to 12.9.


Her altered mental status may have been her symptom to a UTI. Therefore, I would

not call this asymptomatic bacteriuria.


Will obtain a U/A for culture.


Will treat for UTI, starting empiric antibiotics.  The last 2 time she had 

positive urine cultures here she grew E. coli which was pansensitive and a 

Pseudomonas which was nearly pansensitive. Will start IV Levaquin.








(3) Dementia


Assessment/Plan: 


She does have dementia at baseline but she is relatively functional.  She is 

normally oriented to self and people around her.  She did recognize it was 

Th[place] the day of.   states she normally does not know the year or 

month.  


We will continue to monitor her neurologic status as mentioned above.  We are 

hopeful that she will be able to be discharged home when she returned to her 

neurologic status.  


She is cooperating with PT.








(4) History of below-knee amputation of both lower extremities


Assessment/Plan: 


Stable.  Her bilateral prosthetics were put on by CNA and she could stand and 

pivot.








(5) Hx of thrombocytosis


Assessment/Plan: 


As per Hx and seen the past few days..  Today plts are high-normal.








(6) Hypokalemia


Assessment/Plan: 


Resolved with replacement.


I will order potassium in her IV gentle hydration bag.








- Current Meds


Current Meds: 





                               Current Medications











Generic Name Dose Route Start Last Admin





  Trade Name Pete  PRN Reason Stop Dose Admin


 


Aspirin  81 mg  12/28/20 09:00  12/29/20 08:35





  Aspirin Ec 81 Mg Tablet  PO   81 mg





  DAILY KATHY   Administration


 


Atorvastatin Calcium  20 mg  12/27/20 21:00  12/28/20 20:58





  Atorvastatin 10 Mg Tablet  PO   Not Given





  QPM KATHY  


 


Docusate Sodium  250 - 500 mg  12/29/20 09:00  12/29/20 08:35





  Docusate Sodium 250 Mg Capsule  PO   Not Given





  DAILY KATHY  


 


Dorzolamide/Timolol  1 drops  12/28/20 12:00  12/29/20 08:36





  Dorzolamide/Timolol Ophth Drops  EACHEYE   1 drops





  BID KATHY   Administration


 


Enoxaparin Sodium  30 mg  12/28/20 09:00  12/29/20 08:35





  Enoxaparin 30 Mg/0.3 Ml Syringe  SUBQ   30 mg





  DAILY KATHY   Administration


 


Ferrous Sulfate  325 mg  12/29/20 09:00  12/29/20 08:35





  Ferrous Sulfate 325 Mg Tablet  PO   325 mg





  DAILY KATHY   Administration


 


Potassium Chloride/Dextrose/Sod Cl  1,000 mls @ 60 mls/hr  12/29/20 09:00  

12/29/20 09:13





  IV   60 mls/hr





  [id number] KATHY   Administration


 


Mineral Oil  1 applic  12/28/20 07:22  12/28/20 15:43





  Min Oil/Dimethicon/Coconut Oil 92 Gm Tube  TOP   1 applic





  PRN PRN   Administration





  Skin Care  


 


Polyethylene Glycol  17 gm  12/29/20 09:00  12/29/20 08:36





  Polyethylene Glycol 3350 17 Gm Packet  PO   Not Given





  DAILY KATHY  


 


Prednisolone  1 drops  12/28/20 12:00  12/29/20 08:36





  Prednisolone 1% Ophth Drops 75 Drops/5 Ml Bottle  RIGHTEYE   1 drops





  BID KATHY   Administration


 


Senna  8.6 - 17.2 mg  12/29/20 09:00  12/29/20 08:35





  Senna 8.6 Mg Tablet  PO   Not Given





  DAILY KATHY  


 


Sodium Chloride  10 ml  12/26/20 17:00  12/29/20 08:36





  Sodium Chloride Flush 0.9% 10 Ml Syringe  IVP   Not Given





  0100,0900,1700 KATHY  














- Lab Result


Fish Bone Diagrams: 


                                 12/29/20 09:21





                                 12/29/20 09:21





- Additional Planning


My Orders: 





My Active Orders





12/29/20 08:39


Bowel Protocol [Bowel - Constipation Care] [RC] ONCE 





12/29/20 09:00


D5ns W/20 Meq KCl 1,000 ml IV 60 mls/hr 


Potassium Chlor 10 Meq/100 ml [Potassium Chloride] 10 meq in 100 ml IV Q1H 














Subjective





- Subjective


Patient Reports: Resting Comfortably, No Complaints, Other (Speaks in 1 and 2 

word sentences to me but is completely alert at midday)





Objective


Vital Signs: 





                               Vital Signs - 24 hr











  12/28/20 12/28/20 12/28/20





  11:45 12:30 14:21


 


Temperature 37.6 C  


 


Heart Rate [  88 





Activity]   


 


Heart Rate [ 88  88





Brachial]   


 


Respiratory 16  





Rate   


 


Blood Pressure  181/88 H 





[Activity]   


 


Blood Pressure 157/67 H  171/88 H





[Right Brachial   





artery]   


 


O2 Saturation 94  














  12/28/20 12/28/20 12/29/20





  15:39 20:25 00:01


 


Temperature 36.8 C 36.9 C 37.3 C


 


Heart Rate [   





Activity]   


 


Heart Rate [ 86 80 74





Brachial]   


 


Respiratory 20 14 18





Rate   


 


Blood Pressure   





[Activity]   


 


Blood Pressure 158/93 H 155/93 H 137/76 H





[Right Brachial   





artery]   


 


O2 Saturation 98 97 96














  12/29/20 12/29/20





  04:18 07:46


 


Temperature  37.2 C


 


Heart Rate [  





Activity]  


 


Heart Rate [  83





Brachial]  


 


Respiratory  16





Rate  


 


Blood Pressure  





[Activity]  


 


Blood Pressure  155/93 H





[Right Brachial  





artery]  


 


O2 Saturation 97 98








                                     Oxygen











O2 Source [Without Activity]   Room air


 


O2 Source                      Room air














I&O (Last 24 Hrs): 





                          Intake and Output Totals x24h











 12/27/20 12/28/20 12/29/20





 23:59 23:59 23:59


 


Intake Total 3000 2996.935 859.11


 


Output Total 1275 1175 700


 


Balance 1725 1821.935 159.11











General: Alert


HEENT: Mucous membr. moist/pink


Neck: Supple, No JVD


Neuro: Alert, Disoriented


Cardiovascular: Regular rate, Other


Respiratory: No respiratory distress, Breath sounds nml


Abdomen: Soft


Extremities: No edema, Other (Bilat BKAs)





- Results


Results: 





                               Laboratory Results











WBC  12.3 x10^3/uL (4.8-10.8)  H  12/29/20  09:21    


 


RBC  4.67 10^6/uL (4.20-5.40)   12/29/20  09:21    


 


Hgb  12.4 g/dL (12.0-16.0)   12/29/20  09:21    


 


Hct  40.9 % (37.0-47.0)   12/29/20  09:21    


 


MCV  87.6 fL (81.0-99.0)   12/29/20  09:21    


 


MCH  26.6 pg (27.0-31.0)  L  12/29/20  09:21    


 


MCHC  30.3 g/dL (32.0-36.0)  L  12/29/20  09:21    


 


RDW  17.7 % (12.0-15.0)  H  12/29/20  09:21    


 


Plt Count  369 10^3/uL (130-450)   12/29/20  09:21    


 


MPV  11.9 fL (7.9-10.8)  H  12/29/20  09:21    


 


Neut # (Auto)  8.0 10^3/uL (1.5-6.6)  H  12/29/20  09:21    


 


Lymph # (Auto)  2.8 10^3/uL (1.5-3.5)   12/29/20  09:21    


 


Mono # (Auto)  1.4 10^3/uL (0.0-1.0)  H  12/29/20  09:21    


 


Eos # (Auto)  0.0 10^3/uL (0.0-0.7)   12/29/20  09:21    


 


Baso # (Auto)  0.0 10^3/uL (0.0-0.1)   12/29/20  09:21    


 


Absolute Nucleated RBC  0.08 x10^3/uL  12/29/20  09:21    


 


Total Counted  100   12/28/20  04:39    


 


Band Neuts % (Manual)  0 % (0-10)  12/28/20  04:39    


 


Abnorm Lymph % (Manual)  0 %  12/28/20  04:39    


 


Myelocytes %  1 % (-0) H  12/28/20  04:39    


 


Nucleated RBC %  0.6 /100WBC  12/29/20  09:21    


 


Neutrophils # (Manual)  4.4 10^3/uL (1.5-6.6)   12/28/20  04:39    


 


Lymphocytes # (Manual)  2.7 10^3/uL (1.5-3.5)   12/28/20  04:39    


 


Monocytes # (Manual)  1.3 10^3/uL (0.0-1.0)  H  12/28/20  04:39    


 


Eosinophils # (Manual)  0.0 10^3/uL (0-0.7)   12/28/20  04:39    


 


Basophils # (Manual)  0.0 10^3/uL (0-0.1)   12/28/20  04:39    


 


Differential Comment  MANUAL DIFFERENTIAL   12/28/20  04:39    


 


Manual Slide Review  Indicated   12/29/20  09:21    


 


WBC Morphology  NORMAL APPEARANCE  (NORMAL)   12/28/20  04:39    


 


Platelet Estimate  INCREASED (>450,000)  (NORMAL)   12/28/20  04:39    


 


Platelet Morphology  RARE GIANT PLATELETS  (NORMAL)   12/29/20  09:21    


 


RBC Morph Micro Appear  NORMAL APPEARANCE  (NORMAL)   12/28/20  04:39    


 


VBG pH  7.346  (7.31-7.41)   12/26/20  19:46    


 


VBG pCO2  38.6 mmHg (41-51)  L  12/26/20  19:46    


 


VBG pO2  22.3 mmHg (25-47)  L  12/26/20  19:46    


 


VBG HCO3  20.6 mmol/L (23-28)  L  12/26/20  19:46    


 


VBG Total CO2  21.8 mmol/L (24-29)  L  12/26/20  19:46    


 


VBG O2 Saturation  33.8 % (60-80)  L  12/26/20  19:46    


 


VBG Base Excess  -4.6 mmol/L (-2 - +2)  L  12/26/20  19:46    


 


Sodium  140 mmol/L (135-145)   12/29/20  09:21    


 


Potassium  4.2 mmol/L (3.5-5.0)   12/29/20  09:21    


 


Chloride  107 mmol/L (101-111)   12/29/20  09:21    


 


Carbon Dioxide  22 mmol/L (21-32)   12/29/20  09:21    


 


Anion Gap  11.0  (6-13)   12/29/20  09:21    


 


BUN  < 5 mg/dL (6-20)  L  12/29/20  09:21    


 


Creatinine  0.7 mg/dL (0.4-1.0)   12/29/20  09:21    


 


Estimated GFR (MDRD)  97  (>89)   12/29/20  09:21    


 


Glucose  108 mg/dL ()  H  12/29/20  09:21    


 


Lactic Acid  1.8 mmol/L (0.5-2.2)   12/26/20  12:47    


 


Calcium  9.5 mg/dL (8.5-10.3)   12/29/20  09:21    


 


Magnesium  1.7 mg/dL (1.7-2.8)   12/29/20  09:21    


 


Total Bilirubin  0.6 mg/dL (0.2-1.0)   12/26/20  12:22    


 


AST  22 IU/L (10-42)   12/26/20  12:22    


 


ALT  < 10 IU/L (10-60)  L  12/26/20  12:22    


 


Alkaline Phosphatase  101 IU/L ()   12/26/20  12:22    


 


Ammonia  23.1 umol/L (7-35)   12/26/20  15:19    


 


Total Creatine Kinase  91 IU/L ()   12/26/20  15:20    


 


Total Protein  9.7 g/dL (6.7-8.2)  H  12/26/20  12:22    


 


Albumin  3.4 g/dL (3.2-5.5)   12/26/20  12:22    


 


Globulin  6.3 g/dL (2.1-4.2)  H  12/26/20  12:22    


 


Albumin/Globulin Ratio  0.5  (1.0-2.2)  L  12/26/20  12:22    


 


Lipase  37 U/L (22-51)   12/26/20  12:22    


 


Urine Color  YELLOW   12/27/20  07:30    


 


Urine Clarity  CLEAR  (CLEAR)   12/27/20  07:30    


 


Urine pH  7.5 PH (5.0-7.5)   12/27/20  07:30    


 


Ur Specific Gravity  1.015  (1.002-1.030)   12/27/20  07:30    


 


Urine Protein  NEGATIVE mg/dL (NEGATIVE)   12/27/20  07:30    


 


Urine Glucose (UA)  NEGATIVE mg/dL (NEGATIVE)   12/27/20  07:30    


 


Urine Ketones  NEGATIVE mg/dL (NEGATIVE)   12/27/20  07:30    


 


Urine Occult Blood  NEGATIVE  (NEGATIVE)   12/27/20  07:30    


 


Urine Nitrite  POSITIVE  (NEGATIVE)  H  12/27/20  07:30    


 


Urine Bilirubin  NEGATIVE  (NEGATIVE)   12/27/20  07:30    


 


Urine Urobilinogen  0.2 (NORMAL) E.U./dL (NORMAL)   12/27/20  07:30    


 


Ur Leukocyte Esterase  NEGATIVE  (NEGATIVE)   12/27/20  07:30    


 


Urine RBC  None Seen /HPF (0-5)   12/27/20  07:30    


 


Urine WBC  0-3 /HPF (0-5)   12/27/20  07:30    


 


Ur Squamous Epith Cells  RARE Squamous  (<= Few)   12/27/20  07:30    


 


Urine Bacteria  Many /HPF (None Seen)  H  12/27/20  07:30    


 


Ur Microscopic Review  INDICATED   12/27/20  07:30    


 


Nasal Adenovirus (PCR)  NOT DETECTED   12/26/20  13:42    


 


Nasal B. parapertussis DNA (PCR)  NOT DETECTED   12/26/20  13:42    


 


Nasal Coronavir 229E PCR  NOT DETECTED   12/26/20  13:42    


 


Nasal Coronavir HKU1 PCR  NOT DETECTED   12/26/20  13:42    


 


Nasal Coronavir NL63 PCR  NOT DETECTED   12/26/20  13:42    


 


Nasal Coronavir OC43 PCR  NOT DETECTED   12/26/20  13:42    


 


Nasal Enterovir/Rhinovir PCR  NOT DETECTED   12/26/20  13:42    


 


Nasal Influenza B PCR  NOT DETECTED   12/26/20  13:42    


 


Nasal Influenza A PCR  NOT DETECTED   12/26/20  13:42    


 


Nasal Parainfluen 1 PCR  NOT DETECTED   12/26/20  13:42    


 


Nasal Parainfluen 2 PCR  NOT DETECTED   12/26/20  13:42    


 


Nasal Parainfluen 3 PCR  NOT DETECTED   12/26/20  13:42    


 


Nasal Parainfluen 4 PCR  NOT DETECTED   12/26/20  13:42    


 


Nasal RSV (PCR)  NOT DETECTED   12/26/20  13:42    


 


Nasal B.pertussis DNA PCR  NOT DETECTED   12/26/20  13:42    


 


Nasal C.pneumoniae (PCR)  NOT DETECTED   12/26/20  13:42    


 


Phil Human Metapneumo PCR  NOT DETECTED   12/26/20  13:42    


 


Nasal M.pneumoniae (PCR)  NOT DETECTED   12/26/20  13:42    


 


Nasal SARS-CoV-2 (PCR)  NOT DETECTED   12/26/20  13:42    


 


Urine Opiates Screen  NEGATIVE  (NEGATIVE)   12/26/20  22:00    


 


Ur Oxycodone Screen  NEGATIVE  (NEGATIVE)   12/26/20  22:00    


 


Urine Methadone Screen  NEGATIVE  (NEGATIVE)   12/26/20  22:00    


 


Ur Propoxyphene Screen  NEGATIVE  (NEGATIVE)   12/26/20  22:00    


 


Ur Barbiturates Screen  NEGATIVE  (NEGATIVE)   12/26/20  22:00    


 


Ur Tricyclics Screen  NEGATIVE  (NEGATIVE)   12/26/20  22:00    


 


Ur Phencyclidine Scrn  NEGATIVE  (NEGATIVE)   12/26/20  22:00    


 


Ur Amphetamine Screen  NEGATIVE  (NEGATIVE)   12/26/20  22:00    


 


U Methamphetamines Scrn  NEGATIVE  (NEGATIVE)   12/26/20  22:00    


 


U Benzodiazepines Scrn  POSITIVE  (NEGATIVE)  H  12/26/20  22:00    


 


Urine Cocaine Screen  NEGATIVE  (NEGATIVE)   12/26/20  22:00    


 


U Cannabinoids Screen  NEGATIVE  (NEGATIVE)   12/26/20  22:00    














- Procedures


Procedures: 





Procedures





CENTRAL VENOUS CATHETER PLACEMENT WITH GUIDANCE (08/31/15)


SPINAL TAP (08/31/15)

## 2020-12-30 LAB
ANION GAP SERPL CALCULATED.4IONS-SCNC: 7 MMOL/L (ref 6–13)
BASOPHILS NFR BLD AUTO: 0 10^3/UL (ref 0–0.1)
BASOPHILS NFR BLD AUTO: 0.3 %
BUN SERPL-MCNC: < 5 MG/DL (ref 6–20)
CALCIUM UR-MCNC: 9.3 MG/DL (ref 8.5–10.3)
CHLORIDE SERPL-SCNC: 108 MMOL/L (ref 101–111)
CO2 SERPL-SCNC: 22 MMOL/L (ref 21–32)
CREAT SERPLBLD-SCNC: 0.8 MG/DL (ref 0.4–1)
EOSINOPHIL # BLD AUTO: 0 10^3/UL (ref 0–0.7)
EOSINOPHIL NFR BLD AUTO: 0.5 %
ERYTHROCYTE [DISTWIDTH] IN BLOOD BY AUTOMATED COUNT: 17.7 % (ref 12–15)
GLUCOSE SERPL-MCNC: 100 MG/DL (ref 70–100)
HGB UR QL STRIP: 12.4 G/DL (ref 12–16)
LYMPHOCYTES # SPEC AUTO: 2.9 10^3/UL (ref 1.5–3.5)
LYMPHOCYTES NFR BLD AUTO: 33.2 %
MAGNESIUM SERPL-MCNC: 1.6 MG/DL (ref 1.7–2.8)
MANUAL DIF COMMENT BLD-IMP: (no result)
MCH RBC QN AUTO: 27.6 PG (ref 27–31)
MCHC RBC AUTO-ENTMCNC: 31.7 G/DL (ref 32–36)
MCV RBC AUTO: 86.9 FL (ref 81–99)
MONOCYTES # BLD AUTO: 1.2 10^3/UL (ref 0–1)
MONOCYTES NFR BLD AUTO: 14 %
NEUTROPHILS # BLD AUTO: 4.5 10^3/UL (ref 1.5–6.6)
NEUTROPHILS # SNV AUTO: 8.8 X10^3/UL (ref 4.8–10.8)
NEUTROPHILS NFR BLD AUTO: 51 %
PDW BLD AUTO: 12.7 FL (ref 7.9–10.8)
PLAT MORPH BLD: (no result)
PLATELET # BLD: 423 10^3/UL (ref 130–450)
PLATELET BLD QL SMEAR: (no result)
RBC MAR: 4.5 10^6/UL (ref 4.2–5.4)
RBC MORPH BLD: (no result)

## 2020-12-30 RX ADMIN — POTASSIUM CHLORIDE, DEXTROSE MONOHYDRATE AND SODIUM CHLORIDE SCH MLS/HR: 150; 5; 900 INJECTION, SOLUTION INTRAVENOUS at 02:35

## 2020-12-30 RX ADMIN — DIVALPROEX SODIUM SCH: 125 TABLET, DELAYED RELEASE ORAL at 20:36

## 2020-12-30 RX ADMIN — PREDNISOLONE ACETATE SCH DROPS: 10 SUSPENSION/ DROPS OPHTHALMIC at 20:37

## 2020-12-30 RX ADMIN — FERROUS SULFATE TAB 325 MG (65 MG ELEMENTAL FE) SCH MG: 325 (65 FE) TAB at 09:25

## 2020-12-30 RX ADMIN — DORZOLAMIDE HYDROCHLORIDE AND TIMOLOL MALEATE SCH DROPS: 20; 5 SOLUTION OPHTHALMIC at 20:37

## 2020-12-30 RX ADMIN — DORZOLAMIDE HYDROCHLORIDE AND TIMOLOL MALEATE SCH DROPS: 20; 5 SOLUTION OPHTHALMIC at 09:26

## 2020-12-30 RX ADMIN — ENOXAPARIN SODIUM SCH MG: 100 INJECTION SUBCUTANEOUS at 09:27

## 2020-12-30 RX ADMIN — SODIUM CHLORIDE, PRESERVATIVE FREE SCH: 5 INJECTION INTRAVENOUS at 09:36

## 2020-12-30 RX ADMIN — SENNOSIDES SCH MG: 8.6 TABLET, FILM COATED ORAL at 09:26

## 2020-12-30 RX ADMIN — SODIUM CHLORIDE, PRESERVATIVE FREE SCH: 5 INJECTION INTRAVENOUS at 17:33

## 2020-12-30 RX ADMIN — POTASSIUM CHLORIDE, DEXTROSE MONOHYDRATE AND SODIUM CHLORIDE SCH MLS/HR: 150; 5; 900 INJECTION, SOLUTION INTRAVENOUS at 20:48

## 2020-12-30 RX ADMIN — ASPIRIN SCH MG: 81 TABLET, COATED ORAL at 09:25

## 2020-12-30 RX ADMIN — DOCUSATE SODIUM SCH: 250 CAPSULE, LIQUID FILLED ORAL at 09:36

## 2020-12-30 RX ADMIN — PREDNISOLONE ACETATE SCH DROPS: 10 SUSPENSION/ DROPS OPHTHALMIC at 09:30

## 2020-12-30 RX ADMIN — ATORVASTATIN CALCIUM SCH: 10 TABLET, FILM COATED ORAL at 20:36

## 2020-12-30 NOTE — PROVIDER PROGRESS NOTE
Assessment/Plan





- Problem List


(1) Encephalopathy


Assessment/Plan: 


I now suspect that a UTI caused her presentation of altered mental status.  CT 

and MRI of the brain were negative for stroke.  Ammonia and TSH were 

unremarkable.  Doubt this was a medication reaction given she has been on 

baclofen and Lyrica for quite some time.  This could have been a medication 

reaction, to unknown med however.


She continues to have slow improvement back to her baseline.  She is able to 

speak and participate slightly with physical therapy.  Apparently, this similar 

presentation happened several months ago, and she improved with seeing her 

 face-to-face.  Therefore, requested exception of the visitor policy 

during Covid restrictions and allow him to be at her bedside.  


The  tells me today that she is still not back at her baseline mental 

status.


Continue treating the UTI (see below) and gentle IV hydration and working with 

PT.








(2) E. coli UTI (urinary tract infection)


Assessment/Plan: 


Yesterday the WBC went up from normal to 12.9.  Another U/A was done yesterday 

and showed a UTI and culture was "indicated".


Her admission urinalysis did reveal many bacteria with nitrites and was a good 

sample.  Her altered mental status may have been her symptom of a UT, therefore,

I would not have called that asymptomatic bacteriuria.


The last 2 time she had positive urine cultures here she grew E. coli which was 

pansensitive and a Pseudomonas which was nearly pansensitive. Yesterday, iv 

Levaquin was started, since shw could not take po reliably (was taking only 10% 

of her tray), therefore Fosfomycin was not chosen.


Continue empiric Levaquin.


Await E. coli sensitivity results tomorrow








(3) Dementia


Assessment/Plan: 


She does have dementia at baseline but she is relatively functional.  She is 

normally oriented to self and people around her.  She did recognize it was 

Th[place] but her  stated she normally does not know the year or month.  


She is cooperating with PT.


Our Physical Therapist will likely recommend Home Health PT for after discharge.


It is unclear why she is on Divalproex but this was resumed today in case there 

is a history of seizures








(4) Malnutrition


Assessment/Plan: 


She is taking 10% of her tray while here.  The  told the nurse this is 

how much he usually eats at home as well.


We will request a Dietary consult for malnutrition screening and to give 

recommendations in management and to have the Dietitian speak to the  

when he is here (usually in the afternoon, [place] [facility] [place]








(5) History of below-knee amputation of both lower extremities


Assessment/Plan: 


Stable.  Her bilateral prosthetics were put on by CNA and she could stand and 

pivot.








(6) Glaucoma


Assessment/Plan: 


The  wanted to be assured that her eyedrops have been resumed, which they

have








(7) Hx of thrombocytosis


Assessment/Plan: 


As per Hx and seen the past few days. I now suspect this was reactive, from an 

infection.


Today plts are high-normal.








(8) Hypokalemia


Assessment/Plan: 


Corrected with replacement, IV K riders.


Follow BMP daily.








- Current Meds


Current Meds: 





                               Current Medications











Generic Name Dose Route Start Last Admin





  Trade Name Freq  PRN Reason Stop Dose Admin


 


Aspirin  81 mg  12/28/20 09:00  12/30/20 09:25





  Aspirin Ec 81 Mg Tablet  PO   81 mg





  DAILY KATHY   Administration


 


Atorvastatin Calcium  20 mg  12/27/20 21:00  12/29/20 21:23





  Atorvastatin 10 Mg Tablet  PO   20 mg





  QPM KATHY   Administration


 


Docusate Sodium  250 - 500 mg  12/29/20 09:00  12/30/20 09:36





  Docusate Sodium 250 Mg Capsule  PO   Not Given





  DAILY KATHY  


 


Dorzolamide/Timolol  1 drops  12/28/20 12:00  12/30/20 09:26





  Dorzolamide/Timolol Ophth Drops  EACHEYE   1 drops





  BID KATHY   Administration


 


Enoxaparin Sodium  30 mg  12/28/20 09:00  12/30/20 09:27





  Enoxaparin 30 Mg/0.3 Ml Syringe  SUBQ   30 mg





  DAILY KATHY   Administration


 


Ferrous Sulfate  325 mg  12/29/20 09:00  12/30/20 09:25





  Ferrous Sulfate 325 Mg Tablet  PO   325 mg





  DAILY KATHY   Administration


 


Potassium Chloride/Dextrose/Sod Cl  1,000 mls @ 60 mls/hr  12/29/20 09:00  

12/30/20 02:35





  IV   60 mls/hr





  [id number] KATHY   Administration


 


Levofloxacin  500 mg in 100 mls @ 66.667 mls/hr  12/29/20 14:00  12/29/20 15:00





  Levaquin 500 Mg/100 Ml  IV  12/31/20 15:29  Infused





  Q24H KATHY   Infusion


 


Mineral Oil  1 applic  12/28/20 07:22  12/29/20 21:23





  Min Oil/Dimethicon/Coconut Oil 92 Gm Tube  TOP   1 applic





  PRN PRN   Administration





  Skin Care  


 


Polyethylene Glycol  17 gm  12/29/20 09:00  12/30/20 09:33





  Polyethylene Glycol 3350 17 Gm Packet  PO   17 gm





  DAILY KATHY   Administration


 


Prednisolone  1 drops  12/28/20 12:00  12/30/20 09:30





  Prednisolone 1% Ophth Drops 75 Drops/5 Ml Bottle  RIGHTEYE   1 drops





  BID KATHY   Administration


 


Senna  8.6 - 17.2 mg  12/29/20 09:00  12/30/20 09:26





  Senna 8.6 Mg Tablet  PO   8.6 mg





  DAILY KATHY   Administration


 


Sodium Chloride  10 ml  12/26/20 17:00  12/30/20 09:36





  Sodium Chloride Flush 0.9% 10 Ml Syringe  IVP   Not Given





  0100,0900,1700 KATHY  














- Lab Result


Fish Bone Diagrams: 


                                 12/30/20 07:24





                                 12/30/20 07:24





- Additional Planning


My Orders: 





My Active Orders





12/29/20 11:20


CUL, URINE [RM] Stat 





12/29/20 14:00


levoFLOXacin 500 MG/100 ML [Levaquin 500 mg/100 ml] 500 mg in 100 ml IV Q24H 





12/30/20 10:00


Dietary [Nutrition Consult] [CONS] Routine 














Subjective





- Subjective


Patient Reports: No Complaints, Other (She is sitting upright in her recliner 

chair, turns her head and is able to follow conversation between myself and her 

 in the room, and has 1 or 2 word answers.)


Nursing Reports: Other (Only takes a spoonful of her chocolate pudding and then 

refuses anything more for each meal, per her RN.)





Objective


Vital Signs: 





                               Vital Signs - 24 hr











  12/29/20 12/29/20 12/29/20





  11:18 15:47 19:47


 


Temperature 37.1 C 36.5 C 37.4 C


 


Heart Rate [ 80 74 75





Brachial]   


 


Respiratory 16 20 18





Rate   


 


Blood Pressure 143/94 H 129/96 H 150/86 H





[Right Brachial   





artery]   


 


O2 Saturation 95 97 100














  12/29/20 12/30/20 12/30/20





  23:33 05:07 08:21


 


Temperature 37.3 C 36.7 C 36.4 C L


 


Heart Rate [ 74 83 73





Brachial]   


 


Respiratory 20 20 17





Rate   


 


Blood Pressure 113/86 H 155/81 H 





[Right Brachial   





artery]   


 


O2 Saturation 96 98 98














  12/30/20





  09:00


 


Temperature 


 


Heart Rate [ 79





Brachial] 


 


Respiratory 





Rate 


 


Blood Pressure 141/101 H





[Right Brachial 





artery] 


 


O2 Saturation 








                                     Oxygen











O2 Source [Without Activity]   Room air


 


O2 Source                      Room air














I&O (Last 24 Hrs): 





                          Intake and Output Totals x24h











 12/28/20 12/29/20 12/30/20





 23:59 23:59 23:59


 


Intake Total 2996.935 1323.11 760


 


Output Total 1175 950 50


 


Balance 1821.935 373.11 710











General: Alert


HEENT: EOMI, Mucous membr. moist/pink


Neck: Supple, No JVD


Neuro: Alert, Disoriented, Non Focal


Cardiovascular: Regular rate, No murmurs


Respiratory: No respiratory distress


Abdomen: Soft


Extremities: No edema


Skin: No rashes ((+) skin tenting of arms)





- Results


Results: 





                               Laboratory Results











WBC  8.8 x10^3/uL (4.8-10.8)   12/30/20  07:24    


 


RBC  4.50 10^6/uL (4.20-5.40)   12/30/20  07:24    


 


Hgb  12.4 g/dL (12.0-16.0)   12/30/20  07:24    


 


Hct  39.1 % (37.0-47.0)   12/30/20  07:24    


 


MCV  86.9 fL (81.0-99.0)   12/30/20  07:24    


 


MCH  27.6 pg (27.0-31.0)   12/30/20  07:24    


 


MCHC  31.7 g/dL (32.0-36.0)  L  12/30/20  07:24    


 


RDW  17.7 % (12.0-15.0)  H  12/30/20  07:24    


 


Plt Count  423 10^3/uL (130-450)   12/30/20  07:24    


 


MPV  12.7 fL (7.9-10.8)  H  12/30/20  07:24    


 


Neut # (Auto)  4.5 10^3/uL (1.5-6.6)   12/30/20  07:24    


 


Lymph # (Auto)  2.9 10^3/uL (1.5-3.5)   12/30/20  07:24    


 


Mono # (Auto)  1.2 10^3/uL (0.0-1.0)  H  12/30/20  07:24    


 


Eos # (Auto)  0.0 10^3/uL (0.0-0.7)   12/30/20  07:24    


 


Baso # (Auto)  0.0 10^3/uL (0.0-0.1)   12/30/20  07:24    


 


Absolute Nucleated RBC  0.05 x10^3/uL  12/30/20  07:24    


 


Total Counted  100   12/28/20  04:39    


 


Band Neuts % (Manual)  Not Reportable   12/30/20  07:24    


 


Abnorm Lymph % (Manual)  Not Reportable   12/30/20  07:24    


 


Myelocytes %  1 % (-0) H  12/28/20  04:39    


 


Nucleated RBC %  0.6 /100WBC  12/30/20  07:24    


 


Neutrophils # (Manual)  Not Reportable   12/30/20  07:24    


 


Lymphocytes # (Manual)  Not Reportable   12/30/20  07:24    


 


Monocytes # (Manual)  Not Reportable   12/30/20  07:24    


 


Eosinophils # (Manual)  Not Reportable   12/30/20  07:24    


 


Basophils # (Manual)  Not Reportable   12/30/20  07:24    


 


Differential Comment  MANUAL=AUTO DIFF   12/30/20  07:24    


 


Manual Slide Review  Indicated   12/29/20  09:21    


 


WBC Morphology  NORMAL APPEARANCE  (NORMAL)   12/28/20  04:39    


 


Platelet Estimate  INCREASED (>450,000)  (NORMAL)   12/30/20  07:24    


 


Platelet Morphology  PLATELET CLUMPING  (NORMAL)   12/30/20  07:24    


 


RBC Morph Micro Appear  NORMAL APPEARANCE  (NORMAL)   12/30/20  07:24    


 


VBG pH  7.346  (7.31-7.41)   12/26/20  19:46    


 


VBG pCO2  38.6 mmHg (41-51)  L  12/26/20  19:46    


 


VBG pO2  22.3 mmHg (25-47)  L  12/26/20  19:46    


 


VBG HCO3  20.6 mmol/L (23-28)  L  12/26/20  19:46    


 


VBG Total CO2  21.8 mmol/L (24-29)  L  12/26/20  19:46    


 


VBG O2 Saturation  33.8 % (60-80)  L  12/26/20  19:46    


 


VBG Base Excess  -4.6 mmol/L (-2 - +2)  L  12/26/20  19:46    


 


Sodium  137 mmol/L (135-145)   12/30/20  07:24    


 


Potassium  3.6 mmol/L (3.5-5.0)   12/30/20  07:24    


 


Chloride  108 mmol/L (101-111)   12/30/20  07:24    


 


Carbon Dioxide  22 mmol/L (21-32)   12/30/20  07:24    


 


Anion Gap  7.0  (6-13)   12/30/20  07:24    


 


BUN  < 5 mg/dL (6-20)  L  12/30/20  07:24    


 


Creatinine  0.8 mg/dL (0.4-1.0)   12/30/20  07:24    


 


Estimated GFR (MDRD)  83  (>89)  L  12/30/20  07:24    


 


Glucose  100 mg/dL ()   12/30/20  07:24    


 


Lactic Acid  1.8 mmol/L (0.5-2.2)   12/26/20  12:47    


 


Calcium  9.3 mg/dL (8.5-10.3)   12/30/20  07:24    


 


Magnesium  1.6 mg/dL (1.7-2.8)  L  12/30/20  07:24    


 


Total Bilirubin  0.6 mg/dL (0.2-1.0)   12/26/20  12:22    


 


AST  22 IU/L (10-42)   12/26/20  12:22    


 


ALT  < 10 IU/L (10-60)  L  12/26/20  12:22    


 


Alkaline Phosphatase  101 IU/L ()   12/26/20  12:22    


 


Ammonia  18.7 umol/L (7-35)   12/30/20  07:24    


 


Total Creatine Kinase  91 IU/L ()   12/26/20  15:20    


 


Total Protein  9.7 g/dL (6.7-8.2)  H  12/26/20  12:22    


 


Albumin  3.4 g/dL (3.2-5.5)   12/26/20  12:22    


 


Globulin  6.3 g/dL (2.1-4.2)  H  12/26/20  12:22    


 


Albumin/Globulin Ratio  0.5  (1.0-2.2)  L  12/26/20  12:22    


 


Lipase  37 U/L (22-51)   12/26/20  12:22    


 


Urine Color  YELLOW   12/29/20  11:20    


 


Urine Clarity  CLEAR  (CLEAR)   12/29/20  11:20    


 


Urine pH  7.5 PH (5.0-7.5)   12/29/20  11:20    


 


Ur Specific Gravity  1.015  (1.002-1.030)   12/29/20  11:20    


 


Urine Protein  NEGATIVE mg/dL (NEGATIVE)   12/29/20  11:20    


 


Urine Glucose (UA)  NEGATIVE mg/dL (NEGATIVE)   12/29/20  11:20    


 


Urine Ketones  NEGATIVE mg/dL (NEGATIVE)   12/29/20  11:20    


 


Urine Occult Blood  SMALL  (NEGATIVE)  H  12/29/20  11:20    


 


Urine Nitrite  NEGATIVE  (NEGATIVE)   12/29/20  11:20    


 


Urine Bilirubin  NEGATIVE  (NEGATIVE)   12/29/20  11:20    


 


Urine Urobilinogen  0.2 (NORMAL) E.U./dL (NORMAL)   12/29/20  11:20    


 


Ur Leukocyte Esterase  MODERATE  (NEGATIVE)  H  12/29/20  11:20    


 


Urine RBC  0-5 /HPF (0-5)   12/29/20  11:20    


 


Urine WBC  11-25 /HPF (0-5)  H  12/29/20  11:20    


 


Ur Squamous Epith Cells  RARE Squamous  (<= Few)   12/29/20  11:20    


 


Urine Bacteria  Moderate /HPF (None Seen)  H  12/29/20  11:20    


 


Ur Microscopic Review  INDICATED   12/27/20  07:30    


 


Urine Culture Comments  INDICATED   12/29/20  11:20    


 


Nasal Adenovirus (PCR)  NOT DETECTED   12/26/20  13:42    


 


Nasal B. parapertussis DNA (PCR)  NOT DETECTED   12/26/20  13:42    


 


Nasal Coronavir 229E PCR  NOT DETECTED   12/26/20  13:42    


 


Nasal Coronavir HKU1 PCR  NOT DETECTED   12/26/20  13:42    


 


Nasal Coronavir NL63 PCR  NOT DETECTED   12/26/20  13:42    


 


Nasal Coronavir OC43 PCR  NOT DETECTED   12/26/20  13:42    


 


Nasal Enterovir/Rhinovir PCR  NOT DETECTED   12/26/20  13:42    


 


Nasal Influenza B PCR  NOT DETECTED   12/26/20  13:42    


 


Nasal Influenza A PCR  NOT DETECTED   12/26/20  13:42    


 


Nasal Parainfluen 1 PCR  NOT DETECTED   12/26/20  13:42    


 


Nasal Parainfluen 2 PCR  NOT DETECTED   12/26/20  13:42    


 


Nasal Parainfluen 3 PCR  NOT DETECTED   12/26/20  13:42    


 


Nasal Parainfluen 4 PCR  NOT DETECTED   12/26/20  13:42    


 


Nasal RSV (PCR)  NOT DETECTED   12/26/20  13:42    


 


Nasal B.pertussis DNA PCR  NOT DETECTED   12/26/20  13:42    


 


Nasal C.pneumoniae (PCR)  NOT DETECTED   12/26/20  13:42    


 


Phil Human Metapneumo PCR  NOT DETECTED   12/26/20  13:42    


 


Nasal M.pneumoniae (PCR)  NOT DETECTED   12/26/20  13:42    


 


Nasal SARS-CoV-2 (PCR)  NOT DETECTED   12/26/20  13:42    


 


Urine Opiates Screen  NEGATIVE  (NEGATIVE)   12/26/20  22:00    


 


Ur Oxycodone Screen  NEGATIVE  (NEGATIVE)   12/26/20  22:00    


 


Urine Methadone Screen  NEGATIVE  (NEGATIVE)   12/26/20  22:00    


 


Ur Propoxyphene Screen  NEGATIVE  (NEGATIVE)   12/26/20  22:00    


 


Ur Barbiturates Screen  NEGATIVE  (NEGATIVE)   12/26/20  22:00    


 


Ur Tricyclics Screen  NEGATIVE  (NEGATIVE)   12/26/20  22:00    


 


Ur Phencyclidine Scrn  NEGATIVE  (NEGATIVE)   12/26/20  22:00    


 


Ur Amphetamine Screen  NEGATIVE  (NEGATIVE)   12/26/20  22:00    


 


U Methamphetamines Scrn  NEGATIVE  (NEGATIVE)   12/26/20  22:00    


 


U Benzodiazepines Scrn  POSITIVE  (NEGATIVE)  H  12/26/20  22:00    


 


Urine Cocaine Screen  NEGATIVE  (NEGATIVE)   12/26/20  22:00    


 


U Cannabinoids Screen  NEGATIVE  (NEGATIVE)   12/26/20  22:00    














- Procedures


Procedures: 





Procedures





CENTRAL VENOUS CATHETER PLACEMENT WITH GUIDANCE (08/31/15)


SPINAL TAP (08/31/15)

## 2020-12-31 LAB
ANION GAP SERPL CALCULATED.4IONS-SCNC: 7 MMOL/L (ref 6–13)
BASOPHILS NFR BLD AUTO: 0 10^3/UL (ref 0–0.1)
BASOPHILS NFR BLD AUTO: 0.3 %
BUN SERPL-MCNC: 5 MG/DL (ref 6–20)
CALCIUM UR-MCNC: 9.2 MG/DL (ref 8.5–10.3)
CHLORIDE SERPL-SCNC: 110 MMOL/L (ref 101–111)
CO2 SERPL-SCNC: 21 MMOL/L (ref 21–32)
CREAT SERPLBLD-SCNC: 0.7 MG/DL (ref 0.4–1)
EOSINOPHIL # BLD AUTO: 0 10^3/UL (ref 0–0.7)
EOSINOPHIL NFR BLD AUTO: 0.6 %
ERYTHROCYTE [DISTWIDTH] IN BLOOD BY AUTOMATED COUNT: 17.8 % (ref 12–15)
GLUCOSE SERPL-MCNC: 107 MG/DL (ref 70–100)
HGB UR QL STRIP: 11.2 G/DL (ref 12–16)
LYMPHOCYTES # SPEC AUTO: 2.5 10^3/UL (ref 1.5–3.5)
LYMPHOCYTES NFR BLD AUTO: 35.9 %
MAGNESIUM SERPL-MCNC: 1.6 MG/DL (ref 1.7–2.8)
MCH RBC QN AUTO: 27.2 PG (ref 27–31)
MCHC RBC AUTO-ENTMCNC: 30.4 G/DL (ref 32–36)
MCV RBC AUTO: 89.6 FL (ref 81–99)
MONOCYTES # BLD AUTO: 1.1 10^3/UL (ref 0–1)
MONOCYTES NFR BLD AUTO: 16.1 %
NEUTROPHILS # BLD AUTO: 3.3 10^3/UL (ref 1.5–6.6)
NEUTROPHILS # SNV AUTO: 7 X10^3/UL (ref 4.8–10.8)
NEUTROPHILS NFR BLD AUTO: 46.7 %
PDW BLD AUTO: 12.2 FL (ref 7.9–10.8)
PLAT MORPH BLD: (no result)
PLATELET # BLD: 545 10^3/UL (ref 130–450)
PLATELET BLD QL SMEAR: (no result)
RBC MAR: 4.12 10^6/UL (ref 4.2–5.4)
RBC MORPH BLD: (no result)

## 2020-12-31 RX ADMIN — PREDNISOLONE ACETATE SCH DROPS: 10 SUSPENSION/ DROPS OPHTHALMIC at 20:45

## 2020-12-31 RX ADMIN — ENOXAPARIN SODIUM SCH MG: 100 INJECTION SUBCUTANEOUS at 09:03

## 2020-12-31 RX ADMIN — FERROUS SULFATE TAB 325 MG (65 MG ELEMENTAL FE) SCH MG: 325 (65 FE) TAB at 09:02

## 2020-12-31 RX ADMIN — PREDNISOLONE ACETATE SCH DROPS: 10 SUSPENSION/ DROPS OPHTHALMIC at 09:03

## 2020-12-31 RX ADMIN — DOCUSATE SODIUM SCH MG: 250 CAPSULE, LIQUID FILLED ORAL at 09:02

## 2020-12-31 RX ADMIN — Medication SCH MCG: at 09:02

## 2020-12-31 RX ADMIN — SODIUM CHLORIDE, PRESERVATIVE FREE SCH: 5 INJECTION INTRAVENOUS at 08:24

## 2020-12-31 RX ADMIN — SODIUM CHLORIDE, PRESERVATIVE FREE SCH: 5 INJECTION INTRAVENOUS at 17:01

## 2020-12-31 RX ADMIN — SODIUM CHLORIDE, PRESERVATIVE FREE SCH: 5 INJECTION INTRAVENOUS at 23:58

## 2020-12-31 RX ADMIN — ASPIRIN SCH MG: 81 TABLET, COATED ORAL at 09:02

## 2020-12-31 RX ADMIN — DORZOLAMIDE HYDROCHLORIDE AND TIMOLOL MALEATE SCH DROPS: 20; 5 SOLUTION OPHTHALMIC at 09:03

## 2020-12-31 RX ADMIN — DORZOLAMIDE HYDROCHLORIDE AND TIMOLOL MALEATE SCH DROPS: 20; 5 SOLUTION OPHTHALMIC at 20:44

## 2020-12-31 RX ADMIN — DIVALPROEX SODIUM SCH MG: 125 TABLET, DELAYED RELEASE ORAL at 09:02

## 2020-12-31 RX ADMIN — VALPROIC ACID SCH MG: 250 SOLUTION ORAL at 20:46

## 2020-12-31 RX ADMIN — SODIUM CHLORIDE, PRESERVATIVE FREE SCH: 5 INJECTION INTRAVENOUS at 09:04

## 2020-12-31 RX ADMIN — SENNOSIDES SCH: 8.6 TABLET, FILM COATED ORAL at 09:03

## 2020-12-31 RX ADMIN — Medication SCH CAP: at 09:02

## 2020-12-31 NOTE — PROVIDER PROGRESS NOTE
Assessment/Plan





- Problem List


(1) FTT (failure to thrive) in adult


Assessment/Plan: 


She took less than 1 bite of her breakfast tray this morning, which was ordered 

to be a soft diet ever since yesterday, ever since her  reported she 

liked "chicken salad every day at 3 PM".


I spoke to the patient that if she wants to go home she must be able to take in 

nutrition.  She agreed.  I then asked her to show me how she eats.  The patient 

was offered food (the CNA brought a spoon with food to her mouth) and she 

refused everything, and would not even swallow 1 bite, repeating "No, no no" and

pushing the spoon away with her hands.


She also refused to swallow several of her meds yesterday and only got 

intermittent medications on her schedule.


Will stop the unnecessary p.o. meds like her statin. 


I will discuss her FTT with the , as to whether he wants a PEG tube 

placed or to transition her into Hospice care, since she has been awake for >3 

days enough to eat but is taking in inadequate nutrition or fluids.


Continue gentle IV hydration.








(2) Encephalopathy


Assessment/Plan: 


I now suspect that a UTI caused her presentation of altered mental status.  CT 

and MRI of the brain were negative for stroke.  Ammonia and TSH were 

unremarkable.  Doubt this was a medication reaction given she has been on 

baclofen and Lyrica for quite some time.  


She continues to have slow improvement back to her baseline.  She is able to 

answer (but only says Yes and No today) and can participate slightly with 

physical therapy.  Apparently, this similar presentation happened several months

ago, and she improved with seeing her  face-to-face.  Therefore, reques

nimesh exception of the visitor policy during Covid restrictions and allow him to 

be at her bedside.  


The   agrees that she is still not back at her baseline mental status.  

Today, I suspect she is also weak from FTT.


Continue treating the UTI, gentle IV hydration and will discuss her FTT with 

.








(3) E. coli UTI (urinary tract infection)


Assessment/Plan: 


When the WBC went up from normal to 12.9, another U/A was done and showed a UTI 

and culture was "indicated".  It is growing E coli. Today sens are back and it 

is pan-sensitive.


Her admission urinalysis did reveal many bacteria with nitrites and was a good 

sample.  Her altered mental status may have been her symptom of a UTI, 

therefore, I would not have called that asymptomatic bacteriuria.


IV Levaquin was started, since she could not take po reliably (was taking only 

10% of her tray), therefore oral Fosfomycin 1 dose was not chosen.


Continue Levaquin, the course will be finished tomorrow.








(4) Dementia


Assessment/Plan: 


She does have dementia at baseline but she was relatively functional.  She was 

normally oriented to self and people around her.  She did recognize it was 

[place] but her  stated she normally does not know the year or month.  


She is cooperating with PT.


Our Physical Therapist may recommend Home Health PT for after discharge.


It is unclear why she is on Divalproex but this was resumed today in case there 

is a history of seizures








(5) Malnutrition


Assessment/Plan: 


She is taking less than 10% of her tray now.  


The  told the nurse this is how much she usually eats at home as well, 

thereforte a Dietary consult for malnutrition screening and to give 

recommendations was requested and done yesterday.








(6) History of below-knee amputation of both lower extremities


Assessment/Plan: 


Stable.  Before this hospitalization, she could put her prosthetics on by 

herself.


Her bilateral prosthetics were put on by our staff and she could stand and 

pivot. 








(7) Glaucoma


Assessment/Plan: 


The  wanted to be assured that her eyedrops have been resumed, which they

have








(8) Hx of thrombocytosis


Assessment/Plan: 


As per Hx and seen initially or was from an infection.








(9) Hypomagnesemia


Assessment/Plan: 


Leading to malnutrition and failure to thrive.


We will replace








(10) Hypokalemia


Assessment/Plan: 


Resolved with K rider IV replacement.








- Current Meds


Current Meds: 





                               Current Medications











Generic Name Dose Route Start Last Admin





  Trade Name Freq  PRN Reason Stop Dose Admin


 


Aspirin  81 mg  12/28/20 09:00  12/31/20 09:02





  Aspirin Ec 81 Mg Tablet  PO   81 mg





  DAILY KATHY   Administration


 


Cholecalciferol  25 mcg  12/31/20 09:00  12/31/20 09:02





  Cholecalciferol 25 Mcg Tablet  PO   25 mcg





  DAILY KATHY   Administration


 


Divalproex Sodium  125 mg  12/30/20 21:00  12/31/20 09:02





  Divalproex Dr 125 Mg Tablet  PO   125 mg





  BID KATHY   Administration


 


Docusate Sodium  250 - 500 mg  12/29/20 09:00  12/31/20 09:02





  Docusate Sodium 250 Mg Capsule  PO   250 mg





  DAILY KATHY   Administration


 


Dorzolamide/Timolol  1 drops  12/28/20 12:00  12/31/20 09:03





  Dorzolamide/Timolol Ophth Drops  EACHEYE   1 drops





  BID KATHY   Administration


 


Enoxaparin Sodium  30 mg  12/28/20 09:00  12/31/20 09:03





  Enoxaparin 30 Mg/0.3 Ml Syringe  SUBQ   30 mg





  DAILY KATHY   Administration


 


Ferrous Sulfate  325 mg  12/29/20 09:00  12/31/20 09:02





  Ferrous Sulfate 325 Mg Tablet  PO   325 mg





  DAILY KATHY   Administration


 


Levofloxacin  500 mg in 100 mls @ 66.667 mls/hr  12/29/20 14:00  12/30/20 15:44





  Levaquin 500 Mg/100 Ml  IV  12/31/20 15:29  Infused





  Q24H KATHY   Infusion


 


Potassium Chloride/Dextrose/Sod Cl  1,000 mls @ 40 mls/hr  12/31/20 07:31  

12/31/20 08:24





  IV   40 mls/hr





  .Q25H KATHY   Administration


 


Lactobacillus Rhamnosus  1 cap  12/31/20 09:00  12/31/20 09:02





  Lactobacillus Rhamnosus Gg Capsule  PO   1 cap





  DAILY KATHY   Administration


 


Mineral Oil  1 applic  12/28/20 07:22  12/29/20 21:23





  Min Oil/Dimethicon/Coconut Oil 92 Gm Tube  TOP   1 applic





  PRN PRN   Administration





  Skin Care  


 


Polyethylene Glycol  17 gm  12/29/20 09:00  12/31/20 09:03





  Polyethylene Glycol 3350 17 Gm Packet  PO   Not Given





  DAILY KATHY  


 


Prednisolone  1 drops  12/28/20 12:00  12/31/20 09:03





  Prednisolone 1% Ophth Drops 75 Drops/5 Ml Bottle  RIGHTEYE   1 drops





  BID KATHY   Administration


 


Senna  8.6 - 17.2 mg  12/29/20 09:00  12/31/20 09:03





  Senna 8.6 Mg Tablet  PO   Not Given





  DAILY KATHY  


 


Sodium Chloride  10 ml  12/26/20 17:00  12/31/20 09:04





  Sodium Chloride Flush 0.9% 10 Ml Syringe  IVP   Not Given





  0100,0900,1700 KATHY  














- Lab Result


Fish Bone Diagrams: 


                                 12/31/20 05:32





                                 12/31/20 05:32





- Additional Planning


My Orders: 





My Active Orders





12/30/20 10:00


Dietary [Nutrition Consult] [CONS] Routine 





12/30/20 Lunch


DIET [Soft Mechanical Diet] [DIET] 





12/30/20 21:00


Divalproex Dr [Depakote Dr]   125 mg PO BID 





12/31/20 07:31


D5ns W/20 Meq KCl 1,000 ml IV 40 mls/hr 





12/31/20 09:00


Cholecalciferol [Vitamin D3]   25 mcg PO DAILY 


Lactobacillus Rhamnosus GG [Culturelle]   1 cap PO DAILY 





01/01/21 05:00


BMP - BASIC METABOLIC PANEL [CHEM] DAILYLAB 


CBC W/O DIFF (HEMOGRAM) [HEME] DAILYLAB 





01/02/21 05:00


BMP - BASIC METABOLIC PANEL [CHEM] DAILYLAB 














Subjective





- Subjective


Patient Reports: No Complaints, Other (Grunts and says yes and no only)





Objective


Vital Signs: 





                               Vital Signs - 24 hr











  12/30/20 12/30/20 12/30/20





  13:00 15:28 15:49


 


Temperature 37.1 C 36.9 C 


 


Heart Rate [ 68 78 





Brachial]   


 


Respiratory 19 16 





Rate   


 


Blood Pressure 114/89 H  133/97 H





[Right Brachial   





artery]   


 


Blood Pressure   





[Right Radial   





artery]   


 


O2 Saturation 97 97 














  12/30/20 12/30/20 12/31/20





  20:49 23:55 05:40


 


Temperature 36.8 C 36.9 C 


 


Heart Rate [ 77 77 





Brachial]   


 


Respiratory 20 17 





Rate   


 


Blood Pressure 149/77 H  163/83 H





[Right Brachial   





artery]   


 


Blood Pressure   





[Right Radial   





artery]   


 


O2 Saturation 96 96 














  12/31/20





  08:34


 


Temperature 36.4 C L


 


Heart Rate [ 76





Brachial] 


 


Respiratory 20





Rate 


 


Blood Pressure 





[Right Brachial 





artery] 


 


Blood Pressure 145/88 H





[Right Radial 





artery] 


 


O2 Saturation 96








                                     Oxygen











O2 Source [Without Activity]   Room air


 


O2 Source                      Room air














I&O (Last 24 Hrs): 





                          Intake and Output Totals x24h











 12/29/20 12/30/20 12/31/20





 23:59 23:59 23:59


 


Intake Total 1323.11 2191 15


 


Output Total 950 750 200


 


Balance 373.11 1441 -185











General: Alert


HEENT: Mucous membr. moist/pink, Other (R eye has a dense white cataract)


Neck: Supple


Neuro: Alert, Disoriented, Other (speech is only Yeah and No and grunts)


Cardiovascular: Regular rate, No murmurs


Respiratory: No respiratory distress, Breath sounds nml


Abdomen: Soft, No tenderness


Extremities: No edema





- Results


Results: 





                               Laboratory Results











WBC  7.0 x10^3/uL (4.8-10.8)   12/31/20  05:32    


 


RBC  4.12 10^6/uL (4.20-5.40)  L  12/31/20  05:32    


 


Hgb  11.2 g/dL (12.0-16.0)  L  12/31/20  05:32    


 


Hct  36.9 % (37.0-47.0)  L  12/31/20  05:32    


 


MCV  89.6 fL (81.0-99.0)   12/31/20  05:32    


 


MCH  27.2 pg (27.0-31.0)   12/31/20  05:32    


 


MCHC  30.4 g/dL (32.0-36.0)  L  12/31/20  05:32    


 


RDW  17.8 % (12.0-15.0)  H  12/31/20  05:32    


 


Plt Count  545 10^3/uL (130-450)  H  12/31/20  05:32    


 


MPV  12.2 fL (7.9-10.8)  H  12/31/20  05:32    


 


Neut # (Auto)  3.3 10^3/uL (1.5-6.6)   12/31/20  05:32    


 


Lymph # (Auto)  2.5 10^3/uL (1.5-3.5)   12/31/20  05:32    


 


Mono # (Auto)  1.1 10^3/uL (0.0-1.0)  H  12/31/20  05:32    


 


Eos # (Auto)  0.0 10^3/uL (0.0-0.7)   12/31/20  05:32    


 


Baso # (Auto)  0.0 10^3/uL (0.0-0.1)   12/31/20  05:32    


 


Absolute Nucleated RBC  0.04 x10^3/uL  12/31/20  05:32    


 


Total Counted  100   12/28/20  04:39    


 


Band Neuts % (Manual)  Not Reportable   12/30/20  07:24    


 


Abnorm Lymph % (Manual)  Not Reportable   12/30/20  07:24    


 


Myelocytes %  1 % (-0) H  12/28/20  04:39    


 


Nucleated RBC %  0.6 /100WBC  12/31/20  05:32    


 


Neutrophils # (Manual)  Not Reportable   12/30/20  07:24    


 


Lymphocytes # (Manual)  Not Reportable   12/30/20  07:24    


 


Monocytes # (Manual)  Not Reportable   12/30/20  07:24    


 


Eosinophils # (Manual)  Not Reportable   12/30/20  07:24    


 


Basophils # (Manual)  Not Reportable   12/30/20  07:24    


 


Differential Comment  MANUAL=AUTO DIFF   12/30/20  07:24    


 


Manual Slide Review  Indicated   12/31/20  05:32    


 


WBC Morphology  NORMAL APPEARANCE  (NORMAL)   12/28/20  04:39    


 


Platelet Estimate  INCREASED (>450,000)  (NORMAL)   12/31/20  05:32    


 


Platelet Morphology  1+ GIANT PLATELETS  (NORMAL)   12/31/20  05:32    


 


RBC Morph Micro Appear  NORMAL APPEARANCE  (NORMAL)   12/31/20  05:32    


 


VBG pH  7.346  (7.31-7.41)   12/26/20  19:46    


 


VBG pCO2  38.6 mmHg (41-51)  L  12/26/20  19:46    


 


VBG pO2  22.3 mmHg (25-47)  L  12/26/20  19:46    


 


VBG HCO3  20.6 mmol/L (23-28)  L  12/26/20  19:46    


 


VBG Total CO2  21.8 mmol/L (24-29)  L  12/26/20  19:46    


 


VBG O2 Saturation  33.8 % (60-80)  L  12/26/20  19:46    


 


VBG Base Excess  -4.6 mmol/L (-2 - +2)  L  12/26/20  19:46    


 


Sodium  138 mmol/L (135-145)   12/31/20  05:32    


 


Potassium  3.5 mmol/L (3.5-5.0)   12/31/20  05:32    


 


Chloride  110 mmol/L (101-111)   12/31/20  05:32    


 


Carbon Dioxide  21 mmol/L (21-32)   12/31/20  05:32    


 


Anion Gap  7.0  (6-13)   12/31/20  05:32    


 


BUN  5 mg/dL (6-20)  L  12/31/20  05:32    


 


Creatinine  0.7 mg/dL (0.4-1.0)   12/31/20  05:32    


 


Estimated GFR (MDRD)  97  (>89)   12/31/20  05:32    


 


Glucose  107 mg/dL ()  H  12/31/20  05:32    


 


Lactic Acid  1.8 mmol/L (0.5-2.2)   12/26/20  12:47    


 


Calcium  9.2 mg/dL (8.5-10.3)   12/31/20  05:32    


 


Magnesium  1.6 mg/dL (1.7-2.8)  L  12/31/20  05:32    


 


Total Bilirubin  0.6 mg/dL (0.2-1.0)   12/26/20  12:22    


 


AST  22 IU/L (10-42)   12/26/20  12:22    


 


ALT  < 10 IU/L (10-60)  L  12/26/20  12:22    


 


Alkaline Phosphatase  101 IU/L ()   12/26/20  12:22    


 


Ammonia  18.7 umol/L (7-35)   12/30/20  07:24    


 


Total Creatine Kinase  91 IU/L ()   12/26/20  15:20    


 


Total Protein  9.7 g/dL (6.7-8.2)  H  12/26/20  12:22    


 


Albumin  3.4 g/dL (3.2-5.5)   12/26/20  12:22    


 


Globulin  6.3 g/dL (2.1-4.2)  H  12/26/20  12:22    


 


Albumin/Globulin Ratio  0.5  (1.0-2.2)  L  12/26/20  12:22    


 


Lipase  37 U/L (22-51)   12/26/20  12:22    


 


Urine Color  YELLOW   12/29/20  11:20    


 


Urine Clarity  CLEAR  (CLEAR)   12/29/20  11:20    


 


Urine pH  7.5 PH (5.0-7.5)   12/29/20  11:20    


 


Ur Specific Gravity  1.015  (1.002-1.030)   12/29/20  11:20    


 


Urine Protein  NEGATIVE mg/dL (NEGATIVE)   12/29/20  11:20    


 


Urine Glucose (UA)  NEGATIVE mg/dL (NEGATIVE)   12/29/20  11:20    


 


Urine Ketones  NEGATIVE mg/dL (NEGATIVE)   12/29/20  11:20    


 


Urine Occult Blood  SMALL  (NEGATIVE)  H  12/29/20  11:20    


 


Urine Nitrite  NEGATIVE  (NEGATIVE)   12/29/20  11:20    


 


Urine Bilirubin  NEGATIVE  (NEGATIVE)   12/29/20  11:20    


 


Urine Urobilinogen  0.2 (NORMAL) E.U./dL (NORMAL)   12/29/20  11:20    


 


Ur Leukocyte Esterase  MODERATE  (NEGATIVE)  H  12/29/20  11:20    


 


Urine RBC  0-5 /HPF (0-5)   12/29/20  11:20    


 


Urine WBC  11-25 /HPF (0-5)  H  12/29/20  11:20    


 


Ur Squamous Epith Cells  RARE Squamous  (<= Few)   12/29/20  11:20    


 


Urine Bacteria  Moderate /HPF (None Seen)  H  12/29/20  11:20    


 


Ur Microscopic Review  INDICATED   12/27/20  07:30    


 


Urine Culture Comments  INDICATED   12/29/20  11:20    


 


Nasal Adenovirus (PCR)  NOT DETECTED   12/26/20  13:42    


 


Nasal B. parapertussis DNA (PCR)  NOT DETECTED   12/26/20  13:42    


 


Nasal Coronavir 229E PCR  NOT DETECTED   12/26/20  13:42    


 


Nasal Coronavir HKU1 PCR  NOT DETECTED   12/26/20  13:42    


 


Nasal Coronavir NL63 PCR  NOT DETECTED   12/26/20  13:42    


 


Nasal Coronavir OC43 PCR  NOT DETECTED   12/26/20  13:42    


 


Nasal Enterovir/Rhinovir PCR  NOT DETECTED   12/26/20  13:42    


 


Nasal Influenza B PCR  NOT DETECTED   12/26/20  13:42    


 


Nasal Influenza A PCR  NOT DETECTED   12/26/20  13:42    


 


Nasal Parainfluen 1 PCR  NOT DETECTED   12/26/20  13:42    


 


Nasal Parainfluen 2 PCR  NOT DETECTED   12/26/20  13:42    


 


Nasal Parainfluen 3 PCR  NOT DETECTED   12/26/20  13:42    


 


Nasal Parainfluen 4 PCR  NOT DETECTED   12/26/20  13:42    


 


Nasal RSV (PCR)  NOT DETECTED   12/26/20  13:42    


 


Nasal B.pertussis DNA PCR  NOT DETECTED   12/26/20  13:42    


 


Nasal C.pneumoniae (PCR)  NOT DETECTED   12/26/20  13:42    


 


Phil Human Metapneumo PCR  NOT DETECTED   12/26/20  13:42    


 


Nasal M.pneumoniae (PCR)  NOT DETECTED   12/26/20  13:42    


 


Nasal SARS-CoV-2 (PCR)  NOT DETECTED   12/26/20  13:42    


 


Urine Opiates Screen  NEGATIVE  (NEGATIVE)   12/26/20  22:00    


 


Ur Oxycodone Screen  NEGATIVE  (NEGATIVE)   12/26/20  22:00    


 


Urine Methadone Screen  NEGATIVE  (NEGATIVE)   12/26/20  22:00    


 


Ur Propoxyphene Screen  NEGATIVE  (NEGATIVE)   12/26/20  22:00    


 


Ur Barbiturates Screen  NEGATIVE  (NEGATIVE)   12/26/20  22:00    


 


Ur Tricyclics Screen  NEGATIVE  (NEGATIVE)   12/26/20  22:00    


 


Ur Phencyclidine Scrn  NEGATIVE  (NEGATIVE)   12/26/20  22:00    


 


Ur Amphetamine Screen  NEGATIVE  (NEGATIVE)   12/26/20  22:00    


 


U Methamphetamines Scrn  NEGATIVE  (NEGATIVE)   12/26/20  22:00    


 


U Benzodiazepines Scrn  POSITIVE  (NEGATIVE)  H  12/26/20  22:00    


 


Urine Cocaine Screen  NEGATIVE  (NEGATIVE)   12/26/20  22:00    


 


U Cannabinoids Screen  NEGATIVE  (NEGATIVE)   12/26/20  22:00    














- Procedures


Procedures: 





Procedures





CENTRAL VENOUS CATHETER PLACEMENT WITH GUIDANCE (08/31/15)


SPINAL TAP (08/31/15)

## 2020-12-31 NOTE — ADVANCE CARE PLANNING NOTE
Advance Care Planning





- Planning Encounter


Date: 20


Time: 11:30


Purpose: 





To discuss consent for a PEG feeding tube versus transition to Hospice in a 

patient with failure to thrive, in terms of nutrition and fluid intake.





Parties in Attendance: 





I spoke to the  at the patient's bedside.  Patient is in the room sitting

in her chair.  She responds only with "Yeah" and "No" and grunting.





Decisional Capacity of the Patient: 





She has severe dementia and does not have a decisional capacity.








- Diagnosis for Encounter


(1) FTT (failure to thrive) in adult


Summary: 


Since being awake enough to take in a diet (about 3 days), the patient has taken

only 25% of her tray, than 10% of her tray and today 0%, even with being fed 

one-to-one.  The  states that she was able to feed herself as recently as

earlier this month.





I then described to the  that the patient agrees to eat, says "Yeah" when

told that she needs to eat in order to go home, then she will push away the 

spoon or close her mouth and turn her head when being fed by our staff (which I 

witnessed today).





At this point, the  asked the patient if she would eat for him, and she 

told him "Yeah".  He took a spoon, took some of his tunafish salad from a 

sandwich she had bought, and began to spoon feed her.  She proceeded to say "No,

no, no" and turned her head away and pushed the spoon away gently, however he 

persisted and brought the spoon into her mouth which opened her lips and she 

then mouthed the food and slowly swallowed it.  He did this several times that I

witnessed, despite her complaint of "No, no no".  He then said to me that "her 

brain is saying no but it is the opposite of what her body wants", and "I've 

been feeding and taking care of her for a long time and I know what to do".





I motioned to the CNA (Gissell) to watch how he feeds her.  The Charge Nurse 

(Chantal), then reported to me that the  wants our staff to feed her like 

this and that TODD Maria would go and try, but basically he is force feeding her, 

which we are not to do.











(2) Dementia


Summary: 


As per Hx.











(3) History of below-knee amputation of both lower extremities


Summary: 


The  describes that earlier this month she could put on both leg 

prostheses on her own and can get up and ambulate on her own.  Since [place] 

he has had to help her put on the prostheses.  While here, she is not doing any 

of this and does not appear to even understand how to attach her prostheses.











- Encounter


Subjective/Patient's Story: 





Patient has dementia, bilateral BKA's, wears has bilateral leg prostheses.  

According to the , she could put these prostheses on by her own, is 

usually communicative with family and feeds herself.  For bathing, caregivers 

put her in a special chair, she can unlatch her prostheses herself and then turn

her body in that chair and raise her amputated limbs enough to be placed into 

the tub.  The  said that he and the caregivers then tell her to "take 

care of her private parts by herself".





The  wants to bring the patient home.  He would consider a nursing home 

if there are no other options for caregiving at home.





I brought up the option of her getting a PEG tube with fluids and pured food 

being placed into the stomach by syringe, usually 3 times a day.  He answered 

that he could not do this himself because he is ill with cancer, getting 

chemotherapy.  I asked if caregivers can help with force feeding her or feeding 

into a PEG tube with syringe, and he claims they can only be bath aides and help

with transfers.





The  is the meal preparer, and he even has a degree and is a .  He 

first stated that she can feed herself but then said that he has been taking 

care of her for a long time and knows how to feed her.  Then I saw him force 

feed her now (as described above).





Objective/Medical Story: 





She was admitted with altered mental status, had a normal head CT and brain MRI,

normal labs with normal lactic acid and white blood count normal except an 

abnormal urinalysis.  When the white blood count increased, after 2 days, the 

urinalysis was repeated and it is felt she did have a UTI and she is now being 

treated with iv antibiotics.  Her mental status, however has not improved 

significantly since day 2, when she became more awake with just IV fluids and 

was not yet getting antibiotics.





Goals of Care: 





The  wishes that she would at least return back to her usual level of 

function.


I indicated this might not be possible and he himself voiced that every time she

is sick, she comes home weaker.





Plan: 





He did not agree to a PEG tube.  He thinks that she can still be force fed, in 

order to get stronger.


I am concerned that she has to "take care of her private parts by herself" when 

she is in the bath tub, which is probably inadequate hygeine with her weakness 

and dementia and this leads to her recurrent UTIs.


I spoke to the CNA and to the Charge Nurse about the force-feeding.


I spoke to the  Jackie and the Discharge RN Katherine regarding more 

caregiving and help at home.


I will update her Palliative Care provider, Dorinda Tafoya, regarding this entire

interaction.





Additional Discussion: 





Patient qualifies for Home Health, which the  will accept, and will 

therefore order  for PT, OT, bath aide and RN to oversee what the pt does at h

ome.





Code Status: Do Not Attempt Resuscitation


Time spent on advance care plannin min

## 2020-12-31 NOTE — DISCHARGE PLAN
Discharge Plan


Problem Reviewed?: Yes


Disposition: 06 Home Health Service


Condition: Poor


Diet: Soft


Activity Restrictions: Activity as Tolerated


Shower Restrictions: No


Assistance Devices: Other (Prostheses)


Health Concerns: 


Patient was admitted here with marked weakness, and altered mental status.  We 

found it to be from a urinary tract infection.  The urine culture grew E. coli 

and she has completed a course of IV antibiotics for this while here.





Her alertness has improved slightly however, and she still needs to be fed, 

helped to stand and pivot and will need help with toileting.  She needs a bath 

aide to give her proper perineal cleaning and hygiene.





Home Health service has been ordered to come to the house, and she will have a 

visit from an RN (possibly just once), then after that home bath aide, PT and OT

in the home.





Plan of Treatment: 


As above.





Care Goals: 


Improvement in symptoms and stabilization are the goals.





Assessment: 


Instructions and reminders were sent home with the .





Additional Instructions or Follow Up instructions: 


If the patient has new or worsening symptoms, call her PCP or her Palliative 

Care provider, Dorinda Tafoya NP, for advice, or come to the ER.





No Smoking: If you smoke, Please STOP!  Call [phone] for help.


Follow-up with: 


Berna Patton ARNP, NP-C [Primary Care Provider] -

## 2021-01-01 VITALS — DIASTOLIC BLOOD PRESSURE: 98 MMHG | SYSTOLIC BLOOD PRESSURE: 176 MMHG

## 2021-01-01 LAB
ANION GAP SERPL CALCULATED.4IONS-SCNC: 11 MMOL/L (ref 6–13)
BUN SERPL-MCNC: 6 MG/DL (ref 6–20)
CALCIUM UR-MCNC: 9 MG/DL (ref 8.5–10.3)
CHLORIDE SERPL-SCNC: 104 MMOL/L (ref 101–111)
CO2 SERPL-SCNC: 20 MMOL/L (ref 21–32)
CREAT SERPLBLD-SCNC: 0.7 MG/DL (ref 0.4–1)
ERYTHROCYTE [DISTWIDTH] IN BLOOD BY AUTOMATED COUNT: 17.6 % (ref 12–15)
GLUCOSE SERPL-MCNC: 110 MG/DL (ref 70–100)
HGB UR QL STRIP: 11.8 G/DL (ref 12–16)
MCH RBC QN AUTO: 27.6 PG (ref 27–31)
MCHC RBC AUTO-ENTMCNC: 31.6 G/DL (ref 32–36)
MCV RBC AUTO: 87.1 FL (ref 81–99)
NEUTROPHILS # SNV AUTO: 7.8 X10^3/UL (ref 4.8–10.8)
PDW BLD AUTO: 12.1 FL (ref 7.9–10.8)
PLATELET # BLD: 633 10^3/UL (ref 130–450)
RBC MAR: 4.28 10^6/UL (ref 4.2–5.4)

## 2021-01-01 RX ADMIN — VALPROIC ACID SCH MG: 250 SOLUTION ORAL at 09:17

## 2021-01-01 RX ADMIN — Medication SCH CAP: at 09:17

## 2021-01-01 RX ADMIN — DOCUSATE SODIUM SCH MG: 250 CAPSULE, LIQUID FILLED ORAL at 09:16

## 2021-01-01 RX ADMIN — DORZOLAMIDE HYDROCHLORIDE AND TIMOLOL MALEATE SCH DROPS: 20; 5 SOLUTION OPHTHALMIC at 09:18

## 2021-01-01 RX ADMIN — ENOXAPARIN SODIUM SCH MG: 100 INJECTION SUBCUTANEOUS at 09:17

## 2021-01-01 RX ADMIN — SODIUM CHLORIDE, PRESERVATIVE FREE SCH ML: 5 INJECTION INTRAVENOUS at 09:18

## 2021-01-01 RX ADMIN — ASPIRIN SCH MG: 81 TABLET, COATED ORAL at 09:17

## 2021-01-01 RX ADMIN — Medication SCH MCG: at 09:17

## 2021-01-01 RX ADMIN — PREDNISOLONE ACETATE SCH DROPS: 10 SUSPENSION/ DROPS OPHTHALMIC at 09:18

## 2021-01-01 RX ADMIN — FERROUS SULFATE TAB 325 MG (65 MG ELEMENTAL FE) SCH MG: 325 (65 FE) TAB at 09:17

## 2021-01-01 RX ADMIN — SENNOSIDES SCH MG: 8.6 TABLET, FILM COATED ORAL at 09:17

## 2021-01-01 NOTE — DISCHARGE SUMMARY
Discharge Summary


Admit Date: 12/26/20


Discharge Date: 01/01/21


Discharging Provider: Fernanda Santamaria MD


Primary Care Provider: Berna Patton NP


Condition at Discharge: Fair


Discharge Disposition: 06 [facility]





[facility]


History of Present Illness: 





From the admission H&P of Dr. Prem Franklinsef:


This is an 80-year-old black female with a past medical history significant for 

dementia, GERD, peripheral vascular disease, bilateral BKAs, neuropathy who 

presents today after being found altered this morning by her .  The 

history is obtained from the patient's spouse as she is altered and unable to 

provide a history.  He states that she was in her usual state of health y

esterday evening and went to sleep feeling fine.  He states that at baseline she

is oriented to self, location.  She will occasionally know the year and month.  

She knew that yesterday was [place].  She recognizes all of her family 

members.  He states that she is normally relatively independent and will use her

prosthesis and a walker to get around.  He administers all of her medications to

her.  He states that yesterday evening he gave her baclofen which she takes as 

needed for neuropathy given her history of bilateral below the knee amputations.

 He states that this morning he woke up and found her to be minimally 

responsive.  He attempted to get her to the bedside commode but realized that 

would require more than 1 person and that she was not talking or verbalizing.  

He was concerned that she was having a stroke and so he called EMS.  He states 

she has not been sick recently she is not been complaining of any fevers or 

chills or abdominal pain.  He states she has vascular dementia.  He reports no 

prior history of strokes.  He states she never had seizures.  She has not had a 

similar presentation in the past.





In the emergency department, she was found to be minimally responsive but able 

to protect her airway.  She was found to be zana of her bilateral upper 

extremities.  She underwent a CT of the head which did not show any acute 

abnormalities.  Her labs were also unremarkable.  Given her ongoing altered 

status, the Hospitalist team was consulted for admission.





I did discuss goals of care with the patient spouse and he confirms that she is 

a DNR.








- HOSPITAL COURSE


Hospital Course: 


(1) E. coli UTI 


Her admission urinalysis did reveal bacteria and pos. nitrites. She had no fever

or elevated WBC.  The admitting Hospitalist did not believe a UTI was 

contributing to her encephalopathy as she was improving (very slightly) without 

antibiotics.  When the WBC went up from normal to 12.9, another U/A was done and

showed a UTI and culture was "indicated".  She was started on empiric iv 

Levofloxacin.  The urine culture quickly grew E coli which was pan-sensitive.  

She completed a course of iv antibiotics while here.





(2) Encephalopathy


 CT and MRI of the brain were negative for stroke.  COVID test was neg.  Ammonia

and TSH were unremarkable.  Doubted this was a medication reaction, since she 

has been on Baclofen and Lyrica for quite some time.  She got iv hydration.  We 

later suspected that the UTI did cause her presentation of altered mental 

status.  She had slow improvement in alertness, but was only able to answer 

"Yeah" and "No" and grunt.  She did participate slightly with physical therapy. 

Apparently, this similar presentation happened several months ago, and she i

mproved with seeing her  face-to-face.  Therefore, we requested exception

of the visitor policy during Covid restrictions and allowed him to visit at her 

bedside.  





(3) Dementia


She has significant dementia, only says "Yeah" and "No", but the description 

from her  was that she was relatively functional; at home she was able to

attach the leg prostheses by herself, could feed her self, was normally oriented

to self and family, he said.  A consult from her Palliative Care provider, 

Dorinda Tafoya NP was done.  The  reported that a caregiver and he would 

place her in a bath tub, but she was told to "wash her private parts".  This was

communicated to Dorinda Tafoya NP, since the patient may be too demented to 

accomplish her own perineal cleaning, and may be the reason for recurrent UTI's.

 A Home Health RN and bath aide were ordered at discharge. 





(4) FTT (failure to thrive) in adult


By the 3rd day, she was alert enough to swallow pills but could not feed 

herself.  She took only 10% of her tray and needed to be fed.  The  

described needing to feed her at home, which was not what he first reported 

about her independent function.  We discussed her FTT with the , as to 

whether he would want a PEG tube placed or to transition her into Hospice care. 

He wanted to proceed with oral feeding and her usual care at home.  He said he 

would consider putting her in a nursing home, when his death was imminent (he 

has cancer, undergoing chemotherapy).





(5) Malnutrition


She was taking 10% of her tray.  A Dietary consult for malnutrition screening 

and to give recommendations to the  was requested and done.  We stopped 

the unnecessary p.o. meds like her statin. 





(6) History of below-knee amputation of both lower extremities


Stable.  Before this hospitalization, she could apparently put her prosthetics 

on by herself.  Her bilateral prosthetics were put on by our staff while here, 

and she could stand and pivot.  Home Health PT and OT were ordered.





(7) Glaucoma


We continued her eyedrops while here.





(8) Hx of thrombocytosis


As per Hx and seen initially or was from this infection.





(9) Hypomagnesemia


Likely from malnutrition and failure to thrive.  It was replaced iv.





(10) Hypokalemia


Resolved with K rider IV replacement.








- ALLERGIES


Allergies/Adverse Reactions: 


                                    Allergies











Allergy/AdvReac Type Severity Reaction Status Date / Time


 


gentamicin [Gentamicin] Allergy  Unknown Verified 12/26/20 09:07


 


lisinopril Allergy  Unknown Verified 12/26/20 09:07


 


paroxetine [From Paxil] Allergy  Unknown Verified 12/26/20 09:07














- MEDICATIONS


Home Medications: 


                                Ambulatory Orders











 Medication  Instructions  Recorded  Confirmed


 


ALPRAZolam [Alprazolam] 0.25 mg PO Q4HR PRN MDD 4 DOSES OF 05/17/16 12/26/20





 0.25MG  


 


Esomeprazole Magnesium 40 mg PO QDAC 10/12/19 12/26/20


 


Mirtazapine 7.5 mg PO QPM 10/12/19 12/26/20


 


Aspirin [Aspirin EC] 81 mg PO DAILY 10/13/19 12/26/20


 


Cholecalciferol (Vitamin D3) 1,000 unit PO DAILY 03/23/20 12/26/20





[Vitamin D3]   


 


Ferrous Sulfate 325 mg PO DAILY 03/23/20 12/26/20


 


Dorzolamide HCl/Timolol Maleat 1 drops EACHEYE BID 05/12/20 12/26/20





[Dorzolamide-Timolol Eye Drops]   


 


prednisoLONE 1% OPHTH DROPS [Pred 1 drops RIGHTEYE BID 11/10/20 12/26/20





Forte 1% Ophth Drops]   


 


Divalproex Dr [Depakote Dr] 125 mg PO BID 12/26/20 12/26/20














- PHYSICAL EXAM AT DISCHARGE


General Appearance: positive: No acute distress, Alert


Eyes Bilateral: positive: EOMI, Other (Right eye has a dense white cataract and 

she is legally blind on R)


ENT: positive: ENT inspection nml, No signs of dehydration


Neck: positive: Nml inspection, No JVD


Respiratory: positive: No respiratory distress, Breath sounds nml


Cardiovascular: positive: Regular rate & rhythm, No murmur


Abdomen: positive: Non-tender, No distention


Skin: positive: Warm, Dry


Extremities: positive: No pedal edema, Other (She has mild arthritic deformity 

of the hands, the upper extremities are no longer contracted as they were at 

presentation.)


Neurologic/Psychiatric: positive: Disoriented to person, Disoriented to place, 

Disoriented to time, Other (She only speaks pacing "Yeah" or "No")





- LABS


Result Diagrams: 


                                 01/01/21 06:16





                                 01/01/21 06:16





- DIAGNOSTIC IMAGING


Diagnostic Imaging Results: Final report reviewed





- FOLLOW UP


Follow Up: 





See PCP in 1-2 weeks for hospital follow-up.








- TIME SPENT


Time Spent in Discharge (Minutes): 60

## 2021-01-07 ENCOUNTER — HOSPITAL ENCOUNTER (OUTPATIENT)
Dept: HOSPITAL 76 - PC | Age: 81
Discharge: HOME | End: 2021-01-07
Attending: NURSE PRACTITIONER
Payer: MEDICARE

## 2021-01-07 DIAGNOSIS — E87.6: ICD-10-CM

## 2021-01-07 DIAGNOSIS — R41.82: ICD-10-CM

## 2021-01-07 DIAGNOSIS — Z66: ICD-10-CM

## 2021-01-07 DIAGNOSIS — I73.9: ICD-10-CM

## 2021-01-07 DIAGNOSIS — E46: ICD-10-CM

## 2021-01-07 DIAGNOSIS — F01.50: ICD-10-CM

## 2021-01-07 DIAGNOSIS — Z89.511: ICD-10-CM

## 2021-01-07 DIAGNOSIS — Z51.5: Primary | ICD-10-CM

## 2021-01-07 DIAGNOSIS — Z89.512: ICD-10-CM

## 2021-01-07 PROCEDURE — 99350 HOME/RES VST EST HIGH MDM 60: CPT

## 2021-01-07 NOTE — CONSULTATION NOTE
Palliative Care Follow Up





- Referral


Referring Provider: Dr. Gonzalez Ingram


Time of Visit: 3565-0772


Referral setting: Home


Referral Reason: s/p Encephalopathy hospitalization/FTT/Dementia/Advanced Care 

Planning





- Information Sources


Records reviewed: Previous records reviewed


History/Review of Systems obtained from: Family


Exam limitations: Clinical condition (Advanced Dementia and does not wish to 

engage in conversation)





- History of Present Illness Update


Brief HPI Update: 





This is an 80-year-old -American female who was seen and evaluated within

her home with her /Geoff HUMPHREYS present for follow-up status post recent 

[facility], continued 

decreased oral intake and vascular dementia.





The patient was reportedly fine on [place] day engaging interactive with 

family phone calls and recognizing family as well as her .  She had a 

wonderful Christmas dinner.  Overnight, the patient was minimally responsive to 

her spouse and on 1226 he contacted EMS due to altered mental status.  She was 

admitted to [facility] from 12/26-1/1. Initially was believed

that she possibly could have had a drug interaction as the  

intermittently would administer baclofen for spasms typically once a month.  

However, this became less likely.  She had a CT scan of the head as well as MRI 

that.not show any acute abnormalities such as a CVA.  Her urine culture grew E. 

coli and she was treated with antibiotic therapy while inpatient.  Despite 

treatment, the patient is not near her previous baseline and her ability to feed

herself, bear weight, or engage.  She is presently mainly saying "yes" and "no" 

not always in appropriate context. When asked what the name of her  was 

she could not respond. 


She has had a history of phantom limb pain and previously was on pregabalin 50 

mg twice daily and this was discontinued during her hospital stay and not 

resumed upon discharge.  The patient denies pain at present and her spouse has 

not noticed any in pain evident upon return home.





This year she has had prior evaluations at the hospital.  Most recently August 28 when she came in for weakness with no acute findings and felt to be due to 

advancement of her underlying vascular dementia with encouragement of adequate 

oral hydration at home.  The patient did return to her baseline status.





Then, in May 2020 she did have altered mental status with no clear etiology to 

be identified such as CVA or UTI.  She was refusing care and medication while in

the hospital due to her advanced dementia was felt she would benefit from 

returning to her home setting and was discharged with home hospice services.  

However, upon admission to hospice the patient perked up and relayed to staff 

per the 's report "I am not ready to die."  Thus, hospice was not 

initiated in May 2020.





Unfortunately, the patient has not returned to her previous baseline status as 

she has done on subsequent occasions.  She was not engaged with home health 

physical therapy yesterday when they were in the home.  The patient's spouse 

sees "little by little she is improving."  Today, the patient was able to hold 

and drink juice from her cup when previously she has been requiring a port 

assistance since returning home.  She is only taking several mouthfuls of food 

during the day.  She was also able with a 2 person assist to transfer to the 

bedside commode today and defecate.  She is sleeping more throughout the day.





The patient is seen resting in bed, arousable but does not wish to stay engaged.

 No evidence of acute distress.


Past Medical History: 








Patient has a past medical history of vascular dementia, peripheral vascular 

disease, status post bilateral below the knee amputations, hyperlipidemia, 

coronary artery disease, asthma, GERD, hiatal hernia, chronic vision loss, left 

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 

osteomyelitis of the right foot in 2006, essential thrombocythemia TA mutation,

frequent UTIs.





Social History





- Living Situation


Living arrangement: At home


Living Situation: With spouse/s.o.


Support System: 





Patient and her  have been  for 59 years.  Prior to relocating to 

[facility] she and her  live in [place], [place].  She grew up 

in [place], [place].  The patient and her  have 2 children, 1 

daughter and 1 son.  The patient's healthcare power of  is her , 

Geoff at [phone].  The patient and her  are estranged from their 

daughter.  Their son, Zac resides in [place].





The patient has 2 private caregivers through rest care that come 5 days/week.  

The patient's spouse has connected with her , Radha Kate 

requesting for more caregiving hours.  Geoff is also getting assistance in the 

evenings from his neighbor Ara for toileting and feeding.  Also, the patient's

grandson, Hugo is settling into the area and is also available for 

assistance..





Medications/Allergies





- Medications


Home Medications: 


                                Ambulatory Orders











 Medication  Instructions  Recorded  Confirmed


 


ALPRAZolam [Alprazolam] 0.25 mg PO Q4HR PRN MDD 4 DOSES OF 05/17/16 01/07/21





 0.25MG  


 


Esomeprazole Magnesium 40 mg PO QDAC 10/12/19 01/07/21


 


Mirtazapine 7.5 mg PO QPM 10/12/19 01/07/21


 


Aspirin [Aspirin EC] 81 mg PO DAILY 10/13/19 01/07/21


 


Cholecalciferol (Vitamin D3) 1,000 unit PO DAILY 03/23/20 01/07/21





[Vitamin D3]   


 


Ferrous Sulfate 325 mg PO DAILY 03/23/20 01/07/21


 


Dorzolamide HCl/Timolol Maleat 1 drops EACHEYE BID 05/12/20 01/07/21





[Dorzolamide-Timolol Eye Drops]   


 


prednisoLONE 1% OPHTH DROPS [Pred 1 drops RIGHTEYE BID 11/10/20 01/07/21





Forte 1% Ophth Drops]   


 


Divalproex Dr [Depakote Dr] 125 mg PO BID 12/26/20 01/07/21


 


Potassium Chloride 20 meq PO DAILY 01/07/21 01/07/21














- Allergies


Allergies/Adverse Reactions: 


                                    Allergies











Allergy/AdvReac Type Severity Reaction Status Date / Time


 


gentamicin [Gentamicin] Allergy  Unknown Verified 12/26/20 09:07


 


lisinopril Allergy  Unknown Verified 12/26/20 09:07


 


paroxetine [From Paxil] Allergy  Unknown Verified 12/26/20 09:07














Review of Systems





- Constitutional


Constitutional: reports: Fatigue, Poor appetite (see HPI).  denies: Fever





- Eyes


Eyes: reports: Irritation (right eye), Vision loss





- Ears, Nose & Throat


Ears, Nose & Throat: denies: Nasal congestion





- Cardiovascular


Cardiovascular: denies: Edema





- Respiratory


Respiratory: denies: Cough





- Gastrointestinal


Gastrointestinal: reports: Poor appetite.  denies: Abdominal pain, Constipation,

Vomiting





- Genitourinary


Genitourinary: reports: Incontinence.  denies: Dysuria





- Musculoskeletal


Musculoskeletal: reports: Muscle weakness, Assistive devices, Transfer issues.  

denies: Joint pain





- Integumentary


Integumentary: denies: Rash





- Neurological


Neurological: reports: General weakness, Memory problems





- Psychiatric


Psychiatric: reports: Depression, Behavior disturbances (history of behaviorial 

disturbances)





- Hematologic/Lymphatic


Hematologic/Lymph: Other (history of frequent UTIs)





- All Other Systems


All Other Systems: reports: Reviewed and negative (Patient is a poor historian 

due to dementia and review of systems supplemented by the patient's 

/Geoff HUMPHREYS.)





Physical Exam





- Vital Signs


Temperature: 97.3 C


Pulse Rate: 79


O2 Saturation: 97 (on RA at rest)


Blood Pressure: 144/87 (left wrist cuff)





- Physical Exam


General Appearance: positive: No acute distress, Alert, Other (resting in bed in

robe with haircap in place)


Eyes Bilateral: negative: No lid inflammation


ENT: positive: No signs of dehydration


Neck: positive: Trachea midline


Cardiovascular: positive: Regular rate & rhythm, No murmur


Respiratory: positive: No respiratory distress, Breath sounds nml


Abdomen: positive: Non-tender, Soft, Nml bowel sounds, Other (bladder 

nondistended to palpation).  negative: Guarding


Skin: positive: No symptoms


Extremities: positive: No pedal edema, Other (bilateral below the knee 

amputations)


Neurologic/Psychiatric: positive: Disoriented to person, Disoriented to place, 

Disoriented to time, Weakness, Other (Unable to call her  by his name. 

Minimal words today--unlike her baseline. Stating "yes" and "no" not always 

appropriately to questions. Unable to follow simple commands such as squeezing 

provider's hand. Did not wish to be disturbed and called out "no" and pulled 

covers of the bed back up.)





Palliative Care





- POLST


Patient has POLST: Yes


POLST Status: DNR, Comfort Measures


Pain: No pain (no evidence on examination by facial ques and denied by patient 

and spouse.)


Drowsiness/Sedation: Moderate (4-6) (sleeping more during the day)


Nausea: None


Anorexia: Moderate (4-6) (drinking but only eating bites of food during the day)


Dyspnea: None


Sleep: Sleeps well


Constipation: No


Performance Status: 





Prior to hospitalization patient was able to self feed, Placed on her 

prostheses, stand and pivot to the bedside commode and was talkative and 

engaged.  Now, she is unable to follow commands, with minimal oral intake and up

until today required others to attempt to feed her.  She is having intermittent 

episodes of incontinence.  Remains continent of bowel. 





- Palliative Care


Discussion: 





At least in the last year, the patient has had 3 episodes of altered mental 

status and alertness and most recently has not bounced back to her previous 

baseline.  During her most recent hospitalization she was treated for a urinary 

tract infection with her urine microscopy growing E. coli.  There have was no 

evidence of underlying acute abnormality noted on CT head imaging or MRI.  

However, as the patient has not returned to her former baseline status and due 

to her underlying peripheral vascular disease it is not out of the realm of 

possibility that she may have sustained a potential TIA as this would not have 

been evident on imaging.  The patient though showing some small improvements 

that are evident to the spouse, given her lack of oral intake, inability to 

adequately follow commands are all indicative of her overall decline and leading

to protein calorie malnutrition.





The patient's spouse is devoted to caring for the patient.  In this marriage he 

finds it as his duty to care for his wife.  He finds it extremely difficult to 

be  from her.  He does recognize that the patient is not at her former 

baseline functionally or cognitively. The patient's spouse continues to have 

optimism that she may improve and relies on his gustavo as everything is "God's 

work." 





The patient also recognizes the pain increase in caregiving that the patient is 

requiring physically, and due to his underlying health problems and treatment Is

open to additional help.  He has assistance from [facility] caregivers and has 

requested additional help in hours, his neighbor, and now his grandson is local 

to also provide additional support.  In discussing and exploring potential 

respite stay this is not a feasible option for if the patient were to continue 

to deteriorate and her spouse was not able to be present this would be a 

detrimental loss to both he and her.  Ultimately, given this recent decline, the

patient's spouse wishes to focus on comfort and quality of life within the home 

setting to avoid disruption and unfamiliarity of a hospital setting or facility.

 POLST updated to reflect DN AR with comfort measures, antibiotic therapy only 

for symptom management and no artificial nutrition by tube.  Patient spouse now 

wishes to transfer to hospice services to be available within the home for added

support to optimize his time with the patient and her comfort level.





Results





- Lab Results


Lab results reviewed: Yes


Lab and Imaging Results: 





12/28/2020 Potassium 2.8; 1/1/2021 Potassium 3.3





Impression and Recommendations





- Palliative Care


Impression: 





This is an 80-year-old -American female Who has experienced acute 

functional and cognitive decline care, protein calorie malnutrition due to 

decreased oral intake and difficulty cooperating with care.  She is at risk for 

further decline and subsequent sequelae with underlying vascular dementia and 

peripheral vascular disease.  Lengthy goals of care discussion was had with the 

patient's spouse/DPOA today and he wishes to focus on the patient's quality of 

life and comfort within their home setting of a familiar environment and 

caregivers with assistance from hospice services within the home environment.


Recommendations/Counseling Done: 





1.Protein calorie malnutrition with decreased oral intake in the setting of 

advanced vascular dementia.  Patient continues with fluid intake but minimal 

oral foods.  Encourage the patient spouse to offer once, offer twice, and after 

the third time she continues to refuse then to let the offering rest and rehab 

address at a later time.  Requiring assistance during mealtimes from caregivers,

spouse and neighbor.  The patient spouse/DPOA does not wish for PEG tube 

placement for nutritional assistance and wishes to focus on pleasure feeds that 

is desirable to the patient with transition to hospice services for added layer 

of support with a focus on comfort.





2.  Altered mental status.  Patient with recent hospitalization from 12/26 to 

1/1/2021 without return to prior baseline function.  CT scan of the head and MRI

did not demonstrate evidence of CVA.  She was treated for a E. coli urinary 

tract infection during her hospitalization.  The patient has not made strong 

strides forward despite returning home to her familiar setting, spouse and 

familiar caregivers.  Given her significant peripheral vascular disease history 

is unclear if she potentially could have sustained a TIA and this was speculated

and reviewed with the patient's spouse today relying that this would not be 

evident on any brain imaging.  Also likely, the patient is demonstrating further

progression of her vascular dementia.  Supportive listening provided. 





3. Vascular dementia with a history of peripheral vascular disease.  Chronic.  

Progressive.  Fall precautions.  No reports of dysphagia.  Statin therapy was 

discontinued while inpatient.  Continue aspirin therapy as prescribed.  Continue

Depakote as ordered due to previous history of agitation.  Patient is not 

engaged in conversation desiring to sleep and is unable to follow commands 

indicative of further progression of her underlying dementia.  She is on no 

disease modifying agents.  Given the patient's advanced age and chronic 

comorbidities a gradual decline is expected.





4.  Hypokalemia.  Patient with noted hypokalemia during inpatient stay with 

discharge potassium level on 1/1/2021 as 3.3.  Recommended to 's to 

continue potassium chloride supplementation 20 mEq daily as the patient allows.





5.  Advanced care planning.  POL updated his DN AR with comfort measures.  The

patient's spouse is at risk for caregiver burden and he recognizes this and the 

need for self-care due to his own underlying health concerns.  Request has been 

made to the patient [facility]  for additional caregivers that are most 

specifically needed on the weekends.  At this time, the patient's spouse is 

having additional support from his grandson and his neighbor, Ara.  The 

patient's spouse is recognizing the patient's gradual decline and wishes to 

optimize his time and being in her presence which would not be affordable in a 

facility setting to be together.  Therefore, to optimize their time together, 

the patient's spouse has elected to proceed with hospice services to focus on 

comfort and quality of life.


Time Spent: 








Total time spent 105 minutes with greater than 50% of this spent in counseling 

and coordination of care with patient's spouse/DPOA and grandson; review of 

hospice philosophy; exploration of goals of care and caregiver burden; 

discussion of decline due to dementia and progressive course; examination of 

patient; supportive listening and anticipatory guidance.





Coordinated with hospice scheduling [facility] and patient scheduled for hospice

admission 1/14/2021 and request for rolling bedside table for utilization of 

patient.  Updated patient's PCP, Berna Patton and request for hospice referral

based on above.  Also notified patient's home health nurse and home health team 

that patient has pending hospice referral and admission 1/14/2021.





Disclaimer: The chart note was formulated using voice recognition technology and

unfortunately sound alike errors may occur.

## 2021-01-13 NOTE — EXTERNAL MEDICAL SUMMARY RPT
Continuity of Care Document

                           Created on:2021



Patient:SOL BUSH

Sex:Female

:1940

External Reference #:[id number]





Demographics







                          Phone                     Unavailable

 

                          Preferred Language        English

 

                          Marital Status            Unknown

 

                          Caodaism Affiliation     Unknown

 

                          Race                      Unknown

 

                          Ethnic Group              Unknown









Author







                          Organization              [facility]

 

                          [place] [address]

 

                          Phone                     [phone]









Support







                Name            Relationship    Address         Phone

 

                RETI            Unavailable     Unavailable     Unavailable









Care Team Providers







                    Name                Role                Phone

 

                    ARNP                Unavailable         Unavailable

 

                    [place]              Unavailable         Unavailable

 

                    [place]            Unavailable         Unavailable

 

                    MD                  Unavailable         Unavailable









Problems







                     date                description         facility

 

                     2020 00:00:00  Health-related behavior  [facility]

 Primary Care



                                                            [facility]

 

                     2020 00:00:00  Tobacco use and exposure  [facility]

 

                     2020 00:00:00  Exercise            [facility]

 

                     2020 00:00:00  Details of drug misuse behavior  [facility]

 

                     2020 00:00:00  Alcohol use         [facility]

 

                     2020 00:00:00  Tobacco smoking status NHIS [facility]

 

                     2020 00:00:00  Total score?        [facility]

 

                     2020 00:00:00  Former smoker       [facility]

 

                     2020 00:00:00  TSH WITH REFLEX TO FT4  [facility]

 

                     2020 00:00:00  LIPID SCREEN, FASTING  [facility]

 

                     2020 00:00:00  CBC W/Diff/Plt      [facility]

 

                     2020 00:00:00  COMPREHENSIVE METABOLIC PANEL  [facility]

 

                     2020 10:02    UNSP ESCHERICHIA COLI AS THE  [facility]



                                        CAUSE OF DISEASES CLA 

 

                     2020 10:02    ESSENTIAL (HEMORRHAGIC)  [facility]



                                        THROMBOCYTHEMIA     

 

                     2020 10:02    HYPOGLYCEMIA, UNSPECIFIED  [facility]

 

                     2020 10:02    UNSPECIFIED PROTEIN-CALORIE  [facility]



                                        MALNUTRITION        

 

                     2020 10:02    MAGNESIUM DEFICIENCY  [facility]

 

                     2020 10:02    HYPOKALEMIA         [facility]

 

                     2020 10:02    UNSPECIFIED DEMENTIA WITHOUT  [facility]



                                        BEHAVIORAL DISTURBANC 

 

                     2020 10:02    NICOTINE DEPENDENCE, CIGARETTES,  [facility]



                                        UNCOMPLICATED       

 

                     2020 10:02    ENCEPHALOPATHY, UNSPECIFIED  [facility]

 

                     2020 10:02    OTHER ENCEPHALOPATHY  [facility]

 

                     2020 10:02    UNSPECIFIED GLAUCOMA  [facility]

 

                     2020 10:02    UNQUALIFIED VISUAL LOSS, RIGHT  [facility]



                                        EYE, NORMAL VISION  

 

                     2020 10:02    ATHSCL HEART DISEASE OF NATIVE  [facility]



                                        CORONARY ARTERY W/O 

 

                     2020 10:02    PERIPHERAL VASCULAR DISEASE,  [facility]



                                        UNSPECIFIED         

 

                     2020 10:02    PRIMARY OSTEOARTHRITIS, RIGHT  [facility]



                                        HAND                

 

                     2020 10:02    PRIMARY OSTEOARTHRITIS, LEFT  [facility]



                                        HAND                

 

                     2020 10:02    URINARY TRACT INFECTION, SITE  [facility]



                                        NOT SPECIFIED       

 

                     2020 10:02    ALTERED MENTAL STATUS,  [facility]



                                        UNSPECIFIED         

 

                     2020 10:02    ADULT FAILURE TO THRIVE  [facility]

 

                     2020 10:02    CONTACT W AND EXPOSURE TO OTH  [facility]



                                        VIRAL COMMUNICABLE D 

 

                     2020 10:02    DO NOT RESUSCITATE  [facility]

 

                     2020 10:02    BODY MASS INDEX [BMI] 19.9 OR  [facility]



                                        LESS, ADULT         

 

                     2020 10:02    ACQUIRED ABSENCE OF RIGHT LEG  [facility]



                                        BELOW KNEE          

 

                     2020 10:02    ACQUIRED ABSENCE OF LEFT LEG  [facility]



                                        BELOW KNEE          







Allergies







                     date                description         facility

 

                                         NO KNOWN ENVIRONMENTAL ALLERGIES  [facility]

 

                                         CEPHALOSPORINS      [facility]

 

                                         NO KNOWN ALLERGIES  [facility]

 

                                         SULFA (SULFONAMIDE ANTIBIOTICS)  [facility]

 

                                         ADHESIVE            [facility]

 

                                         NO KNOWN ALLERGIES  [facility]

 

                                         SHELLFISH CONTAINING PRODUCTS  [facility]

 

                                         LIDOCAINE           [facility]

 

                                         SOAP                [facility]

 

                                         ERGOTAMINE          [facility]

 

                                         MEPERIDINE          [facility]

 

                                         HY[facility]OQUINE  [facility]

 

                                         MELOXICAM           [facility]

 

                                         ATORVASTATIN        [facility]

 

                                         ERYTHROMYCIN        [facility]

 

                                         INFLUENZA VACCINE TR-S 09 (PF)  [facility]

 

                                         lisinopril          [facility]

 

                                         paroxetine          [facility]

 

                                         gentamicin          [facility]

 

                                         PERTUSSIS VACCINE   [facility]

 

                                         PHENYTOIN SODIUM EXTENDED  [facility]

 

                                         CODEINE             [facility]

 

                                         IBUPROFEN           [facility]

 

                                         MELOXICAM           [facility]

 

                                         TRAMADOL            [facility]

 

                                         NO KNOWN ALLERGIES  [facility]







Medications







                     date                description         facility

 

                                         null                [facility]

 

                                         null                [facility]

 

                                         FENTANYL            [facility]

 

                                         FENTANYL            [facility]







Results





Social History







                     date                description         facility

 

                     2020 00:00:00  Former smoker       [facility]







Social History







                     date                description         facility

 

                     2020 00:00:00  Former smoker       [facility]









                     date                description         facility

 

                     [phone]

## 2021-04-10 ENCOUNTER — HOSPITAL ENCOUNTER (EMERGENCY)
Dept: HOSPITAL 76 - ED | Age: 81
Discharge: HOME | End: 2021-04-10
Payer: MEDICARE

## 2021-04-10 ENCOUNTER — HOSPITAL ENCOUNTER (OUTPATIENT)
Dept: HOSPITAL 76 - EMS | Age: 81
Discharge: ANOTHER HOSPITAL | End: 2021-04-10
Payer: MEDICARE

## 2021-04-10 VITALS — SYSTOLIC BLOOD PRESSURE: 147 MMHG | DIASTOLIC BLOOD PRESSURE: 86 MMHG

## 2021-04-10 DIAGNOSIS — Z89.512: ICD-10-CM

## 2021-04-10 DIAGNOSIS — Z89.511: ICD-10-CM

## 2021-04-10 DIAGNOSIS — F03.90: ICD-10-CM

## 2021-04-10 DIAGNOSIS — E86.0: Primary | ICD-10-CM

## 2021-04-10 DIAGNOSIS — R10.11: Primary | ICD-10-CM

## 2021-04-10 DIAGNOSIS — K59.00: ICD-10-CM

## 2021-04-10 LAB
ALBUMIN DIAFP-MCNC: 3.2 G/DL (ref 3.2–5.5)
ALBUMIN/GLOB SERPL: 0.6 {RATIO} (ref 1–2.2)
ALP SERPL-CCNC: 74 IU/L (ref 42–121)
ALT SERPL W P-5'-P-CCNC: < 10 IU/L (ref 10–60)
ANION GAP SERPL CALCULATED.4IONS-SCNC: 9 MMOL/L (ref 6–13)
AST SERPL W P-5'-P-CCNC: 22 IU/L (ref 10–42)
BASOPHILS # BLD MANUAL: 0 10^3/UL (ref 0–0.1)
BASOPHILS NFR BLD AUTO: 0.2 %
BILIRUB BLD-MCNC: 0.7 MG/DL (ref 0.2–1)
BUN SERPL-MCNC: 7 MG/DL (ref 6–20)
CALCIUM UR-MCNC: 9.3 MG/DL (ref 8.5–10.3)
CHLORIDE SERPL-SCNC: 108 MMOL/L (ref 101–111)
CO2 SERPL-SCNC: 22 MMOL/L (ref 21–32)
CREAT SERPLBLD-SCNC: 0.7 MG/DL (ref 0.4–1)
EOSINOPHIL # BLD MANUAL: 0 10^3/UL (ref 0–0.7)
EOSINOPHIL NFR BLD AUTO: 0.3 %
ERYTHROCYTE [DISTWIDTH] IN BLOOD BY AUTOMATED COUNT: 21.7 % (ref 12–15)
GFRSERPLBLD MDRD-ARVRAT: 97 ML/MIN/{1.73_M2} (ref 89–?)
GLOBULIN SER-MCNC: 5 G/DL (ref 2.1–4.2)
GLUCOSE SERPL-MCNC: 81 MG/DL (ref 70–100)
HCT VFR BLD AUTO: 35.4 % (ref 37–47)
HGB UR QL STRIP: 10.5 G/DL (ref 12–16)
LIPASE SERPL-CCNC: 21 U/L (ref 22–51)
LYMPH ABN NFR BLD MANUAL: 0 %
LYMPHOBLASTS # BLD: 61 %
LYMPHOCYTES # BLD MANUAL: 3.7 10^3/UL (ref 1.5–3.5)
LYMPHOCYTES NFR BLD AUTO: 58.4 %
MANUAL DIF COMMENT BLD-IMP: (no result)
MCH RBC QN AUTO: 26.4 PG (ref 27–31)
MCHC RBC AUTO-ENTMCNC: 29.7 G/DL (ref 32–36)
MCV RBC AUTO: 88.9 FL (ref 81–99)
MONOCYTES # BLD MANUAL: 0.4 10^3/UL (ref 0–1)
MONOCYTES NFR BLD AUTO: 10.1 %
NEUTROPHILS # SNV AUTO: 6.1 X10^3/UL (ref 4.8–10.8)
NEUTROPHILS NFR BLD AUTO: 30.7 %
NEUTROPHILS NFR BLD MANUAL: 2 10^3/UL (ref 1.5–6.6)
NEUTS BAND NFR BLD: 0 %
PDW BLD AUTO: 12.1 FL (ref 7.9–10.8)
PLAT MORPH BLD: (no result)
PLATELET # BLD: 511 10^3/UL (ref 130–450)
PLATELET BLD QL SMEAR: (no result)
POTASSIUM SERPL-SCNC: 3.1 MMOL/L (ref 3.5–5)
PROT SPEC-MCNC: 8.2 G/DL (ref 6.7–8.2)
RBC MAR: 3.98 10^6/UL (ref 4.2–5.4)
RBC MORPH BLD: (no result)
SODIUM SERPLBLD-SCNC: 139 MMOL/L (ref 135–145)
WBC MORPH BLD: (no result)

## 2021-04-10 PROCEDURE — 83690 ASSAY OF LIPASE: CPT

## 2021-04-10 PROCEDURE — 80053 COMPREHEN METABOLIC PANEL: CPT

## 2021-04-10 PROCEDURE — 36415 COLL VENOUS BLD VENIPUNCTURE: CPT

## 2021-04-10 PROCEDURE — 85025 COMPLETE CBC W/AUTO DIFF WBC: CPT

## 2021-04-10 PROCEDURE — 99284 EMERGENCY DEPT VISIT MOD MDM: CPT

## 2021-04-10 PROCEDURE — 96360 HYDRATION IV INFUSION INIT: CPT

## 2021-04-10 NOTE — XRAY REPORT
PROCEDURE:  Abdomen Acute

 

INDICATIONS:  Abdominal pain

 

TECHNIQUE:  One view chest and two views of the abdomen were acquired.  

 

COMPARISON:  12/28/2020

 

FINDINGS:  

 

Surgical changes and devices: Left upper quadrant surgical clips.

 

Chest:  Lungs are clear.  Heart size is normal.  No pleural effusions.  No pneumoperitoneum.  

 

Abdomen:  Bowel gas pattern is normal.  Visualized solid organ contours appear normal. Unchanged medi
al left upper quadrant calcification likely representing calcified splenic artery aneurysm.

 

Bones: No acute osseous lesion identified. Degenerative changes and scoliosis in the thoracic and lum
bar spine noted.

 

IMPRESSION:  No acute process. No significant change from pulmonary report.

 

Reviewed by: Jurgen Mahan MD on 4/10/2021 7:47 AM PDT

Approved by: Jurgen Mahan MD on 4/10/2021 7:47 AM PDT

 

 

Station ID:  [id number]

## 2021-04-10 NOTE — ED PHYSICIAN DOCUMENTATION
PD HPI ABD PAIN





- Stated complaint


Stated Complaint: ABD PX





- Chief complaint


Chief Complaint: Abd Pain





- History obtained from


History obtained from: Patient, Family





- History of Present Illness


Timing - onset: Today


Timing - duration: Minutes


Timing - details: Abrupt onset, Now resolved


Quality: Cramping, Sharp, Pain


Location: All over / everywhere


Worsened by: Moving, Position, Palpation


Associated symptoms: Constipation.  No: Fever, Nausea, Vomiting, Hematemesis, 

Diarrhea


Similar symptoms before: Has not had sx before


Recently seen: Other (recently discharged from hospice)





Review of Systems


Constitutional: denies: Fever


Nose: denies: Congestion


Throat: denies: Sore throat


Cardiac: denies: Chest pain / pressure


Respiratory: denies: Dyspnea, Cough


GI: reports: Abdominal Pain, Constipation.  denies: Nausea, Vomiting


: reports: Frequency (not changed).  denies: Dysuria


Skin: denies: Rash, Lesions


Musculoskeletal: denies: Neck pain, Back pain, Extremity pain, Extremity 

swelling


Neurologic: reports: Confused (as usual).  denies: Generalized weakness, Focal 

weakness, Numbness





PD PAST MEDICAL HISTORY





- Past Medical History


Past Medical History: Yes


Cardiovascular: High cholesterol, Coronary artery disease, Peripheral Vascular 

Disease


Respiratory: Asthma


Neuro: Dementia


Endocrine/Autoimmune: None


GI: GERD, Hiatal hernia


GYN: Fibroids


: None


HEENT: Chronic vision loss, Glaucoma


Psych: Depression, Anxiety


Musculoskeletal: Osteoporosis


Derm: None





- Past Surgical History


Past Surgical History: Yes


General: Appendectomy, Splenectomy


Ortho: Amputation


/GYN: Hysterectomy


Cardiovascular: Angioplasty, Other





- Present Medications


Home Medications: 


                                Ambulatory Orders











 Medication  Instructions  Recorded  Confirmed


 


ALPRAZolam [Alprazolam] 0.25 mg PO Q4HR PRN MDD 4 DOSES OF 05/17/16 04/10/21





 0.25MG  


 


Esomeprazole Magnesium 40 mg PO QDAC 10/12/19 04/10/21


 


Mirtazapine 7.5 mg PO QPM 10/12/19 04/10/21


 


Aspirin [Aspirin EC] 81 mg PO DAILY 10/13/19 04/10/21


 


Dorzolamide HCl/Timolol Maleat 1 drops EACHEYE BID 05/12/20 04/10/21





[Dorzolamide-Timolol Eye Drops]   


 


prednisoLONE 1% OPHTH DROPS [Pred 1 drops RIGHTEYE BID 11/10/20 04/10/21





Forte 1% Ophth Drops]   


 


Divalproex Dr [Depakote Dr] 125 mg PO BID 12/26/20 04/10/21














- Allergies


Allergies/Adverse Reactions: 


                                    Allergies











Allergy/AdvReac Type Severity Reaction Status Date / Time


 


gentamicin [Gentamicin] Allergy  Unknown Verified 04/10/21 05:08


 


lisinopril Allergy  Unknown Verified 04/10/21 05:08


 


paroxetine [From Paxil] Allergy  Unknown Verified 04/10/21 05:08














- Social History


Does the pt smoke?: No


Smoking Status: Never smoker


Does the pt drink ETOH?: No


Does the pt have substance abuse?: No





- Immunizations


Immunizations are current?: Yes





- POLST


Patient has POLST: Yes


POLST Status: DNR





PD ED PE NORMAL





- Vitals


Vital signs reviewed: Yes (hypertnesive )





- General


General: No acute distress, Well developed/nourished





- HEENT


HEENT: Atraumatic, PERRL, EOMI





- Neck


Neck: Supple, no meningeal sign





- Respiratory


Respiratory: No respiratory distress





- Abdomen


Abdomen: Normal bowel sounds, Soft, Non tender, Non distended, No organomegaly





- Back


Back: No CVA TTP, No spinal TTP





- Derm


Derm: Normal color, Warm and dry, No rash





- Extremities


Extremities: No edema, Other (bilateral BKA)





- Neuro


Neuro: CNs 2-12 intact, No motor deficit, No sensory deficit, Normal speech


Eye Opening: Spontaneous


Motor: Obeys Commands


Verbal: Confused


GCS Score: 14





- Psych


Psych: Normal mood, Normal affect





Results





- Vitals


Vitals: 


                               Vital Signs - 24 hr











  04/10/21 04/10/21 04/10/21





  04:55 05:00 05:15


 


Temperature 36.8 C 36.8 C 


 


Heart Rate 76 76 76


 


Respiratory 16 16 18





Rate   


 


Blood Pressure 143/79 H 143/79 H 139/86 H


 


O2 Saturation 91 L 91 L 91 L














  04/10/21 04/10/21 04/10/21





  05:16 06:29 08:20


 


Temperature  36.1 C L 36 C L


 


Heart Rate 73 77 69


 


Respiratory 16 16 18





Rate   


 


Blood Pressure 139/86 H 120/78 130/85 H


 


O2 Saturation 98 97 100














  04/10/21 04/10/21





  08:40 10:15


 


Temperature 37.6 C 


 


Heart Rate 90 61


 


Respiratory 20 18





Rate  


 


Blood Pressure 119/52 L 147/86 H


 


O2 Saturation 97 99








                                     Oxygen











O2 Source []                   Room air


 


O2 Source                      Room air


 


Oxygen Flow Rate               2

















- Labs


Labs: 


                                Laboratory Tests











  04/10/21 04/10/21





  06:13 06:13


 


WBC  6.1 


 


RBC  3.98 L 


 


Hgb  10.5 L 


 


Hct  35.4 L 


 


MCV  88.9 


 


MCH  26.4 L 


 


MCHC  29.7 L 


 


RDW  21.7 H 


 


Plt Count  511 H 


 


MPV  12.1 H 


 


Neut # (Auto)  Not Reportable 


 


Lymph # (Auto)  Not Reportable 


 


Mono # (Auto)  Not Reportable 


 


Eos # (Auto)  Not Reportable 


 


Baso # (Auto)  Not Reportable 


 


Absolute Nucleated RBC  Not Reportable 


 


Total Counted  100 


 


Band Neuts % (Manual)  0 


 


Abnorm Lymph % (Manual)  0 


 


Nucleated RBC %  Not Reportable 


 


Neutrophils # (Manual)  2.0 


 


Lymphocytes # (Manual)  3.7 H 


 


Monocytes # (Manual)  0.4 


 


Eosinophils # (Manual)  0.0 


 


Basophils # (Manual)  0.0 


 


Differential Comment  MANUAL DIFFERENTIAL 


 


WBC Morphology  NORMAL APPEARANCE 


 


Platelet Estimate  INCREASED (>450,000) 


 


Platelet Morphology  NORMAL APPEARANCE 


 


RBC Morph Micro Appear  2+  ANISOCYTOSIS 


 


Sodium   139


 


Potassium   3.1 L


 


Chloride   108


 


Carbon Dioxide   22


 


Anion Gap   9.0


 


BUN   7


 


Creatinine   0.7


 


Estimated GFR (MDRD)   97


 


Glucose   81


 


Calcium   9.3


 


Total Bilirubin   0.7


 


AST   22


 


ALT   < 10 L


 


Alkaline Phosphatase   74


 


Total Protein   8.2


 


Albumin   3.2


 


Globulin   5.0 H


 


Albumin/Globulin Ratio   0.6 L


 


Lipase   21 L














- Rads (name of study)


  ** abd series


Radiology: Prelim report reviewed (Impression: No acute process in the abdomen 

or chest.), EMP read indepedently, See rad report





Procedures





- IVC sono (time)


  ** 0650


Bedside IVC sono: IVC measures (cm) (0.92), IVC collapsed c insp (cm) 

(complete), Dehydration (est 1-2 liter deficit)





PD MEDICAL DECISION MAKING





- ED course


Complexity details: reviewed old records, reviewed results, re-evaluated ayanna tong, considered differential, d/w patient, d/w family


ED course: 





81-year-old female with advanced dementia who has had a recent improvement in 

her overall health status has been taken off of hospice. She has developed some 

abdominal pain this morning that is now resolved and she has not had a bowel 

movement in 2 days.  She does not appear to be obstructed on a plain film and an

 enema is administered. She has some results of this, she does not develop 

recurrence of her abdominal pain. She is found to be dehydrated on interrogation

 of the IVC. After discussion with the patients  regarding IV hydration 

he would like to proceed with this. I did discuss with him the rapid decline 

expected with excessive dehydration and recommended he encourage fluids. She is 

interactive with him and his continued . She has had improvement and 

has been taken off of hospice. Hydration is done with one liter of saline. 





Departure





- Departure


Disposition: 01 Home, Self Care


Clinical Impression: 


 Dehydration





Constipation


Qualifiers:


 Constipation type: unspecified constipation type Qualified Code(s): K59.00 - 

Constipation, unspecified





Condition: Stable


Instructions:  ED Constipation, ED Dehydration


Follow-Up: 


Dorinda Tafoya ARNP, FNP-BC [Primary Care Provider] - 


Discharge Date/Time: 04/10/21 10:45

## 2021-04-26 ENCOUNTER — HOSPITAL ENCOUNTER (OUTPATIENT)
Dept: HOSPITAL 76 - LAB.WCP | Age: 81
Discharge: HOME | End: 2021-04-26
Attending: FAMILY MEDICINE
Payer: MEDICARE

## 2021-04-26 DIAGNOSIS — E46: ICD-10-CM

## 2021-04-26 DIAGNOSIS — E87.6: Primary | ICD-10-CM

## 2021-04-26 DIAGNOSIS — R62.7: ICD-10-CM

## 2021-04-26 LAB
ANION GAP SERPL CALCULATED.4IONS-SCNC: 10 MMOL/L (ref 6–13)
BASOPHILS NFR BLD AUTO: 0 10^3/UL (ref 0–0.1)
BASOPHILS NFR BLD AUTO: 0.2 %
BUN SERPL-MCNC: 7 MG/DL (ref 6–20)
CALCIUM UR-MCNC: 9.3 MG/DL (ref 8.5–10.3)
CHLORIDE SERPL-SCNC: 110 MMOL/L (ref 101–111)
CO2 SERPL-SCNC: 23 MMOL/L (ref 21–32)
CREAT SERPLBLD-SCNC: 0.8 MG/DL (ref 0.4–1)
EOSINOPHIL # BLD AUTO: 0 10^3/UL (ref 0–0.7)
EOSINOPHIL NFR BLD AUTO: 0.2 %
ERYTHROCYTE [DISTWIDTH] IN BLOOD BY AUTOMATED COUNT: 22.2 % (ref 12–15)
GFRSERPLBLD MDRD-ARVRAT: 83 ML/MIN/{1.73_M2} (ref 89–?)
GLUCOSE SERPL-MCNC: 89 MG/DL (ref 70–100)
HCT VFR BLD AUTO: 36 % (ref 37–47)
HGB UR QL STRIP: 10.4 G/DL (ref 12–16)
LYMPHOCYTES # SPEC AUTO: 3.5 10^3/UL (ref 1.5–3.5)
LYMPHOCYTES NFR BLD AUTO: 57.2 %
MAGNESIUM SERPL-MCNC: 2.2 MG/DL (ref 1.7–2.8)
MCH RBC QN AUTO: 25.9 PG (ref 27–31)
MCHC RBC AUTO-ENTMCNC: 28.9 G/DL (ref 32–36)
MCV RBC AUTO: 89.6 FL (ref 81–99)
MONOCYTES # BLD AUTO: 0.6 10^3/UL (ref 0–1)
MONOCYTES NFR BLD AUTO: 9.9 %
NEUTROPHILS # BLD AUTO: 2 10^3/UL (ref 1.5–6.6)
NEUTROPHILS # SNV AUTO: 6.1 X10^3/UL (ref 4.8–10.8)
NEUTROPHILS NFR BLD AUTO: 32.3 %
NRBC # BLD AUTO: 0.05 X10^3/UL
NRBC # BLD AUTO: 0.8 /100WBC
PDW BLD AUTO: 12.3 FL (ref 7.9–10.8)
PLAT MORPH BLD: (no result)
PLATELET # BLD: 496 10^3/UL (ref 130–450)
PLATELET BLD QL SMEAR: (no result)
POTASSIUM SERPL-SCNC: 3.2 MMOL/L (ref 3.5–5)
RBC MAR: 4.02 10^6/UL (ref 4.2–5.4)
RBC MORPH BLD: (no result)
SODIUM SERPLBLD-SCNC: 143 MMOL/L (ref 135–145)
T3FREE SERPL-MCNC: 2.36 PG/ML (ref 2.5–3.9)
T4 FREE SERPL-MCNC: 1.04 NG/DL (ref 0.58–1.64)
TSH SERPL-ACNC: 2.28 UIU/ML (ref 0.34–5.6)

## 2021-04-26 PROCEDURE — 83735 ASSAY OF MAGNESIUM: CPT

## 2021-04-26 PROCEDURE — 36415 COLL VENOUS BLD VENIPUNCTURE: CPT

## 2021-04-26 PROCEDURE — 80048 BASIC METABOLIC PNL TOTAL CA: CPT

## 2021-04-26 PROCEDURE — 84443 ASSAY THYROID STIM HORMONE: CPT

## 2021-04-26 PROCEDURE — 84439 ASSAY OF FREE THYROXINE: CPT

## 2021-04-26 PROCEDURE — 84481 FREE ASSAY (FT-3): CPT

## 2021-04-26 PROCEDURE — 85025 COMPLETE CBC W/AUTO DIFF WBC: CPT

## 2021-05-03 ENCOUNTER — HOSPITAL ENCOUNTER (OUTPATIENT)
Dept: HOSPITAL 76 - PC | Age: 81
Discharge: HOME | End: 2021-05-03
Attending: NURSE PRACTITIONER
Payer: MEDICARE

## 2021-05-03 DIAGNOSIS — F01.50: ICD-10-CM

## 2021-05-03 DIAGNOSIS — E87.6: ICD-10-CM

## 2021-05-03 DIAGNOSIS — K05.10: ICD-10-CM

## 2021-05-03 DIAGNOSIS — Z51.5: Primary | ICD-10-CM

## 2021-05-03 DIAGNOSIS — I73.9: ICD-10-CM

## 2021-05-03 DIAGNOSIS — E46: ICD-10-CM

## 2021-05-03 PROCEDURE — 99349 HOME/RES VST EST MOD MDM 40: CPT

## 2021-05-03 NOTE — CONSULTATION NOTE
Palliative Care Follow Up





- Referral


Referring Provider: Dr. Jair Cochran


Time of Visit: 8448-2423


Referral setting: Home


Referral Reason: Vascular Dementia/FTT





- Information Sources


Records reviewed: Previous records reviewed


History/Review of Systems obtained from: Patient, Family (spouse, Geoff)


Exam limitations: Clinical condition (Advanced Dementia)





- History of Present Illness Update


Brief HPI Update: 





This is an 81-year-old -American female who was seen and evaluated within

her home with her /DPOAGeoff present for reestablishment of care with 

palliative care services due to failure to thrive and vascular dementia.





The patient is well-known to this provider having been on palliative care 

services prior to her transition to [facility] care in early 

January 2021.  She has recently come off hospice as of early April 2021 and is 

reestablishing with palliative care services.





The patient has a longstanding history of vascular dementia with intermittent 

behavioral disturbances that is well controlled with alprazolam and Depakote.  

The patient previously resided at [facility] for approximately 2-1/2 

years before returning home with her spouse as her primary caregiver and there 

are 3 caregivers that come into the home to provide additional support for s

everal hours a day Monday through Saturday.





Her coming off of hospice services the patient presented to the emergency 

department on 4/10 due to abdominal pain in the setting of constipation.  The 

patient spouse has been administering MiraLAX and senna if needed to obtain a 

bowel movement.  Last bowel movement was yesterday with no reported straining.  

The patient is drinking regularly throughout the day: Pepsi, juice, and water.  

She is eating however, smaller amounts than previously reported.  She has lost 

approximately 13 pounds since December 2020.  The patient's spouse and 

caregivers are attempting to make a routine and having the patient sitting up 

for breakfast and dinner and not consuming her routine meals in bed.  She has 

not had any falls.





The patient's spouse laments that the hospital bed that was provided by hospice 

set up in the living room was a great source of liliana for the caregivers, spouse 

and family and were saddened to have this go.  However, the patient's spouse did

not want to rent by and the bed and therefore the patient has transitioned back 

to the bed that she shares with her spouse.





She has been battling gingivitis and when she was seen to establish care with 

her new PCP on 4/26 she was initiated on Augmentin until she got in to see you 

Dr. Dodson for her tooth this distraction.  She subsequently had 3 teeth pulled 

and Dr. Dodson placed her on a course of amoxicillin which she is still 

completing.  No reports of diarrhea or nausea.





The patient had recent lab work obtained on 4/26 and the patient spouse wish to 

have clarification regarding potassium chloride supplementation as the patient's

potassium levels was 3.2 on lab draw and she was reinitiated on potassium 

chloride 10 mEq once daily.  Discussed with the patient's spouse goal potassium 

level and would continue utilization of potassium chloride supplementation.





The patient spouse notes that the patient is sleeping more during the day.  He 

continues to note a general decline.  Due to her recent dental extraction the 

spouse is providing soft foods for consumption and the patient is not reporting 

any gum or dental discomfort.





The patient is seen resting in bed initially in gauged for answering simple 

questions and then drifting back off to sleep.  Well-groomed in her nightgown 

with no evidence of acute distress.


Past Medical History: 





Patient has a past medical history of vascular dementia, peripheral vascular 

disease, status post bilateral below the knee amputations, hyperlipidemia, 

coronary artery disease, asthma, GERD, hiatal hernia, chronic vision loss, right

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 

osteomyelitis of the right foot in 2006, essential thrombocythemia TA mutation,

frequent UTIs.











Social History





- Living Situation


Living arrangement: At home


Living Situation: With spouse/s.o.


Support System: 








Patient and her  have been  for 59 years.  Prior to relocating to 

[facility] she and her  live in [place], [place].  She grew up 

in [place], [place].  The patient and her  have 2 children, 1 

daughter and 1 son.  The patient's healthcare power of  is her , 

Geoff at [phone].  The patient and her  are estranged from their 

daughter.  Their son, Zac resides in [place].





The patient has 3 private caregivers through rest care that come 6 days/week.   

Her  is Radha Kate.





Medications/Allergies





- Medications


Home Medications: 


                                Ambulatory Orders











 Medication  Instructions  Recorded  Confirmed


 


ALPRAZolam [Alprazolam] 0.25 mg PO BID 05/17/16 04/10/21


 


Esomeprazole Magnesium 40 mg PO BID 10/12/19 05/04/21


 


Mirtazapine 7.5 mg PO QPM 10/12/19 05/04/21


 


Aspirin [Aspirin EC] 81 mg PO DAILY 10/13/19 05/04/21


 


Dorzolamide HCl/Timolol Maleat 1 drops EACHEYE BID 05/12/20 05/04/21





[Dorzolamide-Timolol Eye Drops]   


 


prednisoLONE 1% OPHTH DROPS [Pred 1 drops RIGHTEYE BID 11/10/20 05/04/21





Forte 1% Ophth Drops]   


 


Divalproex Dr [Depakote Dr] 125 mg PO BID 12/26/20 05/04/21


 


Liothyronine [Cytomel] 5 mcg PO DAILY 05/04/21 05/04/21


 


Potassium Chloride 10 meq PO DAILY 05/04/21 05/04/21


 


Senna [Senokot] 8.6 mg PO DAILY PRN 05/04/21 05/04/21


 


polyethylene glycoL 3350 [Miralax] 8.5 gm PO DAILY 05/04/21 05/04/21














- Allergies


Allergies/Adverse Reactions: 


                                    Allergies











Allergy/AdvReac Type Severity Reaction Status Date / Time


 


gentamicin [Gentamicin] Allergy  Unknown Verified 04/10/21 05:08


 


lisinopril Allergy  Unknown Verified 04/10/21 05:08


 


paroxetine [From Paxil] Allergy  Unknown Verified 04/10/21 05:08














Review of Systems





- Constitutional


Constitutional: reports: Fatigue, Poor appetite (see HPI), Weight loss (see 

HPI).  denies: Fever





- Eyes


Eyes: reports: Vision loss (right eye due to glaucoma)





- Ears, Nose & Throat


Ears, Nose & Throat: reports: Dental decay, Other (recently had 3 teeth 

extracted).  denies: Dentures





- Cardiovascular


Cardiovascular: denies: Chest pain, Edema





- Respiratory


Respiratory: denies: Cough





- Gastrointestinal


Gastrointestinal: reports: Constipation (improved, see HPI), Poor appetite.  

denies: Abdominal pain, Vomiting





- Genitourinary


Genitourinary: reports: Incontinence.  denies: Dysuria





- Musculoskeletal


Musculoskeletal: reports: Muscle weakness, Assistive devices, Transfer issues.  

denies: Joint pain





- Integumentary


Integumentary: denies: Rash





- Neurological


Neurological: reports: General weakness, Memory problems





- Psychiatric


Psychiatric: reports: Depression, Behavior disturbances (history of behaviorial 

disturbances)





- Endocrine


Endocrine: reports: Hypothyroidism





- Hematologic/Lymphatic


Hematologic/Lymph: reports: Other (history of frequent UTIs)





- All Other Systems


All Other Systems: reports: Reviewed and negative (Patient is a poor historian 

due to dementia and review of systems supplemented by the patient's 

/DPTAE Geoff.)





Physical Exam





- Vital Signs


Temperature: 36.5 C


Pulse Rate: 76


O2 Saturation: 96 (on RA)


Blood Pressure: 100/63 (left wrist)





- Physical Exam


General Appearance: positive: No acute distress, Alert, Other (resting in bed 

nightgown with haircap in place)


Eyes Bilateral: positive: Other (scarring to right cornea due to glaucoma)


ENT: positive: No signs of dehydration, Other (poor dentition. Would not allow 

for adequate inspection of gums by following commands for opening her mouth.)


Neck: positive: Trachea midline.  negative: Lymphadenopathy (R), Lymphadenopathy

(L)


Cardiovascular: positive: Regular rate & rhythm, No murmur


Respiratory: positive: No respiratory distress, Breath sounds nml


Abdomen: positive: Non-tender, Soft, Nml bowel sounds


Skin: positive: No symptoms


Extremities: positive: No pedal edema, Other (bilateral below the knee 

amputations)


Neurologic/Psychiatric: positive: Disoriented to person, Disoriented to place, 

Disoriented to time, Weakness, Other (responds when directly addressed with 

short responses)





Palliative Care





- POLST


Patient has POLST: Yes


POLST Status: DNR, Comfort Measures


Pain: No pain


Performance Status: 





Patient is able to self feed, placed on her prostheses with some assistance and 

stand and pivot to her bedside commode or chair next to the bedside.  She is a 

decreased oral intake overall with weight loss.  Some of this may be 

contributory due to her poor dentition and gingivitis with recent tooth 

extractions.  Intermittent episodes of in continence with urine and remains 

continent of bowel.





- Palliative Care


Discussion: 





The patient has recently come off of hospice services as of early April 2021 and

continues to have a decreased overall oral intake and an approximately 13 pound 

weight loss since December 2020.  The patient spouse recognizes the patient has 

a continued gradual decline due to her underlying vascular dementia and provided

supportive listening today and normalization of his feelings. The patient spouse

continues to be her primary caregiver with an added layer of support of 

caregivers several hours a day 6 days/week.  The patient's spouse is presently 

undergoing treatment for MDS and his primary focus is to remain healthy to be 

able to care for the patient and himself within their home setting.  He 

ultimately, would prefer the patient not to have to return to another facility 

and if her care needs increased he would be open to having her transition to 

[facility] as [place]





Results





- Lab Results


Lab results reviewed: Yes


Lab and Imaging Results: 





4/26/2021





Sodium 143, potassium 3.2, BUN 7, creatinine 0.8, GFR 83, glucose 79, magnesium 

2.2 TSH 2.28, free T3 2.36, free T4 1.04





Impression and Recommendations





- Palliative Care


Impression: 





This is an 81-year-old -American female who continues to have a gradual, 

functional decline due to her underlying vascular dementia and peripheral 

vascular disease.  She is recently graduated from [facility].  The 

patient's spouse continues to wish to focus on quality of life and comfort with 

in their home setting with the patient's spouse is her primary caregiver.  

Palliative care to continue to provide support for symptom management, care 

coordination, anticipatory guidance and a transition to hospice services again 

when appropriate.


Recommendations/Counseling Done: 





1.Protein calorie malnutrition in the setting of advanced vascular dementia.  

The patient has a decrease in overall oral intake but is consuming typically 2 

meals daily snacking in between.  Continue to encourage the spouse to provide 

any items that the patient is interested in that are presently soft in 

presentation due to her recent tooth extraction.  Continue to encourage sitting 

up during mealtimes to E's potential for early satiety.  Continue to monitor.  

Left MAC obtained today as 20.5 cm.





2.  Severe gingivitis with recent tooth extraction.  The patient was started on 

Augmentin by her PCP and this was subsequently stopped by the patient's spouse 

when she saw her dentist, Dr. Dodson who performed 3 tooth extractions and 

initiated her on amoxicillin that needed clarification for the patient's spouse.

 Advised, would complete amoxicillin as prescribed by the patient's dentist and 

would remain off of Augmentin at this point in time.  Difficult to perform an 

adequate assessment of the patient's oral mouth today as she was resistant to 

opening due to her underlying dementia.  Follow-up with dentist as previously 

advised.





3.  Vascular dementia with a history of peripheral vascular disease.  Chronic.  

Progressive.  Fall precautions.  No reports of dysphagia.  Continue aspirin 

therapy as prescribed.  Continue Depakote as ordered due to previous history of 

agitation as well as open alprazolam.  She is on no disease modifying agents.  

Given the patient's advanced age and chronic comorbidities a gradual Mcguire is 

expected.





4.  Hypokalemia.  Patient with potassium level of 3.2 performed on 4/26/2021 

reviewed with the patient's spouse today and went over administration of 

potassium chloride supplementation 10 mEq daily and assured spouse may provide 

administration for low potassium and would continue this moving forward.





5.  Advanced care planning.  Patient recently graduated from hospice services.  

The patient's spouse relays some fears that the patient may not be able to go 

back on hospice services in the future as she is graduated and provided 

supportive listening by this ARNP with reassurance that if the patient met 

hospice criteria she may have an additional hospice benefit inthe future.  This 

provided relief to the patient's spouse.  Ultimately, the patient's spouse 

wishes for the patient to be present in their home setting with them staying 

together for as long as possible.  If there came a time that they needed to 

transition the patient to a higher level of care and they would look only [place] with the only offering being [facility] however, the patient's

spouse wishes for the patient to be present at home for as long as possible.  

The patient and his spouse which to optimize their time together.





Total time spent 45 minutes with greater than 50% of the spent in counseling and

coordination of care with the patient's spouse/Geoff HUMPHREYS; review of 

palliative care and hospice philosophy as well as hospice benefits; supportive 

listening; examination of the patient; review of medications; care coordination 

and anticipatory guidance.





Disclaimer: The chart note was formulated using voice recognition technology and

unfortunately sound alike errors may occur.

## 2021-05-21 ENCOUNTER — HOSPITAL ENCOUNTER (OUTPATIENT)
Dept: HOSPITAL 76 - PC | Age: 81
Discharge: HOME | End: 2021-05-21
Attending: NURSE PRACTITIONER
Payer: MEDICARE

## 2021-05-21 DIAGNOSIS — E46: ICD-10-CM

## 2021-05-21 DIAGNOSIS — Z51.5: Primary | ICD-10-CM

## 2021-05-21 DIAGNOSIS — Z89.511: ICD-10-CM

## 2021-05-21 DIAGNOSIS — Z79.82: ICD-10-CM

## 2021-05-21 DIAGNOSIS — Z66: ICD-10-CM

## 2021-05-21 DIAGNOSIS — L89.322: ICD-10-CM

## 2021-05-21 DIAGNOSIS — G54.6: ICD-10-CM

## 2021-05-21 DIAGNOSIS — R62.7: ICD-10-CM

## 2021-05-21 DIAGNOSIS — K05.10: ICD-10-CM

## 2021-05-21 DIAGNOSIS — Z79.899: ICD-10-CM

## 2021-05-21 DIAGNOSIS — I73.9: ICD-10-CM

## 2021-05-21 DIAGNOSIS — F01.51: ICD-10-CM

## 2021-05-21 DIAGNOSIS — Z89.512: ICD-10-CM

## 2021-05-21 DIAGNOSIS — G89.29: ICD-10-CM

## 2021-05-21 DIAGNOSIS — R32: ICD-10-CM

## 2021-05-21 DIAGNOSIS — H40.9: ICD-10-CM

## 2021-05-21 PROCEDURE — 99349 HOME/RES VST EST MOD MDM 40: CPT

## 2021-05-21 NOTE — CONSULTATION NOTE
Palliative Care Follow Up





- Referral


Referring Provider: Dr. Jair Cochran


Time of Visit: Initiated 1410


Referral setting: Home


Referral Reason: Vascular Dementia/FTT





- Information Sources


Records reviewed: Previous records reviewed


History/Review of Systems obtained from: Patient, Family (spouse/JULIANN, Goeff)


Exam limitations: Clinical condition (Advanced Dementia)





- History of Present Illness Update


Brief HPI Update: 





This is an 81-year-old -American female who was seen and evaluated within

her home with her /DPOA, Geoff present due to vascular dementia, chronic

pain syndrome, and failure to thrive.





The patient transitioned off of would be home hospice in early April 2021 and 

reestablished with palliative care services in May 2021.





The patient after having 3 teeth extracted and completing her oral antibiotic 

course, she has returned close to her previous baseline, much to her spouse's 

pleasure.  After having her procedure to have 3 teeth extracted she displayed 

increased fatigue and was requiring increased assistance with and meals. 


Per her  Geoff, she has been more engaged recently with conversations 

and is not saying "no" to everything.  He feels that she has been getting 

stronger with noting her ability to assist with transfers to the bedside commode

and not relying solely on her  or caregivers for transfer.





Last week, she did have some reports of coughing which have abated with 

increased cueing by the patient's spouse and caregivers. The spouse reports that

he is cueing the patient to masticate her food further and her appetite has 

greatly improved.  She is now consuming approximately 3 meals per day.





This last week, the patient spouse reports noting some skin breakdown to the 

patient's left buttocks and caregiver has placed a padded dressing over the 

site.  It has not gotten any larger.  The patient does have a history of 

pressure sores in the past that have resolved after being followed by home 

health nursing.





The patient has a history of constipation and with utilization of senna and 

MiraLAX is typically defecating every 2 to 3 days.  There has been no nausea or 

vomiting.





The patient was reporting increased pain to her bilateral lower extremity 

amputation sites with sensitivity to touch and when having her prosthetics in 

place.  She was reinitiated on pregabalin 50 mg at bedtime with improvement over

the last few weeks.  She is not complaining like she was prior to reinitiation 

of pregabalin but does periodically display signs and symptoms of discomfort 

with vocal asked formations as well as facial expressions.  Reporting her 

prosthetics 1 this afternoon to transfer to the bedside commode she did display 

some evidence of discomfort to her bilateral lower extremity amputation sites 

with the left being more sensitive to the right.





The patient is seen sitting up in bed in her.  She is more engaged and answering

in more complete sentences then last evaluation.


Past Medical History: 





Patient has a past medical history of vascular dementia, peripheral vascular 

disease, status post bilateral below the knee amputations, hyperlipidemia, 

coronary artery disease, asthma, GERD, hiatal hernia, chronic vision loss, right

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

myelitis of the right foot in 2006, essential thrombocythemia TA mutation, 

frequent UTIs.





Social History





- Living Situation


Living arrangement: At home


Living Situation: With spouse/s.o.


Support System: 





Patient her and her  have been  for 59 years.  Prior to relocating

to \[facility]\"" she and her  live in [place], [place].  The 

patient grew up in [place], [place].  The patient and her  have 2 

children, 1 daughter and 1 son.  The patient's healthcare power of  is 

her , Geoff with contact number [phone].  The patient and her 

 are estranged from her daughter.  Their son, Zac resides in [place] 

and provide support.





The patient has 3 private caregivers through [facility] that come 6 days/week.  

Her  is Radha Kate.





As the patient's caregivers typically have to leave by 1 PM the patient's spouse

attempts to have appointments and errands first thing in the morning so he will 

be ensured to be present before the caregivers leave.





Medications/Allergies





- Medications


Home Medications: 


                                Ambulatory Orders











 Medication  Instructions  Recorded  Confirmed


 


ALPRAZolam [Alprazolam] 0.25 mg PO BID 05/17/16 04/10/21


 


Esomeprazole Magnesium 40 mg PO BID 10/12/19 05/04/21


 


Mirtazapine 7.5 mg PO QPM 10/12/19 05/04/21


 


Aspirin [Aspirin EC] 81 mg PO DAILY 10/13/19 05/04/21


 


Dorzolamide HCl/Timolol Maleat 1 drops EACHEYE BID 05/12/20 05/04/21





[Dorzolamide-Timolol Eye Drops]   


 


prednisoLONE 1% OPHTH DROPS [Pred 1 drops RIGHTEYE BID 11/10/20 05/04/21





Forte 1% Ophth Drops]   


 


Divalproex Dr [Depakote Dr] 125 mg PO BID 12/26/20 05/04/21


 


Liothyronine [Cytomel] 5 mcg PO DAILY 05/04/21 05/04/21


 


Potassium Chloride 10 meq PO DAILY 05/04/21 05/04/21


 


Senna [Senokot] 8.6 mg PO DAILY PRN 05/04/21 05/04/21


 


polyethylene glycoL 3350 [Miralax] 8.5 gm PO DAILY 05/04/21 05/04/21


 


Pregabalin [Lyrica] 50 mg PO BID 05/10/21 05/10/21














- Allergies


Allergies/Adverse Reactions: 


                                    Allergies











Allergy/AdvReac Type Severity Reaction Status Date / Time


 


gentamicin [Gentamicin] Allergy  Unknown Verified 04/10/21 05:08


 


lisinopril Allergy  Unknown Verified 04/10/21 05:08


 


paroxetine [From Paxil] Allergy  Unknown Verified 04/10/21 05:08














Review of Systems





- Constitutional


Constitutional: reports: Other (left MAC 21cm today, increased from 20.5cm).  

denies: Fever





- Eyes


Eyes: reports: Vision loss (right eye due to glaucoma)





- Ears, Nose & Throat


Ears, Nose & Throat: reports: Dental decay.  denies: Dentures, Bleeding gums, 

Dental pain





- Cardiovascular


Cardiovascular: denies: Chest pain, Edema





- Respiratory


Respiratory: reports: Cough (resolved, see HPI).  denies: Wheezing





- Gastrointestinal


Gastrointestinal: reports: Other (Appetite has improved).  denies: Constipation 

(controlled with present bowel regimen with bowel movement every 2-3 days.), 

Vomiting





- Genitourinary


Genitourinary: reports: Incontinence.  denies: Dysuria





- Musculoskeletal


Musculoskeletal: reports: Muscle weakness, Assistive devices, Transfer issues.  

denies: Joint pain





- Integumentary


Integumentary: reports: Other (skin breakdown left buttock)





- Neurological


Neurological: reports: General weakness, Memory problems





- Psychiatric


Psychiatric: reports: Depression, Behavior disturbances (history of behaviorial 

disturbances)





- Endocrine


Endocrine: reports: Hypothyroidism





- Hematologic/Lymphatic


Hematologic/Lymph: reports: Other (history of frequent UTIs)





- All Other Systems


All Other Systems: reports: Reviewed and negative (Patient is a poor historian 

due to dementia and review of systems supplemented by the patient's 

/Geoff HUMPHREYS.)





Physical Exam





- Vital Signs


Temperature: 36.2 C


Pulse Rate: 61


O2 Saturation: 95 (on RA)


Blood Pressure: 108/63 (left wrist)





- Physical Exam


General Appearance: positive: No acute distress, Alert, Other (resting in bed 

nightgown)


Eyes Bilateral: positive: Other (scarring to right cornea due to glaucoma)


ENT: positive: No signs of dehydration, Other (poor dentition. right upper gums 

with area of recent teeth extraction without erythema or evidence of abcess. 

Denies tenderness to gums.)


Neck: positive: Trachea midline.  negative: Lymphadenopathy (R), Lymphadenopathy

(L)


Cardiovascular: positive: Regular rate & rhythm


Respiratory: positive: No respiratory distress, Breath sounds nml, Rales (BLL, 

trace)


Abdomen: positive: Non-tender, Soft, Nml bowel sounds.  negative: Distended


Skin: positive: Pressure wound (stage II pressure wound to left buttock near 

coccyx 1cm x 0.75cm that appears to be resolving, no discharge or erythema.)


Extremities: positive: No pedal edema, Other (bilateral below the knee 

amputations)


Neurologic/Psychiatric: positive: Disoriented to person, Disoriented to place, 

Disoriented to time, Weakness, Other (More engaged and communicating with 

sentences today than last evaluation)





Palliative Care





- POLST


Patient has POLST: Yes


POLST Status: DNR, Comfort Measures


Pain: Pain improved (to b/l amputation sites)


Anorexia: Mild (1-3)


Sleep: Sleeps well


Constipation: Managed


Performance Status: 





Improvement in her overall functional ability in the last few weeks.  She is 

able to self feed and assist with placing her prosthetics on.  Increase ability 

to stand and pivot to her bedside commode or chair with out relying heavily on 

caregivers or her spouse.  Improvement of overall oral intake.  Intermittent 

episodes of incontinence with urine and remains continent with bowel.





- Palliative Care


Discussion: 





After the patient has recent procedure with dental tooth extraction and oral 

antibiotics it took her some time to rebound and had previously discussed with 

the patient's spouse as well as caregiver, Leah that when underlying dementia 

is present and is unclear which path the patient may go up on with continued, 

gradual decline versus steady improvement but not quite getting back to the 

individuals for her baseline.  The patient has improved and is close to her 

previous baseline much of the relief of the patient's spouse.  She is able to 

participate in more activities of care for herself and is more engaged and 

articulate than she had been in recent weeks.





Overall, she has displayed evidence of weight loss with recent stabilization due

to her improvement over her oral intake. however, due to her inability to 

reposition in bed easily she has developed some skin breakdown to her left 

buttocks with encouragement to the patient and spouse to turn and reposition 

every 2 hours with local skin care.





Impression and Recommendations





- Palliative Care


Impression: 





This is an 81-year-old -American female with underlying vascular 

dementia, peripheral vascular disease, chronic pain syndrome and new area of 

skin breakdown.  She has multiple comorbidities and is at risk for sequela I due

to her peripheral vascular disease and vascular dementia.  The patient graduated

from [facility] in April 2021.  The patient's spouse wishes to focus

on quality of life and comfort within her home setting with the patient's spouse

as her primary caregiver.  Palliative care will continue to provide support for 

symptom management, care coordination, anticipatory guidance and a transition to

hospice services again when appropriate.


Recommendations/Counseling Done: 





1.Pressure ulcer, left buttocks.  Area appears to be stable without signs and 

symptoms of infection.  Encourage the patient to be turned and repositioned in 

bed every 2 hours and to offload of the affected site to allow for healing.  

Recommended use of Cavilon barrier cream at least once daily until the site is 

resolved keeping it open to air.  Advised may reapply with the Cavilon barrier 

cream if removed with pericare.  Spouse advised to contact palliative care if 

any new or worsening symptoms and then would make a referral to home health 

services for further management.





2.  Chronic pain.  Multifactorial in origin with a history of long-term opioid 

therapy that is no longer in use.  History of phantom pain due to bilateral 

below the knee amputation secondary to peripheral vascular disease.  Improvement

of chronic pain and phantom pain with reinitiation of Lyrica 50 mg in the 

evening.  As she continues to have demonstration of discomfort to her bilateral 

below the knee amputation sites will increase Lyrica to 50 mg twice daily and 

monitor her response.





3.  Gingivitis.  Patient is not always compliant with brushing.  Would recommend

use of mouthwash in the evenings to swish and spit for gum health.





4.  Protein calorie malnutrition in the setting of advanced vascular dementia.  

Improvement with the patient's overall oral intake in recent weeks. However, may

continue to wax and wane based on the patient's advancing dementia. MAC to left 

arm 21cm today which was up from last evaluation. 





5. Vascular dementia with a history of peripheral vascular disease.  Chronic.  

Progressive.  Fall precautions.  Recent reports of coughing with potential to 

progress to dysphagia that has resolved.  Discussed continued cueing with this 

patient reviewed with the spouse.  Discussed with the spouse today if dysphagia 

is not unexpected and advancement of dementia and normalized his fears.  

Continue aspirin therapy as prescribed.  Continue Depakote as ordered due to 

previous history of agitation.  She is on no disease modifying agents.  Given 

the patient's advanced age and chronic comorbidities a gradual decline is 

expected.





CPT [id number]





Plan of care reviewed with the patient's spouse and patient at length with 

questions answered and addressed with understanding verbalized.





Disclaimer: The chart note was formulated using voice recognition technology and

unfortunately sound alike errors may occur.

## 2021-06-23 ENCOUNTER — HOSPITAL ENCOUNTER (OUTPATIENT)
Dept: HOSPITAL 76 - PC | Age: 81
Discharge: HOME | End: 2021-06-23
Attending: NURSE PRACTITIONER
Payer: MEDICARE

## 2021-06-23 DIAGNOSIS — Z66: ICD-10-CM

## 2021-06-23 DIAGNOSIS — Z51.5: Primary | ICD-10-CM

## 2021-06-23 DIAGNOSIS — I73.9: ICD-10-CM

## 2021-06-23 DIAGNOSIS — Z89.512: ICD-10-CM

## 2021-06-23 DIAGNOSIS — F01.50: ICD-10-CM

## 2021-06-23 DIAGNOSIS — Z89.511: ICD-10-CM

## 2021-06-23 DIAGNOSIS — G89.4: ICD-10-CM

## 2021-06-23 DIAGNOSIS — E46: ICD-10-CM

## 2021-06-23 PROCEDURE — 99348 HOME/RES VST EST LOW MDM 30: CPT

## 2021-06-23 NOTE — CONSULTATION NOTE
Palliative Care Follow Up





- Referral


Referring Provider: Dr. Jair Cochran


Time of Visit: 8129-6556


Referral setting: Home


Referral Reason: Vascular Dementia/FTT/Pressure ulcer





- Information Sources


Records reviewed: Previous records reviewed


History/Review of Systems obtained from: Patient, Family (spouse/DPOA, Geoff), 

Caregiver (Vanesa)


Exam limitations: Clinical condition (Advanced Dementia)





- History of Present Illness Update


Brief HPI Update: 





This is an 81-year-old -American female who was seen and evaluated within

her home with her /DPOA Geoff present as well as her private caregiver, 

Vanesa due to vascular dementia, chronic pain syndrome and failure to thrive.





The patient transitioned off of home hospice in early April 2021 and 

reestablished with palliative care services in May 2021.





The patient in early spring 2021 had weight loss down to 100.6 pounds of April 2021.  She continues to be consuming her meals almost entirely 3 times a day as 

well as having an afternoon snack.  No reports of cough or evidence of dysphagia

during mealtimes.  They are utilizing a soft diet after the patient had 3 teeth 

extracted in May 2021.





The patient had previously reported been reporting increased pain to her 

bilateral lower extremity amputation sites with sensitivity is touch especially 

when her prosthetics were put in place.  She has transitioned up to pregabalin 

50 mg twice daily and this has greatly improved her comfort level.  She is no 

longer crying out when the sleeves of her prosthetics are put in place.  There 

is a possibility that the coolness of the sleeves may also be contributing 

factor to her discomfort.





She is more at ease with transferring herself from the bed to the bedside 

commode once her prosthetics are put in place.





Patient has a history of constipation and with utilization of senna and MiraLAX 

is typically defecating every other day.  No reports of nausea or vomiting.





At the end of May 2021 she developed some minor skin breakdown to her left 

buttocks and with application of barrier cream and frequent repositioning this 

is completely resolved per the patient's spouse and caregiver.





The patient is seen sitting up in bed.  She is engaged and interactive today 

answering more fluently and in recent visits.


Past Medical History: 





Patient has a past medical history of vascular dementia, peripheral vascular 

disease, status post bilateral below the knee amputations, hyperlipidemia, 

coronary artery disease, asthma, GERD, hiatal hernia, chronic vision loss, right

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 

osteomyelitis of the right foot in 2006, essential thrombocythemia TA mutation,

frequent UTIs.








Social History





- Living Situation


Living arrangement: At home


Living Situation: With spouse/s.o.


Support System: 








Patient her and her  have been  for 59 years.  Prior to relocating

to [facility] she and her  lived in [place], [place].  The pa

tient grew up in [place], [place].  The patient and her  have 2 

children, 1 daughter and 1 son.  The patient's healthcare power of  is 

her , Geoff with contact number [phone].  The patient and her 

 are estranged from her daughter.  Their son, Zac resides in [place] 

and provides support.





The patient has 3 private caregivers through [facility] that come 6 days/week.  

Her  is Radha Kate.














Medications/Allergies





- Medications


Home Medications: 


                                Ambulatory Orders











 Medication  Instructions  Recorded  Confirmed


 


ALPRAZolam [Alprazolam] 0.25 mg PO BID 05/17/16 04/10/21


 


Esomeprazole Magnesium 40 mg PO BID 10/12/19 05/04/21


 


Mirtazapine 7.5 mg PO QPM 10/12/19 05/04/21


 


Aspirin [Aspirin EC] 81 mg PO DAILY 10/13/19 05/04/21


 


Dorzolamide HCl/Timolol Maleat 1 drops EACHEYE BID 05/12/20 05/04/21





[Dorzolamide-Timolol Eye Drops]   


 


prednisoLONE 1% OPHTH DROPS [Pred 1 drops RIGHTEYE BID 11/10/20 05/04/21





Forte 1% Ophth Drops]   


 


Divalproex  [Depakote Dr] 125 mg PO BID 12/26/20 05/04/21


 


Liothyronine [Cytomel] 5 mcg PO DAILY 05/04/21 05/04/21


 


Potassium Chloride 10 meq PO DAILY 05/04/21 05/04/21


 


Senna [Senokot] 8.6 mg PO DAILY PRN 05/04/21 05/04/21


 


polyethylene glycoL 3350 [Miralax] 8.5 gm PO DAILY 05/04/21 05/04/21


 


Pregabalin [Lyrica] 50 mg PO BID 05/10/21 05/10/21














- Allergies


Allergies/Adverse Reactions: 


                                    Allergies











Allergy/AdvReac Type Severity Reaction Status Date / Time


 


gentamicin [Gentamicin] Allergy  Unknown Verified 04/10/21 05:08


 


lisinopril Allergy  Unknown Verified 04/10/21 05:08


 


paroxetine [From Paxil] Allergy  Unknown Verified 04/10/21 05:08














Review of Systems





- Constitutional


Constitutional: reports: Other (left MAC 21cm , previous left MAC 21cm on 

5/21/2021).  denies: Fever





- Eyes


Eyes: reports: Vision loss (right eye due to glaucoma)





- Ears, Nose & Throat


Ears, Nose & Throat: reports: Other (fullness to ears).  denies: Dentures, 

Bleeding gums, Dry mouth





- Cardiovascular


Cardiovascular: denies: Chest pain, Edema





- Respiratory


Respiratory: denies: Cough, Wheezing





- Gastrointestinal


Gastrointestinal: reports: Good appetite.  denies: Constipation (controlled with

present bowel regimen with bowel movement every other day), Vomiting, Other 

(Fecal incontinence)





- Genitourinary


Genitourinary: reports: Incontinence.  denies: Dysuria





- Musculoskeletal


Musculoskeletal: reports: Muscle weakness, Assistive devices, Transfer issues.  

denies: Joint pain





- Integumentary


Integumentary: denies: Rash





- Neurological


Neurological: reports: General weakness, Memory problems





- Psychiatric


Psychiatric: reports: Depression, Behavior disturbances (history of behaviorial 

disturbances)





- Endocrine


Endocrine: reports: Hypothyroidism





- Hematologic/Lymphatic


Hematologic/Lymph: reports: Other (history of frequent UTIs)





- All Other Systems


All Other Systems: reports: Reviewed and negative (Patient is a poor historian 

due to dementia and review of systems supplemented by the patient's 

/Geoff HUMPHREYS and caregiver Vanesa..)





Physical Exam





- Vital Signs


Temperature: 36.5 C


Pulse Rate: 71


O2 Saturation: 95


Blood Pressure: 107/59





- Physical Exam


General Appearance: positive: No acute distress, Alert, Other (resting in bed in

nightgown)


Eyes Bilateral: positive: Other (scarring to right cornea due to glaucoma)


ENT: positive: No signs of dehydration, Other (poor dentition.Denies tenderness 

to gums. Dry, excess yellow cerumen to b/l ear canals removed with lighted curr

ette and otoscope with b/l TMs intact and reduction in sensation of fullness by 

patient report)


Neck: positive: Trachea midline, Lymphadenopathy (L)


Cardiovascular: positive: Regular rate & rhythm


Respiratory: positive: No respiratory distress, Breath sounds nml


Abdomen: positive: Non-tender, Soft, Nml bowel sounds.  negative: Distended


Skin: negative: Pressure wound (hyperpigmentation to sacrum but not evidence of 

skin breakdown)


Extremities: positive: No pedal edema, Other (bilateral below the knee 

amputations)


Neurologic/Psychiatric: positive: Disoriented to place, Disoriented to time, 

Weakness, Other (More engaged and communicating with sentences today)





Palliative Care





- POLST


Patient has POLST: Yes


POLST Status: DNR, Comfort Measures


Pain: Pain improved (discomfort to b/l amputation site improved with increase of

pregabalin to 50mgBID.)





- Palliative Care


Discussion: 





Patient has overall demonstrated signs of stabilization in her functional 

decline that was noted previously.  She is more engaged and her appetite has 

improved.  Her left MAC remains stable at 21 cm.  She also no longer has any 

skin breakdown to her sacrum or bilateral buttocks.





Her spouse is currently undergoing cancer treatment and there is some underlying

concern about her care and management in the future if something were to happen 

to her spouse.  The patient spouse has previously stated that he would wish for 

the patient to remain on [facility] versus going south to [place] where 

their son resides.  If things change with the patient's spouse is health will 

need to tease out plans moving forward but at the present time the patient and 

spouse are being supported by caregiving help within the home.





Impression and Recommendations





- Palliative Care


Impression: 





This is an 81-year-old -American female with underlying vascular 

dementia, peripheral vascular disease, and chronic pain syndrome.  She has 

multiple comorbidities and is at risk for sequela due to her peripheral vascular

disease and vascular dementia.  The patient graduated from [facility] 

h[facility] in April 2021.  Palliative care will continue to provide support for 

symptom management, care coordination, anticipatory guidance and transition to 

hospice services again when appropriate.


Recommendations/Counseling Done: 





1. Chronic pain.  Multifactorial in origin with a history of long-term opioid 

therapy that is no longer in use.  History of phantom pain due to bilateral 

below the knee amputation secondary to peripheral vascular disease.  Has done 

well with increase of pregabalin with control of her discomfort.  Continue 

pregabalin 50 mg twice daily.  Continue to monitor.





2.  Protein calorie malnutrition in the setting of advanced vascular dementia.  

Patient continues to demonstrate improvement of her overall oral intake.  Her 

left MAC remains stable at 21 cm.  Encouraged the patient spouse and caregiver 

to look at ways to provide extra her protein calories in the diet such as milk, 

milkshake, cheese, peanut butter, etc. with understanding verbalized.  Continue 

to monitor.





3.  Pressure ulcer left buttocks.  Resolved.  Patient is at risk for skin 

breakdown due to limited mobility.  Continue to encourage routine position 

changes while in bed every 2 hours.





4.  Vascular dementia with history of peripheral vascular disease.  Chronic.  

Progressive.  Fall precautions.  No evidence of dysphagia reported.  Continue 

aspirin therapy as prescribed.  Continue Depakote as ordered due to previous 

history of agitation.  She is on no disease modifying agents.  Given the 

patient's advanced age and chronic comorbidities a gradual decline is expected.








Total time spent 30 minutes with greater than 50% of this spent in counseling 

and coordination of care with patient, spouse/DPOA and caregiver Vanesa, review 

of pain and symptom management and anticipatory guidance.





Disclaimer: The chart note was formulated using voice recognition technology and

unfortunately sound alike errors may occur.

## 2021-07-21 ENCOUNTER — HOSPITAL ENCOUNTER (OUTPATIENT)
Dept: HOSPITAL 76 - PC | Age: 81
Discharge: HOME | End: 2021-07-21
Attending: NURSE PRACTITIONER
Payer: MEDICARE

## 2021-07-21 DIAGNOSIS — F01.50: ICD-10-CM

## 2021-07-21 DIAGNOSIS — I73.9: ICD-10-CM

## 2021-07-21 DIAGNOSIS — Z51.5: Primary | ICD-10-CM

## 2021-07-21 DIAGNOSIS — R41.9: ICD-10-CM

## 2021-07-21 DIAGNOSIS — Z66: ICD-10-CM

## 2021-07-21 PROCEDURE — 99348 HOME/RES VST EST LOW MDM 30: CPT

## 2021-07-21 NOTE — CONSULTATION NOTE
Palliative Care Follow Up





- Referral


Referring Provider: Dr. Jair Cochran


Time of Visit: Initiated 0915


Referral setting: Home


Referral Reason: Change in condition/Dementia





- Information Sources


Records reviewed: Previous records reviewed


History/Review of Systems obtained from: Patient, Family (spouse, Geoff), 

Caregiver (Vanesa)


Exam limitations: Clinical condition (Advanced Dementia)





- History of Present Illness Update


Brief HPI Update: 





This is an 81-year-old -American female who was seen and evaluated today 

within her home with her /DPOA Geoff as well as her private caregiver, 

Vanesa due to concerns regarding change in condition and underlying vascular 

dementia.





The patient transitioned off of home hospice in early April 2021 and 

reestablished with palliative care services in May 2021.





The patient has been doing very well recently until beginning late Monday 

evening early yesterday morning when she has been in less responsive and more 

sedated.  However, today the patient's spouse reports that she has been more 

alert and verbal.  She consumed all of her toast this morning and drank most of 

her coffee.  She was interested in having pizza last evening and consumed all of

it.  She has not reported any dysuria and there is been no noted hematuria.  The

patient's spouse is very diligent about providing pericare and will provide 

localized pericare with soapy water at least once per day.  She does have a 

history of urinary tract infections however, he will typically hit her "decker

ddenly" and she will not be interested in eating, drinking or engaging.





The patient herself denies any discomfort overall.  When questioned she 

typically is stating "yeah" as a response.





Spouse reports bowel movements are regular with last bowel movement yesterday.  

There has been no nausea or vomiting.  She has remained afebrile.





The patient is seen resting in bed.  She is easily arousable and will respond as

noted above but is not engaged in the conversation. No signs of acute distress.


Past Medical History: 





Patient has a past medical history of vascular dementia, peripheral vascular 

disease, status post bilateral below the knee amputations, hyperlipidemia, 

coronary artery disease, asthma, GERD, hiatal hernia, chronic vision loss, right

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 

osteomyelitis of the right foot in 2006, essential thrombocythemia TA mutation,

frequent UTIs.














Social History





- Living Situation


Living arrangement: At home


Living Situation: With spouse/s.o.


Support System: 








Patient her and her  have been  for 59 years.  Prior to relocating

to [facility] [place] she and her  lived in [place], [place].  The 

patient grew up in [place], [place].  The patient and her  have 2 

children, 1 daughter and 1 son.  The patient's healthcare power of  is 

her , Geoff with contact number [phone].  The patient and her 

 are estranged from her daughter.  Their son, Zac resides in [place] 

and provides support.





The patient has 3 private caregivers through [facility] that come 6 days/week 

(Madeleine, Vanesa and Cate).  Her  is Radha Kate.











Medications/Allergies





- Medications


Home Medications: 


                                Ambulatory Orders











 Medication  Instructions  Recorded  Confirmed


 


ALPRAZolam [Alprazolam] 0.25 mg PO BID 05/17/16 04/10/21


 


Esomeprazole Magnesium 40 mg PO BID 10/12/19 05/04/21


 


Mirtazapine 7.5 mg PO QPM 10/12/19 05/04/21


 


Aspirin [Aspirin EC] 81 mg PO DAILY 10/13/19 05/04/21


 


Dorzolamide HCl/Timolol Maleat 1 drops EACHEYE BID 05/12/20 05/04/21





[Dorzolamide-Timolol Eye Drops]   


 


prednisoLONE 1% OPHTH DROPS [Pred 1 drops RIGHTEYE BID 11/10/20 05/04/21





Forte 1% Ophth Drops]   


 


Divalproex Dr [Depakote Dr] 125 mg PO BID 12/26/20 05/04/21


 


Liothyronine [Cytomel] 5 mcg PO DAILY 05/04/21 05/04/21


 


Potassium Chloride 10 meq PO DAILY 05/04/21 05/04/21


 


Senna [Senokot] 8.6 mg PO DAILY PRN 05/04/21 05/04/21


 


polyethylene glycoL 3350 [Miralax] 8.5 gm PO DAILY 05/04/21 05/04/21


 


Pregabalin [Lyrica] 50 mg PO BID 05/10/21 05/10/21














- Allergies


Allergies/Adverse Reactions: 


                                    Allergies











Allergy/AdvReac Type Severity Reaction Status Date / Time


 


gentamicin [Gentamicin] Allergy  Unknown Verified 04/10/21 05:08


 


lisinopril Allergy  Unknown Verified 04/10/21 05:08


 


paroxetine [From Paxil] Allergy  Unknown Verified 04/10/21 05:08














Review of Systems





- Constitutional


Constitutional: reports: Fatigue, Other (left MAC 21cm on 6/23/2021 , previous 

left MAC 21cm on 5/21/2021).  denies: Fever, Poor appetite





- Eyes


Eyes: reports: Vision loss (right eye due to glaucoma)





- Ears, Nose & Throat


Ears, Nose & Throat: denies: Dentures





- Cardiovascular


Cardiovascular: denies: Edema





- Respiratory


Respiratory: denies: Cough





- Gastrointestinal


Gastrointestinal: reports: Good appetite.  denies: Constipation (controlled with

present bowel regimen with bowel movement every other day), Nausea, Vomiting





- Genitourinary


Genitourinary: reports: Incontinence (intermittent).  denies: Dysuria, Hematuria





- Musculoskeletal


Musculoskeletal: reports: Muscle weakness, Assistive devices, Transfer issues.  

denies: Joint pain





- Integumentary


Integumentary: denies: Rash





- Neurological


Neurological: reports: General weakness, Memory problems





- Psychiatric


Psychiatric: reports: Depression, Behavior disturbances (history of behaviorial 

disturbances)





- Endocrine


Endocrine: reports: Hypothyroidism





- Hematologic/Lymphatic


Hematologic/Lymph: reports: Recurrent infections (UTIs)





- All Other Systems


All Other Systems: reports: Reviewed and negative (Patient is a poor historian 

due to dementia and review of systems supplemented by the patient's h

usband/Geoff HUMPHREYS and caregiver Vanesa..)





Physical Exam





- Vital Signs


Temperature: 36.4 C


Pulse Rate: 73


O2 Saturation: 94 (on RA)


Blood Pressure: 111/63 (left wrist)





- Physical Exam


General Appearance: positive: No acute distress, Alert, Other (resting in bed in

nightgown)


Eyes Bilateral: positive: Other (scarring to right cornea due to glaucoma)


ENT: positive: No signs of dehydration


Neck: positive: Trachea midline


Cardiovascular: positive: Regular rate & rhythm


Respiratory: positive: No respiratory distress, Breath sounds nml.  negative: 

Rales


Abdomen: positive: Non-tender, Soft, Nml bowel sounds, Other (Bladder 

nondistended).  negative: Distended


Skin: negative: Pressure wound


Extremities: positive: No pedal edema, Other (bilateral below the knee 

amputations)


Neurologic/Psychiatric: positive: Disoriented to place, Disoriented to time, 

Weakness, Other (When asked to state her caregivers name today she kept stating 

"Teresa" when this morning she knew it was Vanesa.)





Palliative Care





- POLST


Patient has POLST: Yes


POLST Status: DNR, Comfort Measures


Pain: No pain





- Palliative Care


Discussion: 





Patient has had periods in the past where she will have a subtle decline in her 

overall cognition and alertness with no identifiable cause.  She does have an 

extensive vascular history and unclear if she may have sustained a possible TIA 

however, there is no evidence of any neurological dysfunction with her 

underlying dementia.  The patient also is afebrile and voiding well without 

evidence of a UTI.  Discussed signs and symptoms of a urinary tract infection 

with both the patient's spouse and caregiver in terms of monitoring and would 

prompt obtainment of a urinary specimen.  There is a specimen cup and nuns In 

the home for obtainment of a urine specimen if needed in the future.  However, 

the patient's spouse/DPOA is in agreement to not treat with antibiotic therapy 

if it is not contributing to the patient's comfort and not necessary to avoid 

the risk of an adverse effect. The patient's symptoms have been present for 

approximately 24 hours and she is steadily improving and therefore the patient 

spouse wishes to opt for monitoring.





Impression and Recommendations





- Palliative Care


Impression: 





This is an 81-year-old -American female with underlying vascular 

dementia, peripheral vascular disease and chronic pain syndrome with recent cog

nitive changes that appear to be improving.  The patient is a high risk for 

potential sequelae due to her peripheral vascular disease.  She graduated from 

[facility] in April 2021.  The patient's spouse wishes to focus

on comfort measures.  Palliative care will continue to provide support for 

symptom management, care coordination, anticipatory guidance and a transition to

hospice services again when appropriate.


Recommendations/Counseling Done: 





1. Cognitive changes, subtle.  The patient appears to be improving since recent 

cognitive changes and increase sedation were reported by the patient's spouse 

approximately 24 hours ago.  The patient has an underlying history of peripheral

vascular disease and unclear if potential questionable TIA verse waxing and 

waning of her underlying dementia versus UTI.  However, the patient is not alert

episodes of symptoms of infection.  Reviewed signs and symptoms that would 

warrant a call such as decreased oral intake, fever, reports of Dysuria with 

voiding or any other questions or concerns to palliative care provider.  Would 

recommend encouragement of fluids and patient spouse wishes to monitor.  If ther

e are any continued concerns there is a urine specimen cup within the home to 

collect a urinary specimen if warranted in the future.  Again, the patient has 

had similar instances in the past with a negative work-up.  We will continue to 

provide support to the patient and spouse and continue to monitor.





2.  Vascular dementia with history of peripheral vascular disease.  Chronic.  

Progressive.  Fall precautions.  No evidence of dysphagia reported.  Continue 

aspirin therapy as prescribed.  Continue Depakote as ordered due to previous 

history of agitation.  She is on no disease modifying agents.  Given the 

patient's advanced age and chronic comorbidities a gradual decline is expected.





3.  Advanced care planning.  Patient has POLST in place as DN AR with comfort 

measures.  Former hospice graduate.  Addressed patient spouse's fears today 

regarding if the patient were to require hospice services in the future her 

having been on hospice in the past is not preclude her from being readmitted.  

If the patient demonstrates signs of decline that meets hospice criteria this is

something that the patient spouse would wish to pursue to optimize the patient's

comfort and to have support for not only her but himself as well.





CPT [id number]





Plan of care reviewed with spouse in private caregiver at length with questions 

answered and addressed.  Discussed signs and symptoms that would warrant a phone

call for follow-up.  Patient spouse to follow-up with palliative care tomorrow 

regarding status update.





Disclaimer: The chart note was formulated using voice recognition technology and

unfortunately sound alike errors may occur.

## 2021-08-25 ENCOUNTER — HOSPITAL ENCOUNTER (OUTPATIENT)
Dept: HOSPITAL 76 - PC | Age: 81
Discharge: HOME | End: 2021-08-25
Attending: NURSE PRACTITIONER
Payer: MEDICARE

## 2021-08-25 DIAGNOSIS — K59.00: ICD-10-CM

## 2021-08-25 DIAGNOSIS — I73.9: ICD-10-CM

## 2021-08-25 DIAGNOSIS — E46: ICD-10-CM

## 2021-08-25 DIAGNOSIS — Z66: ICD-10-CM

## 2021-08-25 DIAGNOSIS — R94.6: ICD-10-CM

## 2021-08-25 DIAGNOSIS — Z51.5: Primary | ICD-10-CM

## 2021-08-25 DIAGNOSIS — Z89.512: ICD-10-CM

## 2021-08-25 DIAGNOSIS — F01.50: ICD-10-CM

## 2021-08-25 DIAGNOSIS — G54.6: ICD-10-CM

## 2021-08-25 DIAGNOSIS — Z89.511: ICD-10-CM

## 2021-08-25 PROCEDURE — 99348 HOME/RES VST EST LOW MDM 30: CPT

## 2021-08-25 NOTE — CONSULTATION NOTE
Palliative Care Follow Up





- Referral


Referring Provider: Dr. Jair Cochran


Time of Visit: 1760-9515


Referral setting: Home


Referral Reason: Vascular Dementia/FTT





- Information Sources


Records reviewed: Previous records reviewed


History/Review of Systems obtained from: Patient, Family (spouse/JULIANN Tellez), 

Caregiver (Vanesa)


Exam limitations: Clinical condition (Advanced Dementia)





- History of Present Illness Update


Brief HPI Update: 





This is an 81-year-old -American female who was seen and evaluated today 

within her home with her /DPTAE Tellez present as well as her private 

caregiver, Vanesa due to vascular dementia, chronic pain syndrome and failure to 

thrive.





The patient transitioned off of home hospice in early April 2021 and 

reestablished with palliative care services in May 2021.





The patient in early spring 2021 had weight loss down to 100.6 pounds in April 2021.  She was seen and evaluated by her PCP and was placed on liothyronine 5 

MCG to take half a tablet daily for a week and then increase daily. The patient 

spouse found it difficult to split the small pills and has been administering 1 

tablet every other day for some time.  He does express concerns if the patient 

needs this medication and would like to have reevaluation in the near future.  

The patient herself is doing well with consumption of her meals supported by 

both the caregiver and spouse.  She is not having any episodes of dysphagia or 

cough during mealtimes.  She continues on a soft diet after she had multiple 

teeth extracted in May 2021.





The patient denies any gum discomfort or dental pain.  However, she is not 

brushing her teeth or using mouthwash.  Encouraged the patient and spouse to 

utilize this.





She is having more at ease with transferring herself from the bed to the bedside

commode with her prosthetics in place.  She has had limited to no complaints 

regarding discomfort when her prosthetics are in place due to chronic pain 

syndrome to her bilateral lower extremities where her amputate pin sites are.  

The patient spouse reports that he will need to administer Tylenol 1-2 times per

month for discomfort but otherwise, the patient is doing well on pregabalin 50 

mg twice daily.  Previously, the patient was crying out when the sleeves of her 

prosthetics were put in place.





She does have a history of constipation due to her limited mobility.  Her bowels

are well regulated at the present time with utilization of senna every other day

and MiraLAX if needed.  She is typically having a bowel movement daily.  The 

patient herself denies any discomfort or straining during defecation.





The patient spouse is quite pleased that she has been doing very well overall 

and offers up no acute behavioral concerns or other medical concerns.





The patient is seen sitting up in bed.  She is quite bright, and alert and 

engaged today.


Past Medical History: 





Patient has a past medical history of vascular dementia, peripheral vascular 

disease, status post bilateral below the knee amputations, hyperlipidemia, 

coronary artery disease, asthma, GERD, hiatal hernia, chronic vision loss, right

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 

osteomyelitis of the right foot in 2006, essential thrombocythemia TA mutation,

frequent UTIs.

















Social History





- Living Situation


Living arrangement: At home


Living Situation: With spouse/s.o.


Support System: 








Patient her and her  have been  for 59 years.  Prior to relocating

to [facility] she and her  lived in [place], [place].  The 

patient grew up in [place], [place].  The patient and her  have 2 

children, 1 daughter and 1 son.  The patient's healthcare power of  is 

her , Geoff with contact number [phone].  The patient and her 

 are estranged from her daughter.  Their son, Zac resides in [place] 

and provides support.





The patient has 3 private caregivers through [facility] that come 6 days/week 

(Vanesa Salvador and Cate).  Her  is Radha Kate.





Roxann MADERA visited last week for 2nights from [place].





Medications/Allergies





- Medications


Home Medications: 


                                Ambulatory Orders











 Medication  Instructions  Recorded  Confirmed


 


ALPRAZolam [Alprazolam] 0.25 mg PO BID 05/17/16 08/25/21


 


Esomeprazole Magnesium 40 mg PO BID 10/12/19 08/25/21


 


Mirtazapine 7.5 mg PO QPM 10/12/19 08/25/21


 


Aspirin [Aspirin EC] 81 mg PO DAILY 10/13/19 08/25/21


 


Dorzolamide HCl/Timolol Maleat 1 drops EACHEYE BID 05/12/20 08/25/21





[Dorzolamide-Timolol Eye Drops]   


 


prednisoLONE 1% OPHTH DROPS [Pred 1 drops RIGHTEYE BID 11/10/20 08/25/21





Forte 1% Ophth Drops]   


 


Divalproex Dr [Depakote Dr] 125 mg PO BID 12/26/20 08/25/21


 


Liothyronine [Cytomel] 5 mcg PO Q48H 05/04/21 08/25/21


 


Potassium Chloride 10 meq PO DAILY 05/04/21 08/25/21


 


Senna [Senokot] 8.6 mg PO Q48H 05/04/21 08/25/21


 


polyethylene glycoL 3350 [Miralax] 8.5 gm PO DAILY PRN 05/04/21 08/25/21


 


Pregabalin [Lyrica] 50 mg PO BID 05/10/21 08/25/21














- Allergies


Allergies/Adverse Reactions: 


                                    Allergies











Allergy/AdvReac Type Severity Reaction Status Date / Time


 


gentamicin [Gentamicin] Allergy  Unknown Verified 04/10/21 05:08


 


lisinopril Allergy  Unknown Verified 04/10/21 05:08


 


paroxetine [From Paxil] Allergy  Unknown Verified 04/10/21 05:08














Review of Systems





- Constitutional


Constitutional: reports: Other (left MAC 21cm 8/25/2021; left MAC 21cm on 

6/23/2021 , previous left MAC 21cm on 5/21/2021).  denies: Fatigue, Fever, Poor 

appetite





- Eyes


Eyes: reports: Vision loss (right eye due to glaucoma)





- Ears, Nose & Throat


Ears, Nose & Throat: denies: Bleeding gums, Dental pain





- Cardiovascular


Cardiovascular: denies: Palpitations, Edema





- Respiratory


Respiratory: denies: Cough





- Gastrointestinal


Gastrointestinal: reports: Good appetite (per spouse and caregiver).  denies: 

Constipation (controlled with present bowel regimen , see HPI), Vomiting





- Genitourinary


Genitourinary: reports: Incontinence (intermittent).  denies: Dysuria, Hematuria





- Musculoskeletal


Musculoskeletal: reports: Muscle weakness, Assistive devices, Transfer issues.  

denies: Joint pain





- Integumentary


Integumentary: denies: Rash





- Neurological


Neurological: reports: General weakness, Memory problems





- Psychiatric


Psychiatric: reports: Depression, Behavior disturbances (history of behaviorial 

disturbances)





- Endocrine


Endocrine: reports: Hypothyroidism





- Hematologic/Lymphatic


Hematologic/Lymph: reports: Recurrent infections (UTIs, urine specimen cups in 

the home)





- All Other Systems


All Other Systems: reports: Reviewed and negative (Patient is a poor historian 

due to dementia and review of systems supplemented by the patient's 

/DPGeoff STRONG and caregiver Vanesa..)





Physical Exam





- Vital Signs


Temperature: 36.7 C


Pulse Rate: 63


O2 Saturation: 94 (on RA)


Blood Pressure: 112/68





- Physical Exam


General Appearance: positive: No acute distress, Alert, Other (resting in bed 

with nightgown in place)


Eyes Bilateral: positive: Other (scarring to right cornea due to glaucoma)


ENT: positive: No signs of dehydration, Other (+torus palatinus; gums without 

inflammation or bleeding)


Neck: positive: Trachea midline


Cardiovascular: positive: Regular rate & rhythm, Systolic murmur (+1/6 NATO)


Respiratory: positive: No respiratory distress, Breath sounds nml


Abdomen: positive: Non-tender, Soft, Nml bowel sounds, Other (Bladder 

nondistended).  negative: Distended


Skin: negative: Pressure wound


Extremities: positive: No pedal edema, Other (bilateral below the knee 

amputations)


Neurologic/Psychiatric: positive: Disoriented to place, Disoriented to time, 

Weakness, Other (Alert and engaged today with lighthearted responses--in good 

spirits)





Palliative Care





- POLST


Patient has POLST: Yes


POLST Status: DNR, Comfort Measures


Pain: No pain (controlled with pregabalin for discomfort to b/l amputation 

sites)


Anorexia: None (MAC stable at 21cm to left arm)


Sleep: Sleeps well


Constipation: Managed





- Palliative Care


Discussion: 





The patient is presently stabilized with her overall functional decline that was

noted previously.  She is engaged and her appetite is good per caregiver and 

spouse report.  Her left MAC remains stable at 21 cm.  She does not have any 

skin breakdown.





Given she has required tooth extraction made recommendations for utilization of 

mouthwash nightly as the patient is presently not performing any routine dental 

care.  She is open to performing this as is her spouse.





She is on thyroid replacement therapy presently being administered every other 

day by her spouse.  Her TSH in April 2021 was 2.28 and has not been reevaluated 

since that time and would benefit from reevaluation at next visit and spouse in 

agreement.





Results





- Lab Results


Lab results reviewed: Yes


Lab and Imaging Results: 





4/26/2021





TSH 2.28, free T4 1.04, free T3 2.36





Impression and Recommendations





- Palliative Care


Impression: 





This is an 81-year-old -American female with underlying vascular 

dementia, peripheral vascular disease, low T3 and chronic pain syndrome.  She 

has multiple comorbidities and is at risk for sequela due to her peripheral 

vascular disease and vascular dementia.  The patient graduated from would be 

home hospice in April 2021.  She presently is on when thyroid replacement 

therapy would benefit from reevaluation of thyroid panel at next visit.  

Presently the patient's weight has been stable.  Palliative care will continue 

to provide support for symptom management, care coordination, anticipatory 

guidance with transition to hospice services again when medically appropriate.


Recommendations/Counseling Done: 





1. Low Free T3.  Diagnosed by patient's PCP and initiated on liothyronine 

presently taking 5mcg every other day. TSH 2.28 4/26/2021. Will be evaluated 

with a thyroid panel at next visit And follow with results.





2.Protein calorie malnutrition in the setting of advanced vascular dementia.  

Patient has demonstrated stability in her oral intake.  Her left MAC remains 

stable at 21 cm.  No further evidence of weight loss or decline at the present 

time but given the patient's advanced dementia at risk for decline in the 

future.  Continue to monitor.





3.  Chronic pain.  Multifactorial in origin with a history of long-term opioid 

therapy that is no longer in use.  History of phantom pain due to bilateral 

below the knee amputation secondary to peripheral vascular disease.  Controlled 

with pregabalin 50 mg twice daily.  Requires as needed acetaminophen 

intermittently.  Spouse aware to contact palliative care ARNP if increased 

utilization of acetaminophen takes place for dose adjustment of pregabalin.  

Continue to monitor.





4. Constipation.  Sedentary lifestyle contributing.  Presently controlled.  

Continue senna administration every other day and MiraLAX daily as needed with 

goal bowel movement daily to every other day that is soft.





5.  Vascular dementia with history of peripheral vascular disease.  Chronic.  

Progressive.  Fall precautions.  No evidence of dysphagia reported.  Continue 

aspirin therapy as prescribed.  Continue Depakote as ordered due to previous 

history of agitation.  She is no longer on disease modifying agents.  Given the 

patient's advanced age and chronic comorbidities a gradual Klein is expected.








Total time spent 30 minutes with greater than 50% of this spent in counseling 

and coordination of care with patient, spouse/DPOA and caregiver, Vanesa; review 

of low T3 and hypothyroidism;  review of pain and symptom management and 

anticipatory guidance.





Disclaimer: The chart note was formulated using voice recognition technology and

unfortunately sound alike errors may occur.

## 2021-09-28 ENCOUNTER — HOSPITAL ENCOUNTER (OUTPATIENT)
Dept: HOSPITAL 76 - PC | Age: 81
Discharge: HOME | End: 2021-09-28
Attending: NURSE PRACTITIONER
Payer: MEDICARE

## 2021-09-28 DIAGNOSIS — G89.29: ICD-10-CM

## 2021-09-28 DIAGNOSIS — Z51.5: Primary | ICD-10-CM

## 2021-09-28 DIAGNOSIS — I73.9: ICD-10-CM

## 2021-09-28 DIAGNOSIS — F01.51: ICD-10-CM

## 2021-09-28 DIAGNOSIS — R94.6: ICD-10-CM

## 2021-09-28 DIAGNOSIS — Z66: ICD-10-CM

## 2021-09-28 DIAGNOSIS — G54.6: ICD-10-CM

## 2021-09-28 PROCEDURE — 99348 HOME/RES VST EST LOW MDM 30: CPT

## 2021-09-28 NOTE — CONSULTATION NOTE
Palliative Care Follow Up





- Referral


Referring Provider: Dr. Jair Cochran


Time of Visit: Intiated 1550


Referral setting: Home


Referral Reason: Vascular Dementia/Low Free T3/FTT





- Information Sources


Records reviewed: Previous records reviewed


History/Review of Systems obtained from: Patient, Family (spouse, [place])


Exam limitations: Clinical condition (Advanced Dementia)





- History of Present Illness Update


Brief HPI Update: 





This is an 81-year-old -American female who was seen and evaluated today 

within her home with her /DPTAE Tellez present for follow-up regarding 

Vascular dementia, low free T3, and protein calorie malnutirion. 





The patient transitioned off of home hospice in early April 2021 and 

reestablished with palliative care services in May 2021.





The patient in early spring 2021 had weight loss down to 100.6 pounds in April 2021.  She was seen and evaluated by her PCP and was placed on liothyronine 5 

MCG to take half a tablet daily for a week and then increase daily. The patient 

spouse found it difficult to split the small pills and has been administering 1 

tablet every other day. Will attempt to draw labwork to access continuation of 

medication. Since initiation in APril 2021 the patient's spouse has not seen any

evidence of increased alertness or appetite. In general he has noted no change 

in the patient's mood or demeanor with the medication. 





The spouse is concerned as the patient has been on Alprazolam 0.25mg BID for 

some time and he requested a refill of the medication but it was "denied" per 

the PCP's office and the patient has not heard back from the PCP office and 

expresses concerns regarding this. The patient has been out of her alprazolam 

for 7 days per the spouse report. He reached out for a refill on 9/23/2021. 

Since being off o alprazolam the spouse has not noted tremors, increased 

anxiety, nausea, vomiting, or insomnia. 





She has a history of chronic pain syndrome to her bilateral lower extremities 

where her amputation sites are. Presently her pain is well controlled with 

pregabalin 50mg BID. Intermittently the spouse will provide tylenol two tablets 

of 325mg if needed for pain. He has not noted an increased trend with 

administration. 





Her appetite is stable without complaints per patient or spouse.





The patient is seen sitting up in bed.  She is quite bright, and alert and 

engaged today during evaluation.





Past Medical History: 











Patient has a past medical history of vascular dementia, peripheral vascular 

disease, status post bilateral below the knee amputations, hyperlipidemia, 

coronary artery disease, asthma, GERD, hiatal hernia, chronic vision loss, right

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 

osteomyelitis of the right foot in 2006, essential thrombocythemia TA mutation,

frequent UTIs.

















Social History





- Living Situation


Living arrangement: At home


Living Situation: With spouse/s.o.


Support System: 








Patient her and her  have been  for 59 years.  Prior to relocating

to [facility] she and her  lived in [place], [place].  The 

patient grew up in [place], [place].  The patient and her  have 2 

children, 1 daughter and 1 son.  The patient's healthcare power of  is 

her , Geoff with contact number [phone].  The patient and her 

 are estranged from her daughter.  Their son, Zac resides in [place] 

and provides support.





The patient has 3 private caregivers through [facility] that come 6 days/week 

(Vanesa Salvador and Cate).  Her  is Radha Kate.





Unfortunately one of their caregivers was indisposed for a time for health 

reasons and they were worried about coverage but the [employer] company was able

to find a replacement for coverage and their regular caregiver is to return to 

work tomorrow. 





Medications/Allergies





- Medications


Home Medications: 


                                Ambulatory Orders











 Medication  Instructions  Recorded  Confirmed


 


ALPRAZolam [Alprazolam] 0.25 mg PO QPM 05/17/16 08/25/21


 


Esomeprazole Magnesium 40 mg PO BID 10/12/19 08/25/21


 


Mirtazapine 7.5 mg PO QPM 10/12/19 08/25/21


 


Aspirin [Aspirin EC] 81 mg PO DAILY 10/13/19 08/25/21


 


Dorzolamide HCl/Timolol Maleat 1 drops EACHEYE BID 05/12/20 08/25/21





[Dorzolamide-Timolol Eye Drops]   


 


prednisoLONE 1% OPHTH DROPS [Pred 1 drops RIGHTEYE BID 11/10/20 08/25/21





Forte 1% Ophth Drops]   


 


Divalproex  [Depakote Dr] 125 mg PO BID 12/26/20 08/25/21


 


Liothyronine [Cytomel] 5 mcg PO Q48H 05/04/21 08/25/21


 


Potassium Chloride 10 meq PO DAILY 05/04/21 08/25/21


 


Senna [Senokot] 8.6 mg PO Q48H 05/04/21 08/25/21


 


polyethylene glycoL 3350 [Miralax] 8.5 gm PO DAILY PRN 05/04/21 08/25/21


 


Pregabalin [Lyrica] 50 mg PO BID 05/10/21 08/25/21


 


Acetaminophen [Tylenol] 650 mg PO Q6H PRN 09/28/21 09/28/21














- Allergies


Allergies/Adverse Reactions: 


                                    Allergies











Allergy/AdvReac Type Severity Reaction Status Date / Time


 


gentamicin [Gentamicin] Allergy  Unknown Verified 04/10/21 05:08


 


lisinopril Allergy  Unknown Verified 04/10/21 05:08


 


paroxetine [From Paxil] Allergy  Unknown Verified 04/10/21 05:08














Review of Systems





- Constitutional


Constitutional: reports: Other (left MAC 21cm 8/25/2021; left MAC 21cm on 

6/23/2021 , previous left MAC 21cm on 5/21/2021).  denies: Fever, Poor appetite





- Eyes


Eyes: reports: Vision loss (right eye due to glaucoma)





- Ears, Nose & Throat


Ears, Nose & Throat: denies: Dentures, Dental pain





- Cardiovascular


Cardiovascular: denies: Edema





- Respiratory


Respiratory: denies: Cough





- Gastrointestinal


Gastrointestinal: reports: Good appetite.  denies: Abdominal pain, Constipation 

(controlled with present bowel regimen), Vomiting





- Genitourinary


Genitourinary: reports: Incontinence (intermittent).  denies: Dysuria





- Musculoskeletal


Musculoskeletal: reports: Muscle weakness, Assistive devices, Transfer issues, 

Other (Prostetics for b/l lower extremities).  denies: Joint pain





- Integumentary


Integumentary: denies: Rash





- Neurological


Neurological: reports: General weakness, Memory problems





- Psychiatric


Psychiatric: reports: Depression, Behavior disturbances (history of behaviorial 

disturbances)





- Endocrine


Endocrine: reports: Hypothyroidism





- Hematologic/Lymphatic


Hematologic/Lymph: reports: Recurrent infections (UTIs, urine specimen cups in 

the home)





- All Other Systems


All Other Systems: reports: Reviewed and negative (Patient is a poor historian 

due to dementia and review of systems supplemented by the patient's 

/Geoff HUMPHREYS.)





Physical Exam





- Vital Signs


Temperature: 36.7 C


Pulse Rate: 74


O2 Saturation: 95


Blood Pressure: 128/58 (left wrist)





- Physical Exam


General Appearance: positive: No acute distress, Alert, Other (resting in bed wi

th nightgown in place)


Eyes Bilateral: positive: Other (scarring to right cornea due to glaucoma)


ENT: positive: No signs of dehydration


Neck: positive: Trachea midline


Cardiovascular: positive: Regular rate & rhythm, Systolic murmur (+1/6 NATO)


Respiratory: positive: No respiratory distress, Breath sounds nml.  negative: 

Rales


Abdomen: positive: Non-tender, Soft, Nml bowel sounds


Skin: positive: Other (visible skin C/D/I)


Extremities: positive: No pedal edema, Other (bilateral below the knee 

amputations)


Neurologic/Psychiatric: positive: Disoriented to place, Disoriented to time, 

Weakness, Other (Alert and engaged today, even tolerated x2 attempt of blood 

draw)





Palliative Care





- POLST


Patient has POLST: Yes


POLST Status: DNR, Selective Treatment


Pain: Comment (Controlled with pregabalin with intermittent use of 

acetaminophen)


Feelings of wellbeing/Perceived Quality of Life: Good


Sleep: Sleeps well


Constipation: Managed





- Palliative Care


Discussion: 





The patient has unfortunately been without her alprazolam for 7 days with no 

untoward symptoms or events that have occurred through lack of this medication 

being available.  Discussed with the patient spouse is no evidence of withdrawal

symptoms and the patient's behaviors have been stable recommendation made to 

trial without medication however, patient spouse is hesitant to be completely 

without this medication and in agreement will resume alprazolam 0.25 mg in the 

evening with the goal to eventually taper and discontinue based on the patient's

behavioral response and spouse in agreement and verbalized understanding.  

Reviewed today the importance of not abruptly discontinuing benzodiazepine for 

risk of withdrawal symptoms with understanding verbalized.  Moving forward, 

palliative care will provide prescription for ease of administration and contact

with spouse in agreement.





Impression and Recommendations





- Palliative Care


Impression: 





This is an 81-year-old -American female with underlying vascular 

dementia, peripheral vascular disease chronic pain syndrome, and low T3.  She 

has multiple comorbidities and is at risk for sequelae due to her peripheral 

vascular disease and vascular dementia.  She graduated from home hospice in 

April 2021.  Attempted to draw lab work today to evaluate thyroid panel as she 

is on thyroid replacement therapy however, was unable to obtain blood work will 

reevaluate at next visit.  Will resume alprazolam at lower dose at 0.25 mg in 

the evening with goal to taper and discontinue.  Palliative care will continue 

to provide support for symptom management, care coordination, anticipatory 

guidance with transition to hospice services again when medically appropriate.


Recommendations/Counseling Done: 





1. Low free T3.  Diagnosed by patient's PCP and initiated on liothyronine 

presently taking 5 MCG every other day.  TSH 2.28 on 4/26/2021.  Attempted to 

draw lab work today however, unsuccessful due to patient's venous access.  Will 

reattempt thyroid panel at next visit and follow with results.





2.Vascular dementia with history of peripheral vascular disease with behavioral 

disturbances.  Chronic.  Progressive.  Fall precautions.  No evidence of 

dysphagia reported.  Continue aspirin therapy as prescribed.Continue Depakote as

ordered due to previous history of agitation.  She is no longer on disease 

modifying agents.  Lengthy discussion had with spouse regarding continuation 

versus discontinuation of alprazolam given the patient has been without this 

medication for 7 days and no increased behavioral disturbances noted.  Spouse 

wishes to resume at lower dose of alprazolam at 0.25 mg nightly with the goal to

eventually taper and discontinue.  Moving forward, palliative care will provide 

prescription for alprazolam and Rx for 30 tablets/4 refills sent to [facility] 

pharmacy in [place] at spouse's request.  Advised moving forward to call 7 

days before running out of her prescription as do not wish to abruptly stop 

alprazolam with understanding verbalized by spouse.  Given the patient's 

advanced age and chronic comorbidities a gradual decline is expected.





3.  Chronic pain.  Multifactorial in origin with a history of long-term opioid 

therapy that is no longer in use.  History of phantom pain due to bilateral b

elow the knee amputation secondary to peripheral vascular disease.  Controlled 

with pregabalin 50 mg twice daily.  Will require as needed acetaminophen for 

pain.   Advised not to exceed 3 g of acetaminophen daily from all sources.  

Recommended to the spouse to document date and time of administration of 

acetaminophen for notation moving forward if dose adjustment is needed for 

pregabalin to optimize the patient's comfort.  Continue to monitor.





CPT 92096





Plan of care reviewed with patient spouse with questions answered and addressed.

 Will reattempt lab draw for thyroid panel at next evaluation.  Assisted spouse 

with alprazolam medication filled.





Disclaimer: The chart note was formulated using voice recognition technology and

unfortunately sound alike errors may occur.

## 2021-11-02 ENCOUNTER — HOSPITAL ENCOUNTER (OUTPATIENT)
Dept: HOSPITAL 76 - PC | Age: 81
Discharge: HOME | End: 2021-11-02
Attending: NURSE PRACTITIONER
Payer: MEDICARE

## 2021-11-02 DIAGNOSIS — K21.9: ICD-10-CM

## 2021-11-02 DIAGNOSIS — Z89.512: ICD-10-CM

## 2021-11-02 DIAGNOSIS — Z86.19: ICD-10-CM

## 2021-11-02 DIAGNOSIS — R32: ICD-10-CM

## 2021-11-02 DIAGNOSIS — K44.9: ICD-10-CM

## 2021-11-02 DIAGNOSIS — F32.9: ICD-10-CM

## 2021-11-02 DIAGNOSIS — F41.9: ICD-10-CM

## 2021-11-02 DIAGNOSIS — M81.0: ICD-10-CM

## 2021-11-02 DIAGNOSIS — Z51.5: Primary | ICD-10-CM

## 2021-11-02 DIAGNOSIS — G54.6: ICD-10-CM

## 2021-11-02 DIAGNOSIS — K08.89: ICD-10-CM

## 2021-11-02 DIAGNOSIS — F01.51: ICD-10-CM

## 2021-11-02 DIAGNOSIS — H40.9: ICD-10-CM

## 2021-11-02 DIAGNOSIS — I73.9: ICD-10-CM

## 2021-11-02 DIAGNOSIS — Z89.511: ICD-10-CM

## 2021-11-02 DIAGNOSIS — Z66: ICD-10-CM

## 2021-11-02 DIAGNOSIS — E78.5: ICD-10-CM

## 2021-11-02 DIAGNOSIS — D69.6: ICD-10-CM

## 2021-11-02 DIAGNOSIS — E03.9: ICD-10-CM

## 2021-11-02 DIAGNOSIS — I25.10: ICD-10-CM

## 2021-11-02 PROCEDURE — 99349 HOME/RES VST EST MOD MDM 40: CPT

## 2021-11-02 NOTE — CONSULTATION NOTE
Palliative Care Follow Up





- Referral


Referring Provider: Dr. Jair Cochran


Time of Visit: 6898-1125


Referral setting: Home


Referral Reason: Anxiety/Vascular Dementia/Low T3





- Information Sources


Records reviewed: Previous records reviewed


History/Review of Systems obtained from: Patient, Family (spouse/JULIANN Tellez)


Exam limitations: Clinical condition





- History of Present Illness Update


Brief HPI Update: 





This is an 81-year-old -American female seen in value today within her 

home with her /DPTAE Betancourton present for routine follow-up regarding 

vascular dementia, low free T3, protein calorie malnutrition and anxiety.





Provider wore N95 mask.





The patient transitioned off of home hospice in early April 2021 and 

reestablished with palliative care services in May 2021.





Patient was seen and evaluated by her PCP in April 2021 and was placed on 

Cytomel 5 MCG's and spouse is administering this every other day since April 2021 due to low T3.  Spouse has not noted any increase in alertness or appetite 

since initiation of this medication.  The patient has appeared stable since 

April 2021 with her overall mood and demeanor.





Unfortunately, the patient had run out of her alprazolam 0.25 mg twice daily and

when last seen by this provider in late September 2021 reinitiated alprazolam 

0.25 mg in the evening.  Spouse reports that patient has been stable overnight 

without waking up with evidence of anxiety or disorientation.  Presently, we 

will continue alprazolam in the evening with goal to taper off in the future and

have as needed if evidence of acute anxiety or agitation.





She has a history of chronic pain syndrome due to bilateral lower extremities 

where her amputation sites are.  Pain is presently well controlled with 

pregabalin 50 mg twice daily.  Intermittently the spouse will administer 

acetaminophen if needed for pain.  She also will utilize a heating pack which 

provides comfort.  No noted increased trend in acetaminophen administration.





Patient is doing well with her present appetite.  Spouse has foods on the softer

side due to her history of gingivitis and tooth extraction.





The patient recently was seen by her ophthalmologist x2 times and is next seen 

to follow-up 11/23 due to noted blood on her retina to the left eye.  She had an

ocular injection during her last evaluation.  She denies double vision.  She 

does not have any vision to her right eye.





The patient is seen sitting up in bed.  She is bright and alert and engaged 

today during evaluation.  She continue to track when she was addressed.


Past Medical History: 





Patient has a past medical history of vascular dementia, peripheral vascular 

disease, status post bilateral below the knee amputations, hyperlipidemia, 

coronary artery disease, asthma, GERD, hiatal hernia, chronic vision loss, right

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 

osteomyelitis of the right foot in 2006, essential thrombocythemia TA mutation,

frequent UTIs.

















Social History





- Living Situation


Living arrangement: At home


Living Situation: With spouse/s.o.


Support System: 





Patient her and her  have been  for 59 years.  Prior to relocating

to [facility] she and her  lived in [place], [place].  The 

patient grew up in [place], [place].  The patient and her  have 2 

children, 1 daughter and 1 son.  The patient's healthcare power of  is 

her , Geoff with contact number [phone].  The patient and her 

 are estranged from her daughter.  Their son, Zac resides in [place] 

and provides support.





The patient has 3 private caregivers through [facility] that come 6 days/week 

(Madeleine, Vanesa and Cate).  Her  is Radha Kate.











Medications/Allergies





- Medications


Home Medications: 


                                Ambulatory Orders











 Medication  Instructions  Recorded  Confirmed


 


ALPRAZolam [Alprazolam] 0.25 mg PO QPM 05/17/16 08/25/21


 


Esomeprazole Magnesium 40 mg PO BID 10/12/19 08/25/21


 


Mirtazapine 7.5 mg PO QPM 10/12/19 08/25/21


 


Aspirin [Aspirin EC] 81 mg PO DAILY 10/13/19 08/25/21


 


Dorzolamide HCl/Timolol Maleat 1 drops EACHEYE BID 05/12/20 08/25/21





[Dorzolamide-Timolol Eye Drops]   


 


prednisoLONE 1% OPHTH DROPS [Pred 1 drops RIGHTEYE BID 11/10/20 08/25/21





Forte 1% Ophth Drops]   


 


Divalproex  [Depakoashish Cruz] 125 mg PO BID 12/26/20 08/25/21


 


Liothyronine [Cytomel] 5 mcg PO Q48H 05/04/21 08/25/21


 


Potassium Chloride 10 meq PO DAILY 05/04/21 08/25/21


 


Senna [Senokot] 8.6 mg PO Q48H 05/04/21 08/25/21


 


polyethylene glycoL 3350 [Miralax] 8.5 gm PO DAILY PRN 05/04/21 08/25/21


 


Pregabalin [Lyrica] 50 mg PO BID 05/10/21 08/25/21


 


Acetaminophen [Tylenol] 650 mg PO Q6H PRN 09/28/21 09/28/21














- Allergies


Allergies/Adverse Reactions: 


                                    Allergies











Allergy/AdvReac Type Severity Reaction Status Date / Time


 


gentamicin [Gentamicin] Allergy  Unknown Verified 04/10/21 05:08


 


lisinopril Allergy  Unknown Verified 04/10/21 05:08


 


paroxetine [From Paxil] Allergy  Unknown Verified 04/10/21 05:08














Review of Systems





- Constitutional


Constitutional: reports: Other (left MAC 21cm 8/25/2021; left MAC 21cm on 

6/23/2021 , previous left MAC 21cm on 5/21/2021).  denies: Fever, Poor appetite





- Eyes


Eyes: reports: Vision loss (right eye due to glaucoma)





- Ears, Nose & Throat


Ears, Nose & Throat: denies: Dentures, Mouth lesions, Dental pain





- Cardiovascular


Cardiovascular: denies: Edema





- Respiratory


Respiratory: denies: Cough, Other (No cough during meals)





- Gastrointestinal


Gastrointestinal: reports: Good appetite ( cuts up her food so meats are 

minced and uses soft foods).  denies: Constipation (controlled with present 

bowel regimen), Vomiting





- Genitourinary


Genitourinary: reports: Incontinence (intermittent).  denies: Dysuria, Hematuria





- Musculoskeletal


Musculoskeletal: reports: Muscle weakness, Assistive devices, Transfer issues, 

Other (Prostetics for b/l lower extremities).  denies: Joint pain





- Integumentary


Integumentary: denies: Rash





- Neurological


Neurological: reports: General weakness, Memory problems





- Psychiatric


Psychiatric: reports: Depression, Anxiety, Behavior disturbances (history of 

behaviorial disturbances)





- Endocrine


Endocrine: reports: Hypothyroidism





- Hematologic/Lymphatic


Hematologic/Lymph: reports: Recurrent infections (UTIs, urine specimen cups in 

the home)





- All Other Systems


All Other Systems: reports: Reviewed and negative (Patient is a poor historian 

due to dementia and review of systems supplemented by the patient's 

/Geoff HUMPHREYS.)





Physical Exam





- Vital Signs


Temperature: 36.9 C


Pulse Rate: 77


O2 Saturation: 97 (on RA)


Blood Pressure: 112/62





- Physical Exam


General Appearance: positive: No acute distress, Alert, Other (resting in bed 

with nightgown in place)


Eyes Bilateral: positive: Other (scarring to right cornea due to glaucoma)


ENT: positive: Pharynx nml, No signs of dehydration.  negative: Oral lesions


Neck: positive: Trachea midline


Cardiovascular: positive: Regular rate & rhythm, Systolic murmur (+2/6 NATO)


Respiratory: positive: No respiratory distress, Breath sounds nml


Abdomen: positive: Non-tender, Soft, Nml bowel sounds.  negative: Distended


Skin: negative: Pressure wound (sacrum)


Extremities: positive: No pedal edema, Other (bilateral below the knee 

amputations)


Neurologic/Psychiatric: positive: Disoriented to place, Disoriented to time, 

Weakness, Other (Alert and engaged today, tolerated x2 attempt of blood draw)





Palliative Care





- POLST


Patient has POLST: Yes


POLST Status: DNR, Selective Treatment


Pain: No pain (controlled with lyrica and PRN acetaminophen)


Constipation: Managed





- Palliative Care


Discussion: 





Patient has tolerated initiate reinitiation of alprazolam 0.25 mg in the 

evening.  There has been no outbursts or evidence of agitation or anxiety.  Goal

will be to eventually taper and discontinue based on the patient's behavioral 

response and spouse in agreement.  Presently will keep on board and reassess in 

the near future for slow taper.





Patient has been on Cytomel since April 2021 with out repeat evaluation of her 

low T3.  Attempted to blood draws today but given the patient's vasculature was 

unable to obtain.  This is been the second attempt and discussed with patient 

spouse would continue Cytomel 5 mcg every other day presently and patient is 

scheduled to be seen by her PCP in December 2021 and there is an in-house 

laboratory and will request lab work obtained at that time and reevaluation for 

continuation of medication and request for yearly visits given the taxing effort

it takes for the patient to leave her home environment.





Impression and Recommendations





- Palliative Care


Impression: 





This is an 81-year-old -American female with underlying vascular 

dementia, peripheral vascular disease, chronic pain syndrome and low T3.  She 

has multiple comorbidities and at risk for sequelae due to her peripheral 

vascular disease and vascular dementia.  She graduated from home hospice in 

April 2021.  Attempted to draw lab work again today to evaluate thyroid panel as

she is on thyroid replacement therapy due to low T3 however, unable to obtain 

blood work and requesting that PCP evaluate during evaluation in December 2021. 

She has tolerated resumption of alprazolam at 0.25 mg in the evening with goal 

to slowly taper and discontinue in the near future.  Palliative care will 

continue provide support for symptom management, care coordination, anticipatory

guidance with transition to hospice services again when medically appropriate.


Recommendations/Counseling Done: 





1.  Low T3.  Diagnosed with patient's PCP and initiated on Cytomel 5 mcg spouse 

administering every other day.  TSH 2.28 on 4/26/2021.  Attempted lab drawl 

today however unsuccessful due to patient's venous access.  This has been the 

second attempt.  Will request that patient's PCP evaluate during office visit in

December 2021 and address continuation based on laboratory work and spouse in 

agreement.





2.  Vascular dementia with history of peripheral vascular disease with 

behavioral disturbances.  Chronic.  Progressive.  Fall precautions.  No evidence

of dysphagia reported.  Continue Depakote as ordered due to previous history of 

agitation.  She is no longer on disease modifying agents.  Presently on 

alprazolam 0.25 mg nightly tolerating well with no evidence of increased 

behavioral disturbances or anxiety with goal to eventually slowly taper and 

discontinue.  Given the patient's advanced age and chronic comorbidities a 

gradual decline is expected.





3.  Chronic pain syndrome.  Multifactorial in origin with a history of long-term

opioid therapy that is no longer in use.  History of phantom pain due to 

bilateral below the knee amputation secondary to peripheral vascular disease.  

Controlled with pregabalin 50 mg twice daily.  Requires as needed acetaminophen 

for pain.  Spouse is aware to make palliative care aware if increased 

acetaminophen use to reevaluate dose adjustment of pregabalin.  Continue to 

monitor.





4.  Advanced care planning.  Patient has POLST in place as DN R.  Goal is to 

optimize the patient's comfort and transition to hospice services again when 

medically appropriate.





Total time spent 40 minutes with greater than 50% of the spent in counseling and

coordination of care with the patient and spouse/DPOA; attempt of lab draw; 

evaluation of transfers to the bedside commode; examination the patient; review 

of medication management; and anticipatory guidance.





Disclaimer: The chart note was formulated using voice recognition technology and

unfortunately sound alike errors may occur.

## 2021-12-16 ENCOUNTER — HOSPITAL ENCOUNTER (OUTPATIENT)
Dept: HOSPITAL 76 - EMS | Age: 81
End: 2021-12-16
Payer: MEDICARE

## 2021-12-16 DIAGNOSIS — Z03.89: Primary | ICD-10-CM

## 2021-12-17 ENCOUNTER — HOSPITAL ENCOUNTER (OUTPATIENT)
Dept: HOSPITAL 76 - PC | Age: 81
Discharge: HOME | End: 2021-12-17
Attending: NURSE PRACTITIONER
Payer: MEDICARE

## 2021-12-17 DIAGNOSIS — Z51.5: Primary | ICD-10-CM

## 2021-12-17 DIAGNOSIS — I73.9: ICD-10-CM

## 2021-12-17 DIAGNOSIS — E46: ICD-10-CM

## 2021-12-17 DIAGNOSIS — Z66: ICD-10-CM

## 2021-12-17 DIAGNOSIS — G89.4: ICD-10-CM

## 2021-12-17 DIAGNOSIS — Z89.511: ICD-10-CM

## 2021-12-17 DIAGNOSIS — F01.51: ICD-10-CM

## 2021-12-17 DIAGNOSIS — R94.6: ICD-10-CM

## 2021-12-17 DIAGNOSIS — Z89.512: ICD-10-CM

## 2021-12-17 PROCEDURE — 99349 HOME/RES VST EST MOD MDM 40: CPT

## 2021-12-17 NOTE — CONSULTATION NOTE
Palliative Care Follow Up





- Referral


Referring Provider: Dr. Jair Cochran


Time of Visit: 9954-5391


Referral setting: Home


Referral Reason: FTT/Change in condition/Vascular Dementia/Low T3





- Information Sources


Records reviewed: Previous records reviewed


History/Review of Systems obtained from: Patient, Family (spouse/JULIANN Tellez)


Exam limitations: Clinical condition (Advanced Dementia)





- History of Present Illness Update


Brief HPI Update: 





This is an 81-year-old -American female seen in evaluation today within 

her home with her /DPTAE Tellez present due to change in condition, 

protein calorie malnutrition and failure to thrive, low free T3 and vascular 

dementia.





Provider wore N95 mask.





The patient transitioned off of home hospice in early April 2021 and was 

reestablished with palliative care services in May 2021.





Patient received her Covid19 booster over 1 week ago.  Since that time, she has

been sleeping more during the day, has had a decreased oral intake, and is no 

longer able to assist with transfers to her bedside commode.  She was unable to 

support her weight with her  transferring her on 12/16 hand the patient 

had to be assisted to the floor by her spouse.  This resulted in EMS services 

being called vital signs being taken and was noted to have a blood glucose level

of 74.  This was in the setting of the patient having a decreased oral intake 

over the last several days and both food as well as liquid.





In this weakened state, it is increasingly difficult for the patient spouse to 

provide the bulk of caregiving services when the  ES caregivers are not 

within the home which is in the afternoon and evening hours and not at all on 

Sundays.  He has his own health problems and can not afford to have an injury or

be incapacitated to not be able to provide care to the patient.





The patient herself denies any dysuria, there has been no cough reported.  She 

did have an episode of fecal incontinence yesterday however, this is not the 

norm for the patient.





Overall, the patient's spouse sees generalized decline and lack of strength and 

is hoping to transition back to hospice services.





The patient is scheduled to see her PCP next week and she request to have lab 

work obtained as this was unable to be obtained on multiple occasions within the

home with the patient being a difficult blood draw.  She was placed on Cytomel 5

MCG's and the patient has been administered this every other day since April 2021 for low T3.  The patient spouse has not noted any increase in her alertness

or appetite since initiation of this medication.  Given her overall presentation

today we will discontinue Cytomel as it is not added to the patient's comfort as

well as advised to cancel upcoming PCP appointment.





Patient is seen resting in bed dozing.  Awakens to her name.  She is not 

attentive for long periods of time and then will drift back off to sleep.  She 

is not engaged that she has been on previous evaluations.


Past Medical History: 





Patient has a past medical history of vascular dementia, peripheral vascular 

disease, status post bilateral below the knee amputations, hyperlipidemia, 

coronary artery disease, asthma, GERD, hiatal hernia, chronic vision loss, right

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 

osteomyelitis of the right foot in 2006, essential thrombocythemia TA mutation,

frequent UTIs.











Social History





- Living Situation


Living arrangement: At home


Living Situation: With spouse/s.o.


Support System: 








Patient her and her  have been  for 59 years.  Prior to relocating

to [facility] she and her  lived in [place], [place].  The 

patient grew up in [place], [place].  The patient and her  have 2 

children, 1 daughter and 1 son.  The patient's healthcare power of  is 

her , Geoff with contact number [phone].  The patient and her 

 are estranged from her daughter.  Their son, Zac resides in [place] 

and provides support.





The patient has 3 private caregivers through [facility] that come 6 days/week 

(Madeleine, Vanesa and Cate).  Her  is Radha Kate [phone]. 

Given the recent decline in the patient's functional status will reach out to 

the patient's COPD ask  to request for reevaluation in her  ES 

hours and obtainment of additional caregiving support more specifically in the 

evenings.





Medications/Allergies





- Medications


Home Medications: 


                                Ambulatory Orders











 Medication  Instructions  Recorded  Confirmed


 


ALPRAZolam [Alprazolam] 0.25 mg PO QPM 05/17/16 08/25/21


 


Esomeprazole Magnesium 40 mg PO BID 10/12/19 08/25/21


 


Mirtazapine 7.5 mg PO QPM 10/12/19 08/25/21


 


Aspirin [Aspirin EC] 81 mg PO DAILY 10/13/19 08/25/21


 


Dorzolamide HCl/Timolol Maleat 1 drops EACHEYE BID 05/12/20 08/25/21





[Dorzolamide-Timolol Eye Drops]   


 


prednisoLONE 1% OPHTH DROPS [Pred 1 drops RIGHTEYE BID 11/10/20 08/25/21





Forte 1% Ophth Drops]   


 


Divalproex Dr [Depakote Dr] 125 mg PO BID 12/26/20 08/25/21


 


Potassium Chloride 10 meq PO DAILY 05/04/21 08/25/21


 


Senna [Senokot] 8.6 mg PO Q48H 05/04/21 08/25/21


 


polyethylene glycoL 3350 [Miralax] 8.5 gm PO DAILY PRN 05/04/21 08/25/21


 


Pregabalin [Lyrica] 50 mg PO BID 05/10/21 08/25/21


 


Acetaminophen [Tylenol] 650 mg PO Q6H PRN 09/28/21 09/28/21














- Allergies


Allergies/Adverse Reactions: 


                                    Allergies











Allergy/AdvReac Type Severity Reaction Status Date / Time


 


gentamicin [Gentamicin] Allergy  Unknown Verified 04/10/21 05:08


 


lisinopril Allergy  Unknown Verified 04/10/21 05:08


 


paroxetine [From Paxil] Allergy  Unknown Verified 04/10/21 05:08














Review of Systems





- Constitutional


Constitutional: reports: Fatigue, Weakness, Poor appetite, Other (left MAC 19cm 

12/18/2021; left MAC 21cm 8/25/2021; left MAC 21cm on 6/23/2021 , previous left 

MAC 21cm on 5/21/2021).  denies: Fever





- Eyes


Eyes: reports: Vision loss (right eye due to glaucoma)





- Ears, Nose & Throat


Ears, Nose & Throat: denies: Dentures, Dental pain





- Cardiovascular


Cardiovascular: denies: Edema





- Respiratory


Respiratory: denies: Cough, Other (No cough during meals)





- Gastrointestinal


Gastrointestinal: reports: Diarrhea (yesterday), Poor appetite (see HPI).  

denies: Abdominal pain, Constipation, Nausea, Vomiting





- Genitourinary


Genitourinary: reports: Incontinence (intermittent).  denies: Dysuria, Hematuria





- Musculoskeletal


Musculoskeletal: reports: Muscle weakness, Assistive devices, Transfer issues, 

Other (Prostetics for b/l lower extremities).  denies: Joint pain





- Integumentary


Integumentary: reports: Other (Redness to coccyx covered with bandaid with no 

open area)





- Neurological


Neurological: reports: General weakness, Memory problems





- Psychiatric


Psychiatric: reports: Depression, Anxiety, Behavior disturbances (history of 

behaviorial disturbances)





- Endocrine


Endocrine: reports: Other (Low T3)





- Hematologic/Lymphatic


Hematologic/Lymph: reports: Recurrent infections (UTIs, urine specimen cups in 

the home)





- All Other Systems


All Other Systems: reports: Reviewed and negative (Patient is a poor historian 

due to dementia and review of systems supplemented by the patient's 

/DPGeoff STRONG.)





Physical Exam





- Vital Signs


Temperature: 36.7 C


Pulse Rate: 74


O2 Saturation: 95 (on RA)


Blood Pressure: 102/64 (left wrist)





- Physical Exam


General Appearance: positive: No acute distress, Other (resting in bed with 

nightgown in place, +drowsy)


Eyes Bilateral: positive: Other (+scarring to right cornea due to glaucoma)


ENT: positive: No signs of dehydration.  negative: Oral lesions


Neck: positive: Trachea midline


Cardiovascular: positive: Regular rate & rhythm, Systolic murmur (+2/6 NATO)


Respiratory: positive: No respiratory distress, Breath sounds nml, Diminished in

bases


Abdomen: positive: Non-tender, Soft, Nml bowel sounds.  negative: Guarding, 

Distended


Skin: positive: Other (Redness to cocyxx per spouse with skin intact--bandiad in

place)


Extremities: positive: No pedal edema, Other (bilateral below the knee 

amputations)


Neurologic/Psychiatric: positive: Disoriented to place, Disoriented to time, 

Weakness, Other (Not engaged as she has been on recent evaluations)





Palliative Care





- POLST


Patient has POLST: Yes


POLST Status: DNR, Comfort Measures


Pain: No pain (controlled with lyrica)


Tiredness/Fatigue: Moderate (4-6)


Drowsiness/Sedation: Moderate (4-6)


Nausea: None


Anorexia: Moderate (4-6)


Dyspnea: None


Sleep: Sleeps well


Performance Status: 





Prior to this recent change in mental status and alertness in the last week the 

patient was able to self feed and assist with transfers to her bedside commode. 

Now, she is being assisted with meals and is consuming minimal during these 

times.  More sedated.  Intermittent episodes of incontinence.  Recent episodes 

of bowel incontinence.





- Palliative Care


Discussion: 





Patient has had acute changes in her mental status and alertness status post 

Covid19 vaccine.  The patient is very sensitive to alterations in her 

environment as well as medications.  She has had episodes of altered mental 

status and alertness in the past with possible UTI or encephalopathy 

contributing however, the patient has not demonstrated any signs or symptoms of 

acute infection prior to this recent change and mental status.  She has not 

bounced back to her previous baseline.  The patient's spouse wishes to focus on 

quality of life and comfort measures within the home environment and transition 

back to hospice services.  He recognizes if the patient were to be transferred 

to the hospital setting this would result in increased confusion and discomfort 

at home as he would be unable to be present with her and expresses concerns 

regarding his own health if the patient were to be hospitalized due to his 

increased risk of infection.





The patient has had a decrease in her oral intake and has gone from an MAC of 21

cm to 19 cm when measured today.  She is now requiring assistance with meals 

when previously she was able to self feed.  Discussed with the patient's spouse 

that she is demonstrating signs and symptoms of continued advancement of her 

dementia and vascular dementia does not follow a typical course as Alzheimer's 

disease with understanding verbalized.  The patient's spouse is fearful of being

alone without caregiving support in the evenings and on Sundays given her 

weakened state.  Discussed measures to toilet in bed to reduce risk of fall or 

injury to the spouse.  Spouse is aware that he can contact for a lift assist at 

any point in time.





Given the patient being placed on Cytomel in April 2021 for low T3 has not added

to her comfort will discontinue and spouse in agreement.





Impression and Recommendations





- Palliative Care


Impression: 





This is an 81-year-old -American female who has experienced acute 

functional and cognitive decline, protein calorie malnutrition due to decreased 

oral intake in the setting of advanced vascular dementia and peripheral vascular

disease.  She has a history of low T3 and given his lack of contribution to 

comfort will discontinue Cytomel.  She has not had improvement since onset of 

symptoms and therefore, will transition to hospice services.  Palliative care 

provided supportive and empathetic listening today, examination of patient, care

coordination and will hold in place until hospice services admits patient.


Recommendations/Counseling Done: 





1. Protein calorie malnutrition with decreased oral intake in the setting of 

advanced vascular dementia.  Patient continues with minimal oral intake of both 

liquids and food.  Encourage the patient spouse to offer once, offer trace and 

after the third time she continues to refuse them like the offering breast and 

readdress at a later time.  Requiring assistance during mealtimes from 

caregivers and spouse.  Sit up at least 30 degrees to decrease risk of 

aspiration.  Patient spouse does not wish for PEG tube placement for nutritional

assistance and wishes to focus on pleasure foods that are desirable to the 

patient and a transition to hospice services for an added layer of support with 

a focus on comfort.  Decrease in MAs see on the left arm from 21 cm to 19 cm 

obtained today.





2.  Low T3.  Diagnosed by patient's PCP and initiated on Cytomel 5 mcg with a 

spouse administering every other day.  TSH 2.28 on 4/26/2021.  Multiple attempts

of lab draws in recent months that have been unsuccessful due to the patient's 

venous access.  Since initiation of Cytomel patient spouse has not noted any 

change in the patient's oral intake or demeanor and as it is not contributing to

comfort will discontinue Cytomel.  Recommendations made to discontinue pending 

office visit to PCP next week given patient's clinical status and spouse to 

discontinue.





3.  Chronic pain syndrome.  Multifactorial in origin with a history of long-term

opioid therapy that is no longer in use.  History of phantom pain due to 

bilateral below the knee amputation secondary to peripheral vascular disease.  

Controlled with pregabalin 50 mg twice daily.  Requires as needed acetaminophen 

for pain.  Continue to monitor.





4.  Vascular dementia with history of peripheral vascular disease with 

behavioral disturbances.  Chronic.  Progressive.  Fall precautions.  No evidence

of dysphagia reported.  Continue Depakote as ordered due to previous history of 

agitation.  She is no longer on disease modifying agents.  Presently on 

alprazolam 0.25 mg nightly tolerating well no evidence of increased behavioral 

disturbances or anxiety with the goal to slowly taper off and discontinue.  

Patient with increased weakness and debility and will request hospital bed for 

ease of care for patient and spouse.  Given the patient's advanced age and 

chronic comorbidities a gradual decline is expected.





5.  Caregiver burden.  Patient spouse has support through [place] [facility] hours and 

caregivers.  Would benefit from reevaluation for additional caregiving support 

within the home.  We will reach out to patient's  at [facility] on 

Monday to determine if patient is eligible for additional hours and caregiving 

support within the home.  Patient to transition to hospice services however, at 

admission will not happen prior to [place] due to staffing.  Spouse is aware. 

Will request hospital bed and bedside table for additional support for patient 

and spouse.  Supportive and empathetic listening provided to the patient spouse.





6.  Patient has POLST in place as DN AR.  Goal is to optimize the patient's 

comfort and to make a transition to hospice services given further decline in 

support to the patient and spouse within the home setting.





Total time spent 55 minutes with greater than 50% of the spent in counseling and

coordination of care with the patient and spouse/DPOA; supportive and empathetic

listening; care coordination; examination of patient; review of goals of care; 

and anticipatory guidance.





Contacted hospice RN coordinatorTerri regarding request for hospice services.





Disclaimer: The chart note was formulated using voice recognition technology and

unfortunately sound alike errors may occur.

## 2022-06-28 ENCOUNTER — HOSPITAL ENCOUNTER (OUTPATIENT)
Dept: HOSPITAL 76 - EMS | Age: 82
Discharge: HOSPICE | End: 2022-06-28
Attending: HOSPITALIST
Payer: MEDICARE

## 2022-06-28 DIAGNOSIS — I73.9: ICD-10-CM

## 2022-06-28 DIAGNOSIS — G31.1: Primary | ICD-10-CM

## 2022-06-28 DIAGNOSIS — Z89.611: ICD-10-CM

## 2022-06-28 DIAGNOSIS — Z89.612: ICD-10-CM
